# Patient Record
Sex: MALE | Race: WHITE | Employment: FULL TIME | URBAN - METROPOLITAN AREA
[De-identification: names, ages, dates, MRNs, and addresses within clinical notes are randomized per-mention and may not be internally consistent; named-entity substitution may affect disease eponyms.]

---

## 2017-01-23 RX ORDER — ZOLPIDEM TARTRATE 12.5 MG/1
12.5 TABLET, FILM COATED, EXTENDED RELEASE ORAL
Qty: 30 TAB | Refills: 5 | OUTPATIENT
Start: 2017-01-23 | End: 2017-09-01 | Stop reason: SDUPTHER

## 2017-01-23 NOTE — TELEPHONE ENCOUNTER
Rx called into pharmacy on file. Requested Prescriptions     Signed Prescriptions Disp Refills    zolpidem CR (AMBIEN CR) 12.5 mg tablet 30 Tab 5     Sig: Take 1 Tab by mouth nightly as needed for Sleep. Max Daily Amount: 12.5 mg.      Authorizing Provider: Dinorah Amato

## 2017-01-23 NOTE — TELEPHONE ENCOUNTER
Requested Prescriptions     Pending Prescriptions Disp Refills    zolpidem CR (AMBIEN CR) 12.5 mg tablet 30 Tab 5     Sig: Take 1 Tab by mouth nightly as needed for Sleep. Max Daily Amount: 12.5 mg.      Last visit 8/17/2016  Next visit 02/17/2017

## 2017-02-17 ENCOUNTER — OFFICE VISIT (OUTPATIENT)
Dept: INTERNAL MEDICINE CLINIC | Age: 57
End: 2017-02-17

## 2017-02-17 VITALS
DIASTOLIC BLOOD PRESSURE: 78 MMHG | BODY MASS INDEX: 32.53 KG/M2 | OXYGEN SATURATION: 98 % | TEMPERATURE: 97.7 F | HEART RATE: 70 BPM | WEIGHT: 219.6 LBS | RESPIRATION RATE: 20 BRPM | HEIGHT: 69 IN | SYSTOLIC BLOOD PRESSURE: 138 MMHG

## 2017-02-17 DIAGNOSIS — E78.2 MIXED HYPERLIPIDEMIA: Primary | ICD-10-CM

## 2017-02-17 DIAGNOSIS — I25.10 CORONARY ARTERY DISEASE DUE TO CALCIFIED CORONARY LESION: ICD-10-CM

## 2017-02-17 DIAGNOSIS — I25.84 CORONARY ARTERY DISEASE DUE TO CALCIFIED CORONARY LESION: ICD-10-CM

## 2017-02-17 DIAGNOSIS — Z79.899 CONTROLLED SUBSTANCE AGREEMENT SIGNED: ICD-10-CM

## 2017-02-17 DIAGNOSIS — I10 ESSENTIAL HYPERTENSION, BENIGN: ICD-10-CM

## 2017-02-17 DIAGNOSIS — R73.03 PREDIABETES: ICD-10-CM

## 2017-02-17 NOTE — MR AVS SNAPSHOT
Visit Information Date & Time Provider Department Dept. Phone Encounter #  
 2/17/2017 10:40 AM RONA Munoz [facility] [phone] [id number] Follow-up Instructions Return in about 4 months (around 6/17/2017) for re-establish care with Dr. Eda Menendez. Upcoming Health Maintenance Date Due INFLUENZA AGE 9 TO ADULT 7/1/2017* COLONOSCOPY 11/10/2019 DTaP/Tdap/Td series (2 - Td) 6/8/2025 *Topic was postponed. The date shown is not the original due date. Allergies as of 2/17/2017  Review Complete On: 2/17/2017 By: Faby Hughes LPN Severity Noted Reaction Type Reactions Bee Sting [Sting, Bee] High 01/15/2010    Anaphylaxis Amlodipine  01/31/2011    Other (comments) Increased GERD Demerol [Meperidine]  01/15/2010   Intolerance Unknown (comments) Doubles over in pain Lisinopril  01/31/2011    Other (comments) Fatigue Current Immunizations  Reviewed on 6/8/2015 Name Date Influenza Vaccine 9/1/2005 TD Vaccine 1/15/2006 Tdap 6/8/2015 Not reviewed this visit You Were Diagnosed With   
  
 Codes Comments Mixed hyperlipidemia    -  Primary ICD-10-CM: O02.2 ICD-9-CM: 272.2 Essential hypertension, benign     ICD-10-CM: I10 
ICD-9-CM: 401.1 Prediabetes     ICD-10-CM: R73.03 
ICD-9-CM: 790.29 Coronary artery disease due to calcified coronary lesion     ICD-10-CM: I25.10, I25.84 ICD-9-CM: 414.00, 414.4 BMI 32.0-32.9,adult     ICD-10-CM: G50.12 
ICD-9-CM: V85.32 Controlled substance agreement signed     ICD-10-CM: Z79.899 ICD-9-CM: V58.69 Vitals BP Pulse Temp Resp Height(growth percentile) Weight(growth percentile) 138/78 70 97.7 °F (36.5 °C) (Oral) 20 5' 9\" (1.753 m) 219 lb 9.6 oz (99.6 kg) SpO2 BMI Smoking Status 98% 32.43 kg/m2 Current Some Day Smoker Vitals History BMI and BSA Data Body Mass Index Body Surface Area 32.43 kg/m 2 2.2 m 2 Preferred Pharmacy Pharmacy Name Phone [id number] [phone] Your Updated Medication List  
  
   
This list is accurate as of: 2/17/17 11:27 AM.  Always use your most recent med list.  
  
  
  
  
 aspirin 81 mg chewable tablet Take 1 tablet by mouth daily. atorvastatin 20 mg tablet Commonly known as:  LIPITOR Take 1 Tab by mouth daily. EPINEPHrine 0.3 mg/0.3 mL injection Commonly known as:  EPIPEN  
0.3 mL by IntraMUSCular route once as needed. fexofenadine 180 mg tablet Commonly known as:  Wandalee Wolf Take 1 Tab by mouth daily. fluticasone 50 mcg/actuation nasal spray Commonly known as:  Saw Cyphers 2 Sprays by Both Nostrils route daily. hydroCHLOROthiazide 25 mg tablet Commonly known as:  HYDRODIURIL  
TAKE 1 TABLET BY MOUTH DAILY. NIACIN FLUSH FREE 400 mg niacin (500 mg) Cap Generic drug:  niacin-inositol Take 1 Cap by mouth daily. Omega-3-DHA-EPA-Fish Oil 1,000 mg (120 mg-180 mg) Cap Take 1 Tab by mouth daily. omeprazole 40 mg capsule Commonly known as:  PRILOSEC  
TAKE 1 CAPSULE BY MOUTH EVERY DAY  
  
 selenium 200 mcg Tab Take 400 mcg by mouth daily. VITAMIN B-6 100 mg tablet Generic drug:  pyridoxine (vitamin B6) Take 100 mg by mouth every other day. VITAMIN C 500 mg tablet Generic drug:  ascorbic acid (vitamin C) Take  by mouth.  
  
 zolpidem CR 12.5 mg tablet Commonly known as:  AMBIEN CR Take 1 Tab by mouth nightly as needed for Sleep. Max Daily Amount: 12.5 mg. We Performed the Following HEMOGLOBIN A1C WITH EAG [39971 CPT(R)] LIPID PANEL [16233 CPT(R)] METABOLIC PANEL, COMPREHENSIVE [64969 CPT(R)] Follow-up Instructions Return in about 4 months (around 6/17/2017) for re-establish care with Dr. Eda Menendez. Patient Instructions Dr. Ata Corbett [facility] [phone] Introducing [facility]! Duran Shelby introduces [employer] patient portal. Now you can access parts of your medical record, email your doctor's office, and request medication refills online. 1. In your internet browser, go to [email or web address] 2. Click on the First Time User? Click Here link in the Sign In box. You will see the New Member Sign Up page. 3. Enter your [employer] Access Code exactly as it appears below. You will not need to use this code after youve completed the sign-up process. If you do not sign up before the expiration date, you must request a new code. · [employer] Access Code: [email or web address] Expires: 5/18/2017 11:21 AM 
 
4. Enter the last four digits of your Social Security Number (xxxx) and Date of Birth (mm/dd/yyyy) as indicated and click Submit. You will be taken to the next sign-up page. 5. Create a [employer] ID. This will be your [employer] login ID and cannot be changed, so think of one that is secure and easy to remember. 6. Create a [employer] password. You can change your password at any time. 7. Enter your Password Reset Question and Answer. This can be used at a later time if you forget your password. 8. Enter your e-mail address. You will receive e-mail notification when new information is available in [address] 9. Click Sign Up. You can now view and download portions of your medical record. 10. Click the Download Summary menu link to download a portable copy of your medical information. If you have questions, please visit the Frequently Asked Questions section of the [employer] website. Remember, [employer] is NOT to be used for urgent needs. For medical emergencies, dial 911. Now available from your iPhone and Android! Please provide this summary of care documentation to your next provider. Your primary care clinician is listed as Elena Noe. If you have any questions after today's visit, please call [phone].

## 2017-02-17 NOTE — PROGRESS NOTES
Subjective:      Marilou Vega is a 49 y.o. male who presents today for 6 mo HTN, HLD follow up. Exercise: yoga once a week, cycling 1-2x/week, swimming 1-2x/week. Just started lifting again. S/p L rotator cuff repair 9/2016 (Dr. Funmilayo Lopez). In general, he reports following a heart healthy diet. BP at home has been running consistently around 128-132/80-84. Reports adherence with all medications. Takes Ambien sparingly. Denies HA, dizziness, chest pain, SOB, peripheral edema. Current Outpatient Prescriptions   Medication Sig Dispense Refill    zolpidem CR (AMBIEN CR) 12.5 mg tablet Take 1 Tab by mouth nightly as needed for Sleep. Max Daily Amount: 12.5 mg. 30 Tab 5    hydroCHLOROthiazide (HYDRODIURIL) 25 mg tablet TAKE 1 TABLET BY MOUTH DAILY. 30 Tab 6    omeprazole (PRILOSEC) 40 mg capsule TAKE 1 CAPSULE BY MOUTH EVERY DAY 30 Cap 3    atorvastatin (LIPITOR) 20 mg tablet Take 1 Tab by mouth daily. 30 Tab 6    fluticasone (FLONASE) 50 mcg/actuation nasal spray 2 Sprays by Both Nostrils route daily. 1 Bottle 0    fexofenadine (ALLEGRA) 180 mg tablet Take 1 Tab by mouth daily.  EPINEPHrine (EPIPEN) 0.3 mg/0.3 mL injection 0.3 mL by IntraMUSCular route once as needed. 2 Syringe 1    niacin-inositol (NIACIN FLUSH FREE) 400 mg niacin (500 mg) cap Take 1 Cap by mouth daily.  aspirin 81 mg chewable tablet Take 1 tablet by mouth daily.  Omega-3-DHA-EPA-Fish Oil 1,000 (120-180) mg cap Take 1 Tab by mouth daily.  selenium 200 mcg Tab Take 400 mcg by mouth daily.  ascorbic acid (VITAMIN C) 500 mg tablet Take  by mouth.  pyridoxine (VITAMIN B-6) 100 mg tablet Take 100 mg by mouth every other day.        Allergies   Allergen Reactions    Bee Sting [Sting, Bee] Anaphylaxis    Amlodipine Other (comments)     Increased GERD    Demerol [Meperidine] Unknown (comments)     Doubles over in pain    Lisinopril Other (comments)     Fatigue       Past Medical History   Diagnosis Date  CAD (coronary artery disease) 2016     mild; calcium scoring score=46    GERD (gastroesophageal reflux disease)     Hypertension     Impaired fasting glucose     Mixed hyperlipidemia      LDL goal <70    Prediabetes 09/2016    Rectal polyp 11/10/14    Rotator cuff tear, left      Dr. Faisal Emmanuel Sleep disturbance         Review of Systems    Pertinent items are noted in HPI. Objective:     Visit Vitals    /78    Pulse 70    Temp 97.7 °F (36.5 °C) (Oral)    Resp 20    Ht 5' 9\" (1.753 m)    Wt 219 lb 9.6 oz (99.6 kg)    SpO2 98%    BMI 32.43 kg/m2     General appearance: alert, cooperative, no distress, appears stated age, very pleasant white overweight appearing male  Neck: supple, symmetrical, trachea midline, no adenopathy and no carotid bruit  Lungs: clear to auscultation bilaterally  Heart: regular rate and rhythm, S1, S2 normal, no murmur, click, rub or gallop  Abdomen: soft, non-tender. Bowel sounds normal. No masses,  no organomegaly  Extremities: no edema    Assessment/Plan:   Mixed hyperlipidemia - continue regular exercise. Mediterranean diet. Wt reduction. Work physical form to be completed when lab results return. -     METABOLIC PANEL, COMPREHENSIVE  -     LIPID PANEL    Essential hypertension, benign - systolic BP a little elevated today but he reports normal readings at home. Continue home BP monitoring.         -     METABOLIC PANEL, COMPREHENSIVE    Prediabetes  -     METABOLIC PANEL, COMPREHENSIVE  -     HEMOGLOBIN A1C WITH EAG    Coronary artery disease due to calcified coronary lesion - very mild. Follow up with Dr. Kary De Luna. BMI 32.0-32.9,adult    Controlled substance agreement signed    Follow-up Disposition:  Return in about 4 months (around 6/17/2017) for re-establish care with Dr. Dayne Bae.    Advised him to call back or return to office if symptoms worsen/change/persist.  Discussed expected course/resolution/complications of diagnosis in detail with patient. Medication risks/benefits/costs/interactions/alternatives discussed with patient. He was given an after visit summary which includes diagnoses, current medications, & vitals. He expressed understanding with the diagnosis and plan.     Luanne Barrow PA-C

## 2017-03-07 ENCOUNTER — TELEPHONE (OUTPATIENT)
Dept: INTERNAL MEDICINE CLINIC | Age: 57
End: 2017-03-07

## 2017-03-07 LAB
ALBUMIN SERPL-MCNC: 4.4 G/DL (ref 3.5–5.5)
ALBUMIN/GLOB SERPL: 2.2 {RATIO} (ref 1.1–2.5)
ALP SERPL-CCNC: 61 IU/L (ref 39–117)
ALT SERPL-CCNC: 32 IU/L (ref 0–44)
AST SERPL-CCNC: 24 IU/L (ref 0–40)
BILIRUB SERPL-MCNC: 0.2 MG/DL (ref 0–1.2)
BUN SERPL-MCNC: 15 MG/DL (ref 6–24)
BUN/CREAT SERPL: 17 (ref 9–20)
CALCIUM SERPL-MCNC: 8.7 MG/DL (ref 8.7–10.2)
CHLORIDE SERPL-SCNC: 101 MMOL/L (ref 96–106)
CHOLEST SERPL-MCNC: 204 MG/DL (ref 100–199)
CO2 SERPL-SCNC: 25 MMOL/L (ref 18–29)
CREAT SERPL-MCNC: 0.89 MG/DL (ref 0.76–1.27)
EST. AVERAGE GLUCOSE BLD GHB EST-MCNC: 111 MG/DL
GLOBULIN SER CALC-MCNC: 2 G/DL (ref 1.5–4.5)
GLUCOSE SERPL-MCNC: 108 MG/DL (ref 65–99)
HBA1C MFR BLD: 5.5 % (ref 4.8–5.6)
HDLC SERPL-MCNC: 33 MG/DL
INTERPRETATION, 910389: NORMAL
LDLC SERPL CALC-MCNC: ABNORMAL MG/DL (ref 0–99)
PDF IMAGE, 910387: NORMAL
POTASSIUM SERPL-SCNC: 4.3 MMOL/L (ref 3.5–5.2)
PROT SERPL-MCNC: 6.4 G/DL (ref 6–8.5)
SODIUM SERPL-SCNC: 141 MMOL/L (ref 134–144)
TRIGL SERPL-MCNC: 558 MG/DL (ref 0–149)
VLDLC SERPL CALC-MCNC: ABNORMAL MG/DL (ref 5–40)

## 2017-03-07 NOTE — TELEPHONE ENCOUNTER
LMM to discuss results. Requested return call. I noted on msg that I am not in the office today. If he calls back, I will return his call tomorrow.

## 2017-03-08 RX ORDER — GLUCOSAM/CHONDRO/HERB 149/HYAL 750-100 MG
2 TABLET ORAL 2 TIMES DAILY
Qty: 1 CAP | Refills: 0 | Status: SHIPPED | OUTPATIENT
Start: 2017-03-08 | End: 2022-04-20

## 2017-03-08 RX ORDER — GLUCOSAM/CHONDRO/HERB 149/HYAL 750-100 MG
2 TABLET ORAL 2 TIMES DAILY
Qty: 1 CAP | Refills: 0 | Status: SHIPPED | OUTPATIENT
Start: 2017-03-08 | End: 2017-03-08 | Stop reason: SDUPTHER

## 2017-03-09 NOTE — TELEPHONE ENCOUNTER
Discussed lab results with pt:  -prediabetes resolved. A1C wnl.  -Trigs very high at 558. He notes having 2-3 beers the night before his labs. He drinks 2-3 beers about 2-3 days a week (on the weekends only). I confirmed that he is taking all medications as directed, including Lipitor, Niacin and fish oil. Plan: low fat diet. Continue regular exercise. Wt reduction. Continue current dose of Niacin and Lipitor. Increase fish oil to 2000 mg BID. Limit ETOH use to more than 2 drinks on the weekends. he expressed his understanding. All questions answered to his satisfaction. Discussed plan with Dr. Marcus Parson screening form completed and faxed. Copy mailed to pt. Results for orders placed or performed in visit on 01/43/01   METABOLIC PANEL, COMPREHENSIVE   Result Value Ref Range    Glucose 108 (H) 65 - 99 mg/dL    BUN 15 6 - 24 mg/dL    Creatinine 0.89 0.76 - 1.27 mg/dL    GFR est non-AA 97 >59 mL/min/1.73    GFR est  >59 mL/min/1.73    BUN/Creatinine ratio 17 9 - 20    Sodium 141 134 - 144 mmol/L    Potassium 4.3 3.5 - 5.2 mmol/L    Chloride 101 96 - 106 mmol/L    CO2 25 18 - 29 mmol/L    Calcium 8.7 8.7 - 10.2 mg/dL    Protein, total 6.4 6.0 - 8.5 g/dL    Albumin 4.4 3.5 - 5.5 g/dL    GLOBULIN, TOTAL 2.0 1.5 - 4.5 g/dL    A-G Ratio 2.2 1.1 - 2.5    Bilirubin, total 0.2 0.0 - 1.2 mg/dL    Alk.  phosphatase 61 39 - 117 IU/L    AST (SGOT) 24 0 - 40 IU/L    ALT (SGPT) 32 0 - 44 IU/L   HEMOGLOBIN A1C WITH EAG   Result Value Ref Range    Hemoglobin A1c 5.5 4.8 - 5.6 %    Estimated average glucose 111 mg/dL   LIPID PANEL   Result Value Ref Range    Cholesterol, total 204 (H) 100 - 199 mg/dL    Triglyceride 558 (HH) 0 - 149 mg/dL    HDL Cholesterol 33 (L) >39 mg/dL    VLDL, calculated Comment 5 - 40 mg/dL    LDL, calculated Comment 0 - 99 mg/dL   CVD REPORT   Result Value Ref Range    INTERPRETATION Note     PDF IMAGE Not applicable

## 2017-05-05 ENCOUNTER — OFFICE VISIT (OUTPATIENT)
Dept: INTERNAL MEDICINE CLINIC | Age: 57
End: 2017-05-05

## 2017-05-05 VITALS
OXYGEN SATURATION: 97 % | TEMPERATURE: 97.4 F | WEIGHT: 213.6 LBS | DIASTOLIC BLOOD PRESSURE: 92 MMHG | BODY MASS INDEX: 31.64 KG/M2 | SYSTOLIC BLOOD PRESSURE: 154 MMHG | HEIGHT: 69 IN | HEART RATE: 76 BPM | RESPIRATION RATE: 16 BRPM

## 2017-05-05 DIAGNOSIS — I10 ESSENTIAL HYPERTENSION, BENIGN: ICD-10-CM

## 2017-05-05 DIAGNOSIS — S39.012A LOW BACK STRAIN, INITIAL ENCOUNTER: ICD-10-CM

## 2017-05-05 DIAGNOSIS — L60.0 INGROWN TOENAIL: ICD-10-CM

## 2017-05-05 DIAGNOSIS — L03.031 PARONYCHIA OF GREAT TOE OF RIGHT FOOT: Primary | ICD-10-CM

## 2017-05-05 RX ORDER — NAPROXEN 500 MG/1
500 TABLET ORAL 2 TIMES DAILY WITH MEALS
Qty: 20 TAB | Refills: 0 | Status: SHIPPED | OUTPATIENT
Start: 2017-05-05 | End: 2017-05-15

## 2017-05-05 RX ORDER — CEPHALEXIN 250 MG/1
250 CAPSULE ORAL 4 TIMES DAILY
Qty: 20 CAP | Refills: 0 | Status: SHIPPED | OUTPATIENT
Start: 2017-05-05 | End: 2017-05-10

## 2017-05-05 NOTE — PROGRESS NOTES
Chief Complaint   Patient presents with    Ingrown Toenail     Right great toe. pt states he had an ingrown toenail back in December that he attempted to cut out. Has not seemed to become infected or show drainage but notes that it is very painful after a day of wearing shoes while working.

## 2017-05-05 NOTE — MR AVS SNAPSHOT
Visit Information Date & Time Provider Department Dept. Phone Encounter #  
 5/5/2017 11:00 AM O'Fallon Males, NP [facility] [phone] [id number] Your Appointments 6/12/2017 10:45 AM  
New Patient with Naida Alvarado MD  
[facility]) Appt Note: Np est PCP; confirmed; R/S np est pcp [address] [facility] [address] [place] [address]  
[phone]  
  
   
 [address] [place] [address] [address] Upcoming Health Maintenance Date Due INFLUENZA AGE 9 TO ADULT 8/1/2017 COLONOSCOPY 11/10/2019 DTaP/Tdap/Td series (2 - Td) 6/8/2025 Allergies as of 5/5/2017  Review Complete On: 5/5/2017 By: Denise Males, NP Severity Noted Reaction Type Reactions Bee Sting [Sting, Bee] High 01/15/2010    Anaphylaxis Amlodipine  01/31/2011    Other (comments) Increased GERD Demerol [Meperidine]  01/15/2010   Intolerance Unknown (comments) Doubles over in pain Lisinopril  01/31/2011    Other (comments) Fatigue Current Immunizations  Reviewed on 6/8/2015 Name Date Influenza Vaccine 9/1/2005 TD Vaccine 1/15/2006 Tdap 6/8/2015 Not reviewed this visit You Were Diagnosed With   
  
 Codes Comments Paronychia of great toe of right foot    -  Primary ICD-10-CM: L03.031 
ICD-9-CM: 681.11 Low back strain, initial encounter     ICD-10-CM: S39.012A ICD-9-CM: 847.2 Essential hypertension, benign     ICD-10-CM: I10 
ICD-9-CM: 401.1 Ingrown toenail     ICD-10-CM: L60.0 ICD-9-CM: 703.0 Vitals BP Pulse Temp Resp Height(growth percentile) Weight(growth percentile) (!) 154/92 76 97.4 °F (36.3 °C) (Oral) 16 5' 9\" (1.753 m) 213 lb 9.6 oz (96.9 kg) SpO2 BMI Smoking Status 97% 31.54 kg/m2 Current Some Day Smoker Vitals History BMI and BSA Data Body Mass Index Body Surface Area 31.54 kg/m 2 2.17 m 2 Preferred Pharmacy Pharmacy Name Phone [id number] [address] [address] [address] [phone] Your Updated Medication List  
  
   
This list is accurate as of: 5/5/17 11:47 AM.  Always use your most recent med list.  
  
  
  
  
 aspirin 81 mg chewable tablet Take 1 tablet by mouth daily. atorvastatin 20 mg tablet Commonly known as:  LIPITOR Take 1 Tab by mouth daily. cephALEXin 250 mg capsule Commonly known as:  [place] Spry Take 1 Cap by mouth four (4) times daily for 5 days. EPINEPHrine 0.3 mg/0.3 mL injection Commonly known as:  EPIPEN  
0.3 mL by IntraMUSCular route once as needed. fluticasone 50 mcg/actuation nasal spray Commonly known as:  Skipper Or 2 Sprays by Both Nostrils route daily. hydroCHLOROthiazide 25 mg tablet Commonly known as:  HYDRODIURIL  
TAKE 1 TABLET BY MOUTH DAILY. naproxen 500 mg tablet Commonly known as:  NAPROSYN Take 1 Tab by mouth two (2) times daily (with meals) for 10 days. NIACIN FLUSH FREE 400 mg niacin (500 mg) Cap Generic drug:  niacin-inositol Take 1 Cap by mouth daily. Omega-3-DHA-EPA-Fish Oil 1,000 mg (120 mg-180 mg) Cap Take 2 Tabs by mouth two (2) times a day. omeprazole 40 mg capsule Commonly known as:  PRILOSEC  
TAKE 1 CAPSULE BY MOUTH EVERY DAY  
  
 selenium 200 mcg Tab Take 400 mcg by mouth daily. VITAMIN B-6 100 mg tablet Generic drug:  pyridoxine (vitamin B6) Take 100 mg by mouth every other day. VITAMIN C 500 mg tablet Generic drug:  ascorbic acid (vitamin C) Take  by mouth.  
  
 zolpidem CR 12.5 mg tablet Commonly known as:  AMBIEN CR Take 1 Tab by mouth nightly as needed for Sleep. Max Daily Amount: 12.5 mg.  
  
  
  
  
Prescriptions Sent to Pharmacy Refills  
 naproxen (NAPROSYN) 500 mg tablet 0 Sig: Take 1 Tab by mouth two (2) times daily (with meals) for 10 days.   
 Class: Normal  
 Pharmacy: [facility]/pharmacy #[id number] - [address] Ph #: [phone] Route: Oral  
 cephALEXin (KEFLEX) 250 mg capsule 0 Sig: Take 1 Cap by mouth four (4) times daily for 5 days. Class: Normal  
 Pharmacy: [address]  #: [phone] Route: Oral  
  
We Performed the Following REFERRAL TO PODIATRY [REF90 Custom] Comments:  
 Please evaluate patient for recurrent ingrown toenails. Referral Information Referral ID Referred By Referred To  
  
 [id number]   
   [address] [place], [address] Visits Status Start Date End Date 1 New Request 5/5/17 5/5/18 If your referral has a status of pending review or denied, additional information will be sent to support the outcome of this decision. Patient Instructions Low Back Pain: Exercises Your Care Instructions Here are some examples of typical rehabilitation exercises for your condition. Start each exercise slowly. Ease off the exercise if you start to have pain. Your doctor or physical therapist will tell you when you can start these exercises and which ones will work best for you. How to do the exercises Press-up 1. Lie on your stomach, supporting your body with your forearms. 2. Press your elbows down into the floor to raise your upper back. As you do this, relax your stomach muscles and allow your back to arch without using your back muscles. As your press up, do not let your hips or pelvis come off the floor. 3. Hold for 15 to 30 seconds, then relax. 4. Repeat 2 to 4 times. Alternate arm and leg (bird dog) exercise Note: Do this exercise slowly. Try to keep your body straight at all times, and do not let one hip drop lower than the other. 1. Start on the floor, on your hands and knees. 2. Tighten your belly muscles. 3. Raise one leg off the floor, and hold it straight out behind you. Be careful not to let your hip drop down, because that will twist your trunk. 4. Hold for about 6 seconds, then lower your leg and switch to the other leg. 5. Repeat 8 to 12 times on each leg. 6. Over time, work up to holding for 10 to 30 seconds each time. 7. If you feel stable and secure with your leg raised, try raising the opposite arm straight out in front of you at the same time. Knee-to-chest exercise 1. Lie on your back with your knees bent and your feet flat on the floor. 2. Bring one knee to your chest, keeping the other foot flat on the floor (or keeping the other leg straight, whichever feels better on your lower back). 3. Keep your lower back pressed to the floor. Hold for at least 15 to 30 seconds. 4. Relax, and lower the knee to the starting position. 5. Repeat with the other leg. Repeat 2 to 4 times with each leg. 6. To get more stretch, put your other leg flat on the floor while pulling your knee to your chest. 
Curl-ups 1. Lie on the floor on your back with your knees bent at a 90-degree angle. Your feet should be flat on the floor, about 12 inches from your buttocks. 2. Cross your arms over your chest. If this bothers your neck, try putting your hands behind your neck (not your head), with your elbows spread apart. 3. Slowly tighten your belly muscles and raise your shoulder blades off the floor. 4. Keep your head in line with your body, and do not press your chin to your chest. 
5. Hold this position for 1 or 2 seconds, then slowly lower yourself back down to the floor. 6. Repeat 8 to 12 times. Pelvic tilt exercise 1. Lie on your back with your knees bent. 2. \"Brace\" your stomach. This means to tighten your muscles by pulling in and imagining your belly button moving toward your spine.  You should feel like your back is pressing to the floor and your hips and pelvis are rocking back. 3. Hold for about 6 seconds while you breathe smoothly. 4. Repeat 8 to 12 times. Heel dig bridging 1. Lie on your back with both knees bent and your ankles bent so that only your heels are digging into the floor. Your knees should be bent about 90 degrees. 2. Then push your heels into the floor, squeeze your buttocks, and lift your hips off the floor until your shoulders, hips, and knees are all in a straight line. 3. Hold for about 6 seconds as you continue to breathe normally, and then slowly lower your hips back down to the floor and rest for up to 10 seconds. 4. Do 8 to 12 repetitions. Hamstring stretch in doorway 1. Lie on your back in a doorway, with one leg through the open door. 2. Slide your leg up the wall to straighten your knee. You should feel a gentle stretch down the back of your leg. 3. Hold the stretch for at least 15 to 30 seconds. Do not arch your back, point your toes, or bend either knee. Keep one heel touching the floor and the other heel touching the wall. 4. Repeat with your other leg. 5. Do 2 to 4 times for each leg. Hip flexor stretch 1. Kneel on the floor with one knee bent and one leg behind you. Place your forward knee over your foot. Keep your other knee touching the floor. 2. Slowly push your hips forward until you feel a stretch in the upper thigh of your rear leg. 3. Hold the stretch for at least 15 to 30 seconds. Repeat with your other leg. 4. Do 2 to 4 times on each side. Wall sit 1. Stand with your back 10 to 12 inches away from a wall. 2. Lean into the wall until your back is flat against it. 3. Slowly slide down until your knees are slightly bent, pressing your lower back into the wall. 4. Hold for about 6 seconds, then slide back up the wall. 5. Repeat 8 to 12 times. Follow-up care is a key part of your treatment and safety.  Be sure to make and go to all appointments, and call your doctor if you are having problems. It's also a good idea to know your test results and keep a list of the medicines you take. Where can you learn more? Go to http://chalo-tahir.info/. Enter V151 in the search box to learn more about \"Low Back Pain: Exercises. \" Current as of: May 23, 2016 Content Version: 11.2 © 8168-2471 [employer]. Care instructions adapted under license by [employer] (which disclaims liability or warranty for this information). If you have questions about a medical condition or this instruction, always ask your healthcare professional. [address] any warranty or liability for your use of this information. Introducing [facility] & HEALTH SERVICES! [facility] [facility] introduces [employer] patient portal. Now you can access parts of your medical record, email your doctor's office, and request medication refills online. 1. In your internet browser, go to [email or web address] 2. Click on the First Time User? Click Here link in the Sign In box. You will see the New Member Sign Up page. 3. Enter your Car[employer] IQ Access Code exactly as it appears below. You will not need to use this code after youve completed the sign-up process. If you do not sign up before the expiration date, you must request a new code. · [employer] Access Code: [email or web address] Expires: 5/18/2017 12:21 PM 
 
4. Enter the last four digits of your Social Security Number (xxxx) and Date of Birth (mm/dd/yyyy) as indicated and click Submit. You will be taken to the next sign-up page. 5. Create a Trulioot ID. This will be your Carrier IQ login ID and cannot be changed, so think of one that is secure and easy to remember. 6. Create a Car[employer] IQ password. You can change your password at any time. 7. Enter your Password Reset Question and Answer. This can be used at a later time if you forget your password. 8. Enter your e-mail address.  You will receive e-mail notification when new information is available in Cardiosonic. 9. Click Sign Up. You can now view and download portions of your medical record. 10. Click the Download Summary menu link to download a portable copy of your medical information. If you have questions, please visit the Frequently Asked Questions section of the Cardiosonic website. Remember, Cardiosonic is NOT to be used for urgent needs. For medical emergencies, dial 911. Now available from your iPhone and Android! Please provide this summary of care documentation to your next provider. Your primary care clinician is listed as Jaleel Mcnamara. If you have any questions after today's visit, please call [phone].

## 2017-05-05 NOTE — PATIENT INSTRUCTIONS

## 2017-05-05 NOTE — PROGRESS NOTES
48 yo male with R great toe issue. He has had recurrent ingrown toenails - last tx was Dec.  It remains tender, and wearing regular shoes is painful. He has soaked in Epsom Salts and used Neosporin. About 3 weeks ago, he had recurrent sciatica. He has gone for messages. 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 He has taken some Advil here and there. He has been doing his yoga back exercises. PE: WNWD WF   T - 97.4   Repeat BP - 154/92   R Great Toe - red around entire nail; no drainage   Low Back - palpable tightness and tenderness across Lumbo-sacral area   SLR is NEG   DTRs absent in knees and ankles    Imp: Paronychia R Great Toe   Low Back Strain   HTN     Plan: Naproxen 500 mg BID for 10 days   Heat / Ice   Low Back exercises   Cephalexin for 5 days   Refer to Podiatry   He will see Dr. Srinivasa Guevara June 12 to establish for Primary Care  ________________________  Expected course of current diagnosed problem(s) as well as expected progression and possible complications, and desired follow up with provider are discussed with patient. Patient is encouraged to be back in touch with any questions or concerns. Patient expresses understanding of plan of care. Patient is given AVS which includes diagnoses, current medications, vitals.

## 2017-06-12 ENCOUNTER — OFFICE VISIT (OUTPATIENT)
Dept: INTERNAL MEDICINE CLINIC | Age: 57
End: 2017-06-12

## 2017-06-12 VITALS
OXYGEN SATURATION: 95 % | DIASTOLIC BLOOD PRESSURE: 92 MMHG | HEART RATE: 66 BPM | HEIGHT: 69 IN | BODY MASS INDEX: 31.7 KG/M2 | SYSTOLIC BLOOD PRESSURE: 142 MMHG | TEMPERATURE: 98.4 F | RESPIRATION RATE: 20 BRPM | WEIGHT: 214 LBS

## 2017-06-12 DIAGNOSIS — E78.2 MIXED HYPERLIPIDEMIA: ICD-10-CM

## 2017-06-12 DIAGNOSIS — K21.9 GASTROESOPHAGEAL REFLUX DISEASE WITHOUT ESOPHAGITIS: ICD-10-CM

## 2017-06-12 DIAGNOSIS — R73.03 PREDIABETES: ICD-10-CM

## 2017-06-12 DIAGNOSIS — I10 ESSENTIAL HYPERTENSION, BENIGN: Primary | ICD-10-CM

## 2017-06-12 RX ORDER — OMEPRAZOLE 40 MG/1
40 CAPSULE, DELAYED RELEASE ORAL
Qty: 30 CAP | Refills: 3 | Status: SHIPPED | OUTPATIENT
Start: 2017-06-12 | End: 2018-10-13 | Stop reason: SDUPTHER

## 2017-06-12 NOTE — LETTER
7/24/2017 8:21 AM 
 
Mr. Jennifer Sebastian. Narc[facility] [id number] Dear Jennifer Sebastian.: 
 
Please find your most recent results below. Resulted Orders METABOLIC PANEL, COMPREHENSIVE Result Value Ref Range Glucose 110 (H) 65 - 99 mg/dL Comment:  
   Specimen received in contact with cells. No visible hemolysis 
present. However GLUC may be decreased and K increased. Clinical 
correlation indicated. BUN 13 6 - 24 mg/dL Creatinine 0.87 0.76 - 1.27 mg/dL GFR est non-AA 98 >59 mL/min/1.73 GFR est  >59 mL/min/1.73  
 BUN/Creatinine ratio 15 9 - 20 Sodium 143 134 - 144 mmol/L Potassium 4.1 3.5 - 5.2 mmol/L Chloride 101 96 - 106 mmol/L  
 CO2 23 18 - 29 mmol/L Calcium 9.0 8.7 - 10.2 mg/dL Protein, total 6.6 6.0 - 8.5 g/dL Albumin 4.4 3.5 - 5.5 g/dL GLOBULIN, TOTAL 2.2 1.5 - 4.5 g/dL A-G Ratio 2.0 1.2 - 2.2 Bilirubin, total 0.6 0.0 - 1.2 mg/dL Alk. phosphatase 57 39 - 117 IU/L  
 AST (SGOT) 47 (H) 0 - 40 IU/L  
 ALT (SGPT) 50 (H) 0 - 44 IU/L Narrative Performed at:  [address]  [id number] : Irina Pineda MD, Phone:  [phone] HEMOGLOBIN A1C WITH EAG Result Value Ref Range Hemoglobin A1c 5.3 4.8 - 5.6 % Comment:  
            Pre-diabetes: 5.7 - 6.4 Diabetes: >6.4 Glycemic control for adults with diabetes: <7.0 Estimated average glucose 105 mg/dL Narrative Performed at:  [address]  [id number] : Irina Pineda MD, Phone:  [phone] LIPID PANEL Result Value Ref Range Cholesterol, total 146 100 - 199 mg/dL Triglyceride 206 (H) 0 - 149 mg/dL HDL Cholesterol 39 (L) >39 mg/dL VLDL, calculated 41 (H) 5 - 40 mg/dL LDL, calculated 66 0 - 99 mg/dL Narrative Performed at:  [place] [address] [address]  [id number] : Urszula Anne MD, Phone:  [phone] CVD REPORT Result Value Ref Range INTERPRETATION Note Comment:  
   Supplement report is available. Narrative Performed at:  [address] [place] [address]  [id number] : Jasiel Mustafa PhD, Phone:  [phone] RECOMMENDATIONS: 
 
Triglycerides and lipids much better but little elevated LFT probably due to niacin ans statin. will repeat Liver Function Tests  in 1month. Avoid alcohol and too much tylenol Please call me if you have any questions: [phone] Sincerely, Sonal Choe MD

## 2017-06-12 NOTE — PROGRESS NOTES
There are no preventive care reminders to display for this patient. Chief Complaint   Patient presents with    New Patient    Hypertension    Coronary Artery Disease    Cholesterol Problem       1. Have you been to the ER, urgent care clinic since your last visit? Hospitalized since your last visit? No    2. Have you seen or consulted any other health care providers outside of the [address] [place] [address] since your last visit? Include any pap smears or colon screening. No    3) Do you have an Advance Directive on file? no    4) Are you interested in receiving information on Advance Directives? NO      Patient is accompanied by self I have received verbal consent from Shu Tolentino to discuss any/all medical information while they are present in the room.

## 2017-06-12 NOTE — MR AVS SNAPSHOT
Visit Information Date & Time Provider Department Dept. Phone Encounter #  
 6/12/2017 10:45 AM Nhi Perez MD [facility] Internal Medicine [phone] [id number] Upcoming Health Maintenance Date Due INFLUENZA AGE 9 TO ADULT 8/1/2017 COLONOSCOPY 11/10/2019 DTaP/Tdap/Td series (2 - Td) 6/8/2025 Allergies as of 6/12/2017  Review Complete On: 6/12/2017 By: Nhi Perez MD  
  
 Severity Noted Reaction Type Reactions Bee Sting [Sting, Bee] High 01/15/2010    Anaphylaxis Amlodipine  01/31/2011    Other (comments) Increased GERD Demerol [Meperidine]  01/15/2010   Intolerance Unknown (comments) Doubles over in pain Lisinopril  01/31/2011    Other (comments) Fatigue Current Immunizations  Reviewed on 6/12/2017 Name Date Influenza Vaccine 9/1/2005 TD Vaccine 1/15/2006 Tdap 6/8/2015 Reviewed by Nhi Perez MD on 6/12/2017 at 11:45 AM  
You Were Diagnosed With   
  
 Codes Comments Essential hypertension, benign    -  Primary ICD-10-CM: I10 
ICD-9-CM: 401.1 Prediabetes     ICD-10-CM: R73.03 
ICD-9-CM: 790.29 Mixed hyperlipidemia     ICD-10-CM: E78.2 ICD-9-CM: 272.2 Gastroesophageal reflux disease without esophagitis     ICD-10-CM: K21.9 ICD-9-CM: 530.81 Vitals BP Pulse Temp Resp Height(growth percentile) Weight(growth percentile) (!) 142/92 66 98.4 °F (36.9 °C) (Oral) 20 5' 9\" (1.753 m) 214 lb (97.1 kg) SpO2 BMI Smoking Status 95% 31.6 kg/m2 Never Smoker Vitals History BMI and BSA Data Body Mass Index Body Surface Area  
 31.6 kg/m 2 2.17 m 2 Preferred Pharmacy Pharmacy Name Phone [id number] - [address] [phone] Your Updated Medication List  
  
   
This list is accurate as of: 6/12/17 11:46 AM.  Always use your most recent med list.  
  
  
  
  
 aspirin 81 mg chewable tablet Take 1 tablet by mouth daily. atorvastatin 20 mg tablet Commonly known as:  LIPITOR Take 1 Tab by mouth daily. EPINEPHrine 0.3 mg/0.3 mL injection Commonly known as:  EPIPEN  
0.3 mL by IntraMUSCular route once as needed. fluticasone 50 mcg/actuation nasal spray Commonly known as:  [place]ather[place] Fix 2 Sprays by Both Nostrils route daily. hydroCHLOROthiazide 25 mg tablet Commonly known as:  HYDRODIURIL  
TAKE 1 TABLET BY MOUTH DAILY. NIACIN FLUSH FREE 400 mg niacin (500 mg) Cap Generic drug:  niacin-inositol Take 1 Cap by mouth daily. Omega-3-DHA-EPA-Fish Oil 1,000 mg (120 mg-180 mg) Cap Take 2 Tabs by mouth two (2) times a day. omeprazole 40 mg capsule Commonly known as:  PRILOSEC Take 1 Cap by mouth daily as needed. selenium 200 mcg Tab Take 400 mcg by mouth daily. VITAMIN B-6 100 mg tablet Generic drug:  pyridoxine (vitamin B6) Take 100 mg by mouth every other day. VITAMIN C 500 mg tablet Generic drug:  ascorbic acid (vitamin C) Take  by mouth.  
  
 zolpidem CR 12.5 mg tablet Commonly known as:  AMBIEN CR Take 1 Tab by mouth nightly as needed for Sleep. Max Daily Amount: 12.5 mg.  
  
  
  
  
Prescriptions Sent to Pharmacy Refills  
 omeprazole (PRILOSEC) 40 mg capsule 3 Sig: Take 1 Cap by mouth daily as needed. Class: Normal  
 Pharmacy: [address] #: [phone] Route: Oral  
  
We Performed the Following HEMOGLOBIN A1C WITH EAG [65695 CPT(R)] LIPID PANEL [88016 CPT(R)] METABOLIC PANEL, COMPREHENSIVE [40944 CPT(R)] Introducing [facility] & HEALTH SERVICES! [facility] introduces [employer] patient portal. Now you can access parts of your medical record, email your doctor's office, and request medication refills online. 1. In your internet browser, go to [email or web address] 2. Click on the First Time User? Click Here link in the Sign In box. You will see the New Member Sign Up page. 3. Enter your XO1 Access Code exactly as it appears below. You will not need to use this code after youve completed the sign-up process. If you do not sign up before the expiration date, you must request a new code. · XO1 Access Code: [email or web address] Expires: 9/10/2017 11:46 AM 
 
4. Enter the last four digits of your Social Security Number (xxxx) and Date of Birth (mm/dd/yyyy) as indicated and click Submit. You will be taken to the next sign-up page. 5. Create a XO1 ID. This will be your XO1 login ID and cannot be changed, so think of one that is secure and easy to remember. 6. Create a XO1 password. You can change your password at any time. 7. Enter your Password Reset Question and Answer. This can be used at a later time if you forget your password. 8. Enter your e-mail address. You will receive e-mail notification when new information is available in [address] 9. Click Sign Up. You can now view and download portions of your medical record. 10. Click the Download Summary menu link to download a portable copy of your medical information. If you have questions, please visit the Frequently Asked Questions section of the XO1 website. Remember, XO1 is NOT to be used for urgent needs. For medical emergencies, dial 911. Now available from your iPhone and Android! Please provide this summary of care documentation to your next provider. Your primary care clinician is listed as Lester Hansen. If you have any questions after today's visit, please call [phone].

## 2017-06-12 NOTE — PROGRESS NOTES
HISTORY OF PRESENT ILLNESS  Jorge Huff is a 49 y.o. male here to establish care. he has HTN,on med. doing well. no chest pain or palpitation or SOB. His lipid was elevated only once last time. watching diet. Has prediabetes in past,A1C has been stable. Has gastritis,use PPI some times. need a refill. Health maintenance are up to date. HPI    Review of Systems   Constitutional: Negative. HENT: Negative. Eyes: Negative. Respiratory: Negative. Cardiovascular: Negative. Gastrointestinal: Negative. Genitourinary: Negative. Musculoskeletal: Negative. Skin: Negative. Neurological: Negative. Psychiatric/Behavioral: Negative. Physical Exam   Constitutional: He is oriented to person, place, and time. He appears well-developed and well-nourished. No distress. HENT:   Head: Normocephalic and atraumatic. Right Ear: External ear normal.   Left Ear: External ear normal.   Nose: Nose normal.   Mouth/Throat: Oropharynx is clear and moist. No oropharyngeal exudate. Neck: Normal range of motion. Neck supple. No JVD present. No thyromegaly present. Cardiovascular: Normal rate, regular rhythm, normal heart sounds and intact distal pulses. Pulmonary/Chest: Effort normal and breath sounds normal. No respiratory distress. He has no wheezes. Abdominal: Soft. Bowel sounds are normal. He exhibits no distension. There is no tenderness. Musculoskeletal: He exhibits no edema or tenderness. Neurological: He is alert and oriented to person, place, and time. He has normal reflexes. He displays normal reflexes. No cranial nerve deficit. He exhibits normal muscle tone. Coordination normal.   Psychiatric: He has a normal mood and affect. His behavior is normal.       KVNG and Omegaaaron Trevino was seen today for new patient, hypertension, coronary artery disease and cholesterol problem. Diagnoses and all orders for this visit:    Essential hypertension, benign    Stable. on HCTZ.  Will do,    -     METABOLIC PANEL, COMPREHENSIVE  -     LIPID PANEL    Prediabetes    Eating healthier- a Mediterranean style diet with 55% or less of calories from carbohydrates has been shown to be very helpful for people with pre-diabetes. Try to eat more vegetables, whole fruit, nuts, whole grains, yogurt and less refined grains. Eat less red meat. Chicken and fish would be good protein sources. Aim to eat less than 45 grams of carbohydrates per meal. [email or web address] has a nice review.     -     HEMOGLOBIN A1C WITH EAG    Mixed hyperlipidemia  TG was elevated,watching diet. Will do,  -     METABOLIC PANEL, COMPREHENSIVE  -     LIPID PANEL    Gastroesophageal reflux disease without esophagitis    Will refill,  -     omeprazole (PRILOSEC) 40 mg capsule; Take 1 Cap by mouth daily as needed. Follow-up Disposition: 4 months. Return if symptoms worsen or fail to improve. Advised him to call back or return to office if symptoms worsen/change/persist.   Discussed expected course/resolution/complications of diagnosis in detail with patient. Medication risks/benefits/costs/interactions/alternatives discussed with patient. He was given an after visit summary which includes diagnoses, current medications, & vitals. He expressed understanding with the diagnosis and plan.

## 2017-07-06 ENCOUNTER — TELEPHONE (OUTPATIENT)
Dept: INTERNAL MEDICINE CLINIC | Age: 57
End: 2017-07-06

## 2017-07-06 RX ORDER — ATORVASTATIN CALCIUM 20 MG/1
TABLET, FILM COATED ORAL
Qty: 30 TAB | Refills: 6 | Status: SHIPPED | OUTPATIENT
Start: 2017-07-06 | End: 2018-04-16 | Stop reason: SDUPTHER

## 2017-07-06 NOTE — TELEPHONE ENCOUNTER
Called patient left message to call the office about recent refill request and scheduling a appointment with new provider for future refills.

## 2017-07-16 LAB
ALBUMIN SERPL-MCNC: 4.4 G/DL (ref 3.5–5.5)
ALBUMIN/GLOB SERPL: 2 {RATIO} (ref 1.2–2.2)
ALP SERPL-CCNC: 57 IU/L (ref 39–117)
ALT SERPL-CCNC: 50 IU/L (ref 0–44)
AST SERPL-CCNC: 47 IU/L (ref 0–40)
BILIRUB SERPL-MCNC: 0.6 MG/DL (ref 0–1.2)
BUN SERPL-MCNC: 13 MG/DL (ref 6–24)
BUN/CREAT SERPL: 15 (ref 9–20)
CALCIUM SERPL-MCNC: 9 MG/DL (ref 8.7–10.2)
CHLORIDE SERPL-SCNC: 101 MMOL/L (ref 96–106)
CHOLEST SERPL-MCNC: 146 MG/DL (ref 100–199)
CO2 SERPL-SCNC: 23 MMOL/L (ref 18–29)
CREAT SERPL-MCNC: 0.87 MG/DL (ref 0.76–1.27)
EST. AVERAGE GLUCOSE BLD GHB EST-MCNC: 105 MG/DL
GLOBULIN SER CALC-MCNC: 2.2 G/DL (ref 1.5–4.5)
GLUCOSE SERPL-MCNC: 110 MG/DL (ref 65–99)
HBA1C MFR BLD: 5.3 % (ref 4.8–5.6)
HDLC SERPL-MCNC: 39 MG/DL
INTERPRETATION, 910389: NORMAL
LDLC SERPL CALC-MCNC: 66 MG/DL (ref 0–99)
POTASSIUM SERPL-SCNC: 4.1 MMOL/L (ref 3.5–5.2)
PROT SERPL-MCNC: 6.6 G/DL (ref 6–8.5)
SODIUM SERPL-SCNC: 143 MMOL/L (ref 134–144)
TRIGL SERPL-MCNC: 206 MG/DL (ref 0–149)
VLDLC SERPL CALC-MCNC: 41 MG/DL (ref 5–40)

## 2017-07-24 DIAGNOSIS — R79.89 ELEVATED LFTS: Primary | ICD-10-CM

## 2017-07-24 NOTE — PROGRESS NOTES
TG and lipid much better but little elevated LFT probably due to niacin ans statin. will repeat LFT in 1month.   Avoid alcohol and too much tylenol

## 2017-10-06 RX ORDER — HYDROCHLOROTHIAZIDE 25 MG/1
TABLET ORAL
Qty: 30 TAB | Refills: 6 | Status: SHIPPED | OUTPATIENT
Start: 2017-10-06 | End: 2018-07-02 | Stop reason: SDUPTHER

## 2017-10-12 ENCOUNTER — OFFICE VISIT (OUTPATIENT)
Dept: INTERNAL MEDICINE CLINIC | Age: 57
End: 2017-10-12

## 2017-10-12 VITALS
SYSTOLIC BLOOD PRESSURE: 140 MMHG | HEART RATE: 59 BPM | TEMPERATURE: 97.4 F | OXYGEN SATURATION: 99 % | BODY MASS INDEX: 32.44 KG/M2 | WEIGHT: 219 LBS | HEIGHT: 69 IN | RESPIRATION RATE: 20 BRPM | DIASTOLIC BLOOD PRESSURE: 90 MMHG

## 2017-10-12 DIAGNOSIS — Z12.5 SCREENING FOR PROSTATE CANCER: ICD-10-CM

## 2017-10-12 DIAGNOSIS — G47.00 INSOMNIA, UNSPECIFIED TYPE: ICD-10-CM

## 2017-10-12 DIAGNOSIS — R79.89 ELEVATED LFTS: ICD-10-CM

## 2017-10-12 DIAGNOSIS — R73.02 IMPAIRED GLUCOSE TOLERANCE: ICD-10-CM

## 2017-10-12 DIAGNOSIS — I10 ESSENTIAL HYPERTENSION, BENIGN: Primary | ICD-10-CM

## 2017-10-12 NOTE — PROGRESS NOTES
HISTORY OF PRESENT ILLNESS  Hai Dalal is a 56 y.o. male here to follow up. Noticed elevated LFT. he is trying to cut back on drinking beer. He has HTN,on med. doing well. no chest pain or palpitation or SOB. He has changed his work to day shift. Sleeping better now. Has prediabetes in past,A1C has been stable. Health maintenance are up to date. Hypertension      Cholesterol Problem         Review of Systems   Constitutional: Negative. HENT: Negative. Eyes: Negative. Respiratory: Negative. Cardiovascular: Negative. Gastrointestinal: Negative. Genitourinary: Negative. Musculoskeletal: Negative. Skin: Negative. Neurological: Negative. Psychiatric/Behavioral: Negative. Physical Exam   Constitutional: He appears well-developed and well-nourished. No distress. Neck: Normal range of motion. Neck supple. No JVD present. No thyromegaly present. Cardiovascular: Normal rate, regular rhythm, normal heart sounds and intact distal pulses. Pulmonary/Chest: Effort normal and breath sounds normal. No respiratory distress. He has no wheezes. Musculoskeletal: He exhibits no edema or tenderness. Psychiatric: He has a normal mood and affect. His behavior is normal.       ASSESSMENT and PLAN  Diagnoses and all orders for this visit:    1. Essential hypertension, benign    Stable. on med. Will check,  -     METABOLIC PANEL, COMPREHENSIVE    2. Elevated LFTs  Adv to avoid drinking alcohol. Will do,  -     METABOLIC PANEL, COMPREHENSIVE    3. Insomnia, unspecified type  better since he has changed shift. Use [employer] as needed. 4. Screening for prostate cancer  Will do,  -     PROSTATE SPECIFIC AG    5. Impaired glucose tolerance  A1C was Ok last time. Will check,  -     HEMOGLOBIN A1C WITH EAG        Follow-up Disposition: 4 months. Return if symptoms worsen or fail to improve.    Advised him to call back or return to office if symptoms worsen/change/persist. Discussed expected course/resolution/complications of diagnosis in detail with patient. Medication risks/benefits/costs/interactions/alternatives discussed with patient. He was given an after visit summary which includes diagnoses, current medications, & vitals. He expressed understanding with the diagnosis and plan.

## 2017-10-12 NOTE — PROGRESS NOTES
Health Maintenance Due   Topic Date Due    INFLUENZA AGE 9 TO ADULT  08/01/2017       Chief Complaint   Patient presents with    Hypertension     4 month follow up    Coronary Artery Disease    Cholesterol Problem       1. Have you been to the ER, urgent care clinic since your last visit? Hospitalized since your last visit? No    2. Have you seen or consulted any other health care providers outside of the [address] [place] [address] since your last visit? Include any pap smears or colon screening. No    3) Do you have an Advance Directive on file? no    4) Are you interested in receiving information on Advance Directives? NO      Patient is accompanied by self I have received verbal consent from Idania Contreras. to discuss any/all medical information while they are present in the room.

## 2017-10-12 NOTE — MR AVS SNAPSHOT
Visit Information Date & Time Provider Department Dept. Phone Encounter #  
 10/12/2017 10:00 AM William Medrano, [facility] [phone] [id number] Upcoming Health Maintenance Date Due COLONOSCOPY 11/10/2019 DTaP/Tdap/Td series (2 - Td) 6/8/2025 Allergies as of 10/12/2017  Review Complete On: 10/12/2017 By: William Medrano MD  
  
 Severity Noted Reaction Type Reactions Bee Sting [Sting, Bee] High 01/15/2010    Anaphylaxis Amlodipine  01/31/2011    Other (comments) Increased GERD Demerol [Meperidine]  01/15/2010   Intolerance Unknown (comments) Doubles over in pain Lisinopril  01/31/2011    Other (comments) Fatigue Current Immunizations  Reviewed on 6/12/2017 Name Date Influenza Vaccine 9/1/2005 TD Vaccine 1/15/2006 Tdap 6/8/2015 Not reviewed this visit You Were Diagnosed With   
  
 Codes Comments Essential hypertension, benign    -  Primary ICD-10-CM: I10 
ICD-9-CM: 401.1 Elevated LFTs     ICD-10-CM: R79.89 ICD-9-CM: 790.6 Insomnia, unspecified type     ICD-10-CM: G47.00 ICD-9-CM: 780.52 Screening for prostate cancer     ICD-10-CM: Z12.5 ICD-9-CM: V76.44 Impaired glucose tolerance     ICD-10-CM: R73.02 
ICD-9-CM: 790.22 Vitals BP Pulse Temp Resp Height(growth percentile) Weight(growth percentile) 140/90 (BP 1 Location: Left arm, BP Patient Position: Sitting) (!) 59 97.4 °F (36.3 °C) (Oral) 20 5' 9\" (1.753 m) 219 lb (99.3 kg) SpO2 BMI Smoking Status 99% 32.34 kg/m2 Never Smoker Vitals History BMI and BSA Data Body Mass Index Body Surface Area  
 32.34 kg/m 2 2.2 m 2 Preferred Pharmacy Pharmacy Name Phone [id number] [address] [address] [phone] Your Updated Medication List  
  
   
This list is accurate as of: 10/12/17 10:44 AM.  Always use your most recent med list.  
  
 aspirin 81 mg chewable tablet Take 1 tablet by mouth daily. atorvastatin 20 mg tablet Commonly known as:  LIPITOR  
TAKE 1 TABLET BY MOUTH DAILY. EPINEPHrine 0.3 mg/0.3 mL injection Commonly known as:  EPIPEN  
0.3 mL by IntraMUSCular route once as needed. fluticasone 50 mcg/actuation nasal spray Commonly known as:  Caffie Euler 2 Sprays by Both Nostrils route daily. hydroCHLOROthiazide 25 mg tablet Commonly known as:  HYDRODIURIL  
TAKE 1 TABLET BY MOUTH DAILY. NIACIN FLUSH FREE 400 mg niacin (500 mg) Cap Generic drug:  niacin-inositol Take 1 Cap by mouth daily. Omega-3-DHA-EPA-Fish Oil 1,000 mg (120 mg-180 mg) Cap Take 2 Tabs by mouth two (2) times a day. omeprazole 40 mg capsule Commonly known as:  PRILOSEC Take 1 Cap by mouth daily as needed. selenium 200 mcg Tab Take 400 mcg by mouth daily. VITAMIN B-6 100 mg tablet Generic drug:  pyridoxine (vitamin B6) Take 100 mg by mouth every other day. VITAMIN C 500 mg tablet Generic drug:  ascorbic acid (vitamin C) Take  by mouth.  
  
 zolpidem CR 12.5 mg tablet Commonly known as:  AMBIEN CR  
TAKE 1 TABLET BY MOUTH ONCE DAILY AT BEDTIME We Performed the Following HEMOGLOBIN A1C WITH EAG [92973 CPT(R)] METABOLIC PANEL, COMPREHENSIVE [12015 CPT(R)] PSA, DIAGNOSTIC (PROSTATE SPECIFIC AG) [id number] CPT(R)] Patient Instructions DASH Diet: Care Instructions Your Care Instructions The DASH diet is an eating plan that can help lower your blood pressure. DASH stands for Dietary Approaches to Stop Hypertension. Hypertension is high blood pressure. The DASH diet focuses on eating foods that are high in calcium, potassium, and magnesium. These nutrients can lower blood pressure. The foods that are highest in these nutrients are fruits, vegetables, low-fat dairy products, nuts, seeds, and legumes.  But taking calcium, potassium, and magnesium supplements instead of eating foods that are high in those nutrients does not have the same effect. The DASH diet also includes whole grains, fish, and poultry. The DASH diet is one of several lifestyle changes your doctor may recommend to lower your high blood pressure. Your doctor may also want you to decrease the amount of sodium in your diet. Lowering sodium while following the DASH diet can lower blood pressure even further than just the DASH diet alone. Follow-up care is a key part of your treatment and safety. Be sure to make and go to all appointments, and call your doctor if you are having problems. It's also a good idea to know your test results and keep a list of the medicines you take. How can you care for yourself at home? Following the DASH diet · Eat 4 to 5 servings of fruit each day. A serving is 1 medium-sized piece of fruit, ½ cup chopped or canned fruit, 1/4 cup dried fruit, or 4 ounces (½ cup) of fruit juice. Choose fruit more often than fruit juice. · Eat 4 to 5 servings of vegetables each day. A serving is 1 cup of lettuce or raw leafy vegetables, ½ cup of chopped or cooked vegetables, or 4 ounces (½ cup) of vegetable juice. Choose vegetables more often than vegetable juice. · Get 2 to 3 servings of low-fat and fat-free dairy each day. A serving is 8 ounces of milk, 1 cup of yogurt, or 1 ½ ounces of cheese. · Eat 6 to 8 servings of grains each day. A serving is 1 slice of bread, 1 ounce of dry cereal, or ½ cup of cooked rice, pasta, or cooked cereal. Try to choose whole-grain products as much as possible. · Limit lean meat, poultry, and fish to 2 servings each day. A serving is 3 ounces, about the size of a deck of cards. · Eat 4 to 5 servings of nuts, seeds, and legumes (cooked dried beans, lentils, and split peas) each week. A serving is 1/3 cup of nuts, 2 tablespoons of seeds, or ½ cup of cooked beans or peas. · Limit fats and oils to 2 to 3 servings each day. A serving is 1 teaspoon of vegetable oil or 2 tablespoons of salad dressing. · Limit sweets and added sugars to 5 servings or less a week. A serving is 1 tablespoon jelly or jam, ½ cup sorbet, or 1 cup of lemonade. · Eat less than 2,300 milligrams (mg) of sodium a day. If you limit your sodium to 1,500 mg a day, you can lower your blood pressure even more. Tips for success · Start small. Do not try to make dramatic changes to your diet all at once. You might feel that you are missing out on your favorite foods and then be more likely to not follow the plan. Make small changes, and stick with them. Once those changes become habit, add a few more changes. · Try some of the following: ¨ Make it a goal to eat a fruit or vegetable at every meal and at snacks. This will make it easy to get the recommended amount of fruits and vegetables each day. ¨ Try yogurt topped with fruit and nuts for a snack or healthy dessert. ¨ Add lettuce, tomato, cucumber, and onion to sandwiches. ¨ Combine a ready-made pizza crust with low-fat mozzarella cheese and lots of vegetable toppings. Try using tomatoes, squash, spinach, broccoli, carrots, cauliflower, and onions. ¨ Have a variety of cut-up vegetables with a low-fat dip as an appetizer instead of chips and dip. ¨ Sprinkle sunflower seeds or chopped almonds over salads. Or try adding chopped walnuts or almonds to cooked vegetables. ¨ Try some vegetarian meals using beans and peas. Add garbanzo or kidney beans to salads. Make burritos and tacos with mashed sandoval beans or black beans. Where can you learn more? Go to http://chalo-tahir.info/. Enter A888 in the search box to learn more about \"DASH Diet: Care Instructions. \" Current as of: April 3, 2017 Content Version: 11.3 © 8962-8332 [employer].  Care instructions adapted under license by Liam Ferrera (which disclaims liability or warranty for this information). If you have questions about a medical condition or this instruction, always ask your healthcare professional. [address] any warranty or liability for your use of this information. The problem of recurrent insomnia is discussed. Avoidance of caffeine sources is strongly encouraged. Sleep hygiene issues are reviewed. The use of sedative hypnotics for temporary relief is appropriate; we discussed the addictive nature of these drugs, and a one-time only prescription for prn use of a hypnotic is given, to use no more than 3 times per week for 2-3 weeks. Introducing [facility]! Blessing Feldman introduces [facility] patient portal. Now you can access parts of your medical record, email your doctor's office, and request medication refills online. 1. In your internet browser, go to [email or web address] 2. Click on the First Time User? Click Here link in the Sign In box. You will see the New Member Sign Up page. 3. Enter your Araca Access Code exactly as it appears below. You will not need to use this code after youve completed the sign-up process. If you do not sign up before the expiration date, you must request a new code. · [employer]aca Access Code: [email or web address] Expires: 1/10/2018 10:44 AM 
 
4. Enter the last four digits of your Social Security Number (xxxx) and Date of Birth (mm/dd/yyyy) as indicated and click Submit. You will be taken to the next sign-up page. 5. Create a Araca ID. This will be your [employer] login ID and cannot be changed, so think of one that is secure and easy to remember. 6. Create a [employer]aca password. You can change your password at any time. 7. Enter your Password Reset Question and Answer. This can be used at a later time if you forget your password. 8. Enter your e-mail address.  You will receive e-mail notification when new information is available in [email or web address]. 9. Click Sign Up. You can now view and download portions of your medical record. 10. Click the Download Summary menu link to download a portable copy of your medical information. If you have questions, please visit the Frequently Asked Questions section of the [employer] website. Remember, [employer] is NOT to be used for urgent needs. For medical emergencies, dial 911. Now available from your iPhone and Android! Please provide this summary of care documentation to your next provider. Your primary care clinician is listed as Dariel Kearns. If you have any questions after today's visit, please call [phone].

## 2017-10-12 NOTE — LETTER
10/19/2017 9:45 AM 
 
Mr. Yamini Davison. [facility] [id number] Dear Yamini Davison.: 
 
Please find your most recent results below. Resulted Orders METABOLIC PANEL, COMPREHENSIVE Result Value Ref Range Glucose 103 (H) 65 - 99 mg/dL BUN 13 6 - 24 mg/dL Creatinine 0.92 0.76 - 1.27 mg/dL GFR est non-AA 93 >59 mL/min/1.73 GFR est  >59 mL/min/1.73  
 BUN/Creatinine ratio 14 9 - 20 Sodium 143 134 - 144 mmol/L Potassium 4.3 3.5 - 5.2 mmol/L Chloride 99 96 - 106 mmol/L  
 CO2 27 18 - 29 mmol/L Calcium 9.3 8.7 - 10.2 mg/dL Protein, total 7.0 6.0 - 8.5 g/dL Albumin 4.4 3.5 - 5.5 g/dL GLOBULIN, TOTAL 2.6 1.5 - 4.5 g/dL A-G Ratio 1.7 1.2 - 2.2 Bilirubin, total 0.4 0.0 - 1.2 mg/dL Alk. phosphatase 59 39 - 117 IU/L  
 AST (SGOT) 28 0 - 40 IU/L  
 ALT (SGPT) 36 0 - 44 IU/L Narrative Performed at:  [address]  [id number] : Caroline Dacosta MD, Phone:  [phone] PSA, DIAGNOSTIC (PROSTATE SPECIFIC AG) Result Value Ref Range Prostate Specific Ag 0.9 0.0 - 4.0 ng/mL Comment:  
   Roche ECLIA methodology. According to the American Urological Association, Serum PSA should 
decrease and remain at undetectable levels after radical 
prostatectomy. The AUA defines biochemical recurrence as an initial 
PSA value 0.2 ng/mL or greater followed by a subsequent confirmatory PSA value 0.2 ng/mL or greater. Values obtained with different assay methods or kits cannot be used 
interchangeably. Results cannot be interpreted as absolute evidence 
of the presence or absence of malignant disease. Narrative Performed at:  [address]  [id number] : Caroline Dacosta MD, Phone:  [phone] HEMOGLOBIN A1C WITH EAG Result Value Ref Range Hemoglobin A1c 5.3 4.8 - 5.6 % Comment: Pre-diabetes: 5.7 - 6.4 Diabetes: >6.4 Glycemic control for adults with diabetes: <7.0 Estimated average glucose 105 mg/dL Narrative Performed at:  [address]  [id number] : Darletta Dancer MD, Phone:  [phone] RECOMMENDATIONS: 
 
Labs stable. Please call me if you have any questions: [phone] Sincerely, Richelle Lopez MD

## 2017-10-19 LAB
ALBUMIN SERPL-MCNC: 4.4 G/DL (ref 3.5–5.5)
ALBUMIN/GLOB SERPL: 1.7 {RATIO} (ref 1.2–2.2)
ALP SERPL-CCNC: 59 IU/L (ref 39–117)
ALT SERPL-CCNC: 36 IU/L (ref 0–44)
AST SERPL-CCNC: 28 IU/L (ref 0–40)
BILIRUB SERPL-MCNC: 0.4 MG/DL (ref 0–1.2)
BUN SERPL-MCNC: 13 MG/DL (ref 6–24)
BUN/CREAT SERPL: 14 (ref 9–20)
CALCIUM SERPL-MCNC: 9.3 MG/DL (ref 8.7–10.2)
CHLORIDE SERPL-SCNC: 99 MMOL/L (ref 96–106)
CO2 SERPL-SCNC: 27 MMOL/L (ref 18–29)
CREAT SERPL-MCNC: 0.92 MG/DL (ref 0.76–1.27)
EST. AVERAGE GLUCOSE BLD GHB EST-MCNC: 105 MG/DL
GLOBULIN SER CALC-MCNC: 2.6 G/DL (ref 1.5–4.5)
GLUCOSE SERPL-MCNC: 103 MG/DL (ref 65–99)
HBA1C MFR BLD: 5.3 % (ref 4.8–5.6)
POTASSIUM SERPL-SCNC: 4.3 MMOL/L (ref 3.5–5.2)
PROT SERPL-MCNC: 7 G/DL (ref 6–8.5)
PSA SERPL-MCNC: 0.9 NG/ML (ref 0–4)
SODIUM SERPL-SCNC: 143 MMOL/L (ref 134–144)

## 2017-10-22 DIAGNOSIS — R79.89 ELEVATED LFTS: ICD-10-CM

## 2017-11-09 RX ORDER — ZOLPIDEM TARTRATE 12.5 MG/1
TABLET, FILM COATED, EXTENDED RELEASE ORAL
Qty: 30 TAB | Refills: 0 | Status: SHIPPED | OUTPATIENT
Start: 2017-11-09 | End: 2017-11-11 | Stop reason: SDUPTHER

## 2017-11-13 RX ORDER — ZOLPIDEM TARTRATE 12.5 MG/1
TABLET, FILM COATED, EXTENDED RELEASE ORAL
Qty: 30 TAB | Refills: 0 | Status: SHIPPED | OUTPATIENT
Start: 2017-11-13 | End: 2018-01-16 | Stop reason: SDUPTHER

## 2018-04-06 DIAGNOSIS — F51.01 PRIMARY INSOMNIA: ICD-10-CM

## 2018-04-09 RX ORDER — ZOLPIDEM TARTRATE 12.5 MG/1
TABLET, FILM COATED, EXTENDED RELEASE ORAL
Qty: 30 TAB | Refills: 0 | OUTPATIENT
Start: 2018-04-09 | End: 2018-05-01 | Stop reason: SDUPTHER

## 2018-05-01 ENCOUNTER — OFFICE VISIT (OUTPATIENT)
Dept: INTERNAL MEDICINE CLINIC | Age: 58
End: 2018-05-01

## 2018-05-01 VITALS
HEART RATE: 71 BPM | HEIGHT: 69 IN | SYSTOLIC BLOOD PRESSURE: 136 MMHG | BODY MASS INDEX: 32.61 KG/M2 | TEMPERATURE: 96 F | WEIGHT: 220.2 LBS | OXYGEN SATURATION: 99 % | DIASTOLIC BLOOD PRESSURE: 84 MMHG | RESPIRATION RATE: 18 BRPM

## 2018-05-01 DIAGNOSIS — Z13.21 ENCOUNTER FOR VITAMIN DEFICIENCY SCREENING: ICD-10-CM

## 2018-05-01 DIAGNOSIS — E78.2 MIXED HYPERLIPIDEMIA: ICD-10-CM

## 2018-05-01 DIAGNOSIS — F51.01 PRIMARY INSOMNIA: ICD-10-CM

## 2018-05-01 DIAGNOSIS — I10 ESSENTIAL HYPERTENSION, BENIGN: Primary | ICD-10-CM

## 2018-05-01 RX ORDER — ZOLPIDEM TARTRATE 12.5 MG/1
TABLET, FILM COATED, EXTENDED RELEASE ORAL
Qty: 30 TAB | Refills: 0 | Status: SHIPPED | OUTPATIENT
Start: 2018-05-01 | End: 2018-09-21 | Stop reason: SDUPTHER

## 2018-05-01 RX ORDER — PETROLATUM,WHITE/LANOLIN
OINTMENT (GRAM) TOPICAL 3 TIMES DAILY
COMMUNITY
End: 2022-04-20

## 2018-05-01 NOTE — PROGRESS NOTES
Subjective:     Chief Complaint   Patient presents with    Hypertension    GERD    Insomnia    Cholesterol Problem       History of Present Illness    Jacy Shoemaker is a 56y.o. year old male who is a patient of Dr. Dmitry Arnold that presents today for follow-up. He reports a hx of HTN, GERD, insomnia, and CAD. He reports feeling generally well today. He states he continues to have issues with insomnia. He has been taking Ambien with relief. He states he does not take it every night. He also uses OTC medications with moderate relief. No other new complaints today. Needs paperwork completed for work physical.   No CP, SOB, GI, or  symptoms. Reviewed medications, recent lab work and imaging with patient. Pt reports compliance with medications. Current Outpatient Prescriptions on File Prior to Visit   Medication Sig Dispense Refill    atorvastatin (LIPITOR) 20 mg tablet TAKE 1 TABLET BY MOUTH DAILY. 30 Tab 6    hydroCHLOROthiazide (HYDRODIURIL) 25 mg tablet TAKE 1 TABLET BY MOUTH DAILY. 30 Tab 6    omeprazole (PRILOSEC) 40 mg capsule Take 1 Cap by mouth daily as needed. 30 Cap 3    Omega-3-DHA-EPA-Fish Oil 1,000 mg (120 mg-180 mg) cap Take 2 Tabs by mouth two (2) times a day. 1 Cap 0    EPINEPHrine (EPIPEN) 0.3 mg/0.3 mL injection 0.3 mL by IntraMUSCular route once as needed. 2 Syringe 1    niacin-inositol (NIACIN FLUSH FREE) 400 mg niacin (500 mg) cap Take 1 Cap by mouth daily.  aspirin 81 mg chewable tablet Take 1 tablet by mouth daily.  selenium 200 mcg Tab Take 400 mcg by mouth daily.  ascorbic acid (VITAMIN C) 500 mg tablet Take  by mouth.  pyridoxine (VITAMIN B-6) 100 mg tablet Take 100 mg by mouth every other day.  fluticasone (FLONASE) 50 mcg/actuation nasal spray 2 Sprays by Both Nostrils route daily. 1 Bottle 0     No current facility-administered medications on file prior to visit.         Allergies   Allergen Reactions    Bee Sting [Sting, Bee] Anaphylaxis    Amlodipine Other (comments)     Increased GERD    Demerol [Meperidine] Unknown (comments)     Doubles over in pain    Lisinopril Other (comments)     Fatigue        Past Medical History:   Diagnosis Date    CAD (coronary artery disease) 2016    mild; calcium scoring score=46    GERD (gastroesophageal reflux disease)     Hypertension     Mixed hyperlipidemia     LDL goal <70    Prediabetes 09/2016    Rectal polyp 11/10/14    Rotator cuff tear, left     Dr. Kylie Burden Sleep disturbance       Past Surgical History:   Procedure Laterality Date    HX COLONOSCOPY  11/10/14    +rectal polyp (hyperplastic), Dr. Regina Abarca HX KNEE ARTHROSCOPY Right 2006    HX MENISCUS REPAIR Right 06/21/2016    Dr. Elmer Hutchinson Right 2008    HX ROTATOR CUFF REPAIR  09/2016    Dr. Stephanie Stroud History   Substance Use Topics    Smoking status: Never Smoker    Smokeless tobacco: Never Used    Alcohol use 1.8 - 3.0 oz/week     3 - 5 Standard drinks or equivalent per week      Comment: social      Family History   Problem Relation Age of Onset    Heart Disease Father      CAD, age 72    Diabetes Father     Cancer Father      lung--smoker    Cancer Paternal Uncle      lung - smoker        Objective:     Vitals:    05/01/18 1007   BP: 136/84   Pulse: 71   Resp: 18   Temp: 96 °F (35.6 °C)   TempSrc: Oral   SpO2: 99%   Weight: 220 lb 3.2 oz (99.9 kg)   Height: 5' 9\" (1.753 m)       Review of Systems   Constitutional: Negative. HENT: Negative. Eyes: Negative. Respiratory: Negative. Cardiovascular: Negative. Gastrointestinal: Negative. Genitourinary: Negative. Musculoskeletal: Negative. Skin: Negative. Neurological: Negative. Psychiatric/Behavioral: The patient has insomnia. Physical Exam   Constitutional: He is oriented to person, place, and time. He appears well-developed and well-nourished. No distress. Well-appearing  male.   NAD   HENT: Head: Normocephalic and atraumatic. Eyes: Conjunctivae and EOM are normal. Pupils are equal, round, and reactive to light. Neck: Normal range of motion. Neck supple. Cardiovascular: Normal rate, regular rhythm and normal heart sounds. Pulmonary/Chest: Effort normal and breath sounds normal. No respiratory distress. He has no wheezes. Abdominal: He exhibits no distension. Musculoskeletal: Normal range of motion. He exhibits no edema, tenderness or deformity. Neurological: He is alert and oriented to person, place, and time. Skin: Skin is warm and dry. No rash noted. He is not diaphoretic. No erythema. No pallor. Psychiatric: He has a normal mood and affect. His behavior is normal.   Nursing note and vitals reviewed. Assessment/ Plan:   Diagnoses and all orders for this visit:    1. Essential hypertension, benign   Will order  -     CBC W/O DIFF  -     METABOLIC PANEL, COMPREHENSIVE  -     TSH 3RD GENERATION  -     HEMOGLOBIN A1C WITH EAG  -     VITAMIN D, 25 HYDROXY    2. Mixed hyperlipidemia   Will order  -     CBC W/O DIFF  -     METABOLIC PANEL, COMPREHENSIVE  -     LIPID PANEL  -     HEMOGLOBIN A1C WITH EAG  -     VITAMIN D, 25 HYDROXY    3. Primary insomnia   Will refill  -     zolpidem CR (AMBIEN CR) 12.5 mg tablet; TAKE 1 TABLET BY MOUTH AT BEDTIME    4. Encounter for vitamin deficiency screening   Will order  -     VITAMIN D, 25 HYDROXY    Patient's plan of care has been reviewed with them. Patient and/or family have verbally conveyed their understanding and agreement of the patient's signs, symptoms, diagnosis, treatment and prognosis and additionally agree to follow up as recommended or return to [facility] should their condition change prior to follow-up.   Discharge instructions have also been provided to the patient with some educational information regarding their diagnosis as well a list of reasons why they would want to return to the office prior to their follow-up appointment should their condition change.     Follow-up with Dr. Simmons in 6 months

## 2018-05-01 NOTE — PATIENT INSTRUCTIONS
DASH Diet: Care Instructions  Your Care Instructions    The DASH diet is an eating plan that can help lower your blood pressure. DASH stands for Dietary Approaches to Stop Hypertension. Hypertension is high blood pressure. The DASH diet focuses on eating foods that are high in calcium, potassium, and magnesium. These nutrients can lower blood pressure. The foods that are highest in these nutrients are fruits, vegetables, low-fat dairy products, nuts, seeds, and legumes. But taking calcium, potassium, and magnesium supplements instead of eating foods that are high in those nutrients does not have the same effect. The DASH diet also includes whole grains, fish, and poultry. The DASH diet is one of several lifestyle changes your doctor may recommend to lower your high blood pressure. Your doctor may also want you to decrease the amount of sodium in your diet. Lowering sodium while following the DASH diet can lower blood pressure even further than just the DASH diet alone. Follow-up care is a key part of your treatment and safety. Be sure to make and go to all appointments, and call your doctor if you are having problems. It's also a good idea to know your test results and keep a list of the medicines you take. How can you care for yourself at home? Following the DASH diet  · Eat 4 to 5 servings of fruit each day. A serving is 1 medium-sized piece of fruit, ½ cup chopped or canned fruit, 1/4 cup dried fruit, or 4 ounces (½ cup) of fruit juice. Choose fruit more often than fruit juice. · Eat 4 to 5 servings of vegetables each day. A serving is 1 cup of lettuce or raw leafy vegetables, ½ cup of chopped or cooked vegetables, or 4 ounces (½ cup) of vegetable juice. Choose vegetables more often than vegetable juice. · Get 2 to 3 servings of low-fat and fat-free dairy each day. A serving is 8 ounces of milk, 1 cup of yogurt, or 1 ½ ounces of cheese. · Eat 6 to 8 servings of grains each day.  A serving is 1 slice of bread, 1 ounce of dry cereal, or ½ cup of cooked rice, pasta, or cooked cereal. Try to choose whole-grain products as much as possible. · Limit lean meat, poultry, and fish to 2 servings each day. A serving is 3 ounces, about the size of a deck of cards. · Eat 4 to 5 servings of nuts, seeds, and legumes (cooked dried beans, lentils, and split peas) each week. A serving is 1/3 cup of nuts, 2 tablespoons of seeds, or ½ cup of cooked beans or peas. · Limit fats and oils to 2 to 3 servings each day. A serving is 1 teaspoon of vegetable oil or 2 tablespoons of salad dressing. · Limit sweets and added sugars to 5 servings or less a week. A serving is 1 tablespoon jelly or jam, ½ cup sorbet, or 1 cup of lemonade. · Eat less than 2,300 milligrams (mg) of sodium a day. If you limit your sodium to 1,500 mg a day, you can lower your blood pressure even more. Tips for success  · Start small. Do not try to make dramatic changes to your diet all at once. You might feel that you are missing out on your favorite foods and then be more likely to not follow the plan. Make small changes, and stick with them. Once those changes become habit, add a few more changes. · Try some of the following:  ¨ Make it a goal to eat a fruit or vegetable at every meal and at snacks. This will make it easy to get the recommended amount of fruits and vegetables each day. ¨ Try yogurt topped with fruit and nuts for a snack or healthy dessert. ¨ Add lettuce, tomato, cucumber, and onion to sandwiches. ¨ Combine a ready-made pizza crust with low-fat mozzarella cheese and lots of vegetable toppings. Try using tomatoes, squash, spinach, broccoli, carrots, cauliflower, and onions. ¨ Have a variety of cut-up vegetables with a low-fat dip as an appetizer instead of chips and dip. ¨ Sprinkle sunflower seeds or chopped almonds over salads. Or try adding chopped walnuts or almonds to cooked vegetables.   ¨ Try some vegetarian meals using beans and peas. Add garbanzo or kidney beans to salads. Make burritos and tacos with mashed sandoval beans or black beans. Where can you learn more? Go to http://chalo-tahir.info/. Enter Q879 in the search box to learn more about \"DASH Diet: Care Instructions. \"  Current as of: September 21, 2016  Content Version: 11.4  © 3394-0892 [employer]. Care instructions adapted under license by [employer] (which disclaims liability or warranty for this information). If you have questions about a medical condition or this instruction, always ask your healthcare professional. [address] any warranty or liability for your use of this information. Insomnia: Care Instructions  Your Care Instructions    Insomnia is the inability to sleep well. It is a common problem for most people at some time. Insomnia may make it hard for you to get to sleep, stay asleep, or sleep as long as you need to. This can make you tired and grouchy during the day. It can also make you forgetful, less effective at work, and unhappy. Insomnia can be caused by conditions such as depression or anxiety. Pain can also affect your ability to sleep. When these problems are solved, the insomnia usually clears up. But sometimes bad sleep habits can cause insomnia. If insomnia is affecting your work or your enjoyment of life, you can take steps to improve your sleep. Follow-up care is a key part of your treatment and safety. Be sure to make and go to all appointments, and call your doctor if you are having problems. It's also a good idea to know your test results and keep a list of the medicines you take. How can you care for yourself at home? What to avoid  · Do not have drinks with caffeine, such as coffee or black tea, for 8 hours before bed. · Do not smoke or use other types of tobacco near bedtime. Nicotine is a stimulant and can keep you awake.   · Avoid drinking alcohol late in the evening, because it can cause you to wake in the middle of the night. · Do not eat a big meal close to bedtime. If you are hungry, eat a light snack. · Do not drink a lot of water close to bedtime, because the need to urinate may wake you up during the night. · Do not read or watch TV in bed. Use the bed only for sleeping and sexual activity. What to try  · Go to bed at the same time every night, and wake up at the same time every morning. Do not take naps during the day. · Keep your bedroom quiet, dark, and cool. · Sleep on a comfortable pillow and mattress. · If watching the clock makes you anxious, turn it facing away from you so you cannot see the time. · If you worry when you lie down, start a worry book. Well before bedtime, write down your worries, and then set the book and your concerns aside. · Try meditation or other relaxation techniques before you go to bed. · If you cannot fall asleep, get up and go to another room until you feel sleepy. Do something relaxing. Repeat your bedtime routine before you go to bed again. · Make your house quiet and calm about an hour before bedtime. Turn down the lights, turn off the TV, log off the computer, and turn down the volume on music. This can help you relax after a busy day. When should you call for help? Watch closely for changes in your health, and be sure to contact your doctor if:  ? · Your efforts to improve your sleep do not work. ? · Your insomnia gets worse. ? · You have been feeling down, depressed, or hopeless or have lost interest in things that you usually enjoy. Where can you learn more? Go to http://chalo-tahir.info/. Enter P513 in the search box to learn more about \"Insomnia: Care Instructions. \"  Current as of: July 26, 2016  Content Version: 11.4  © 1047-0405 [employer]. Care instructions adapted under license by [employer]. (which disclaims liability or warranty for this information). If you have questions about a medical condition or this instruction, always ask your healthcare professional. [address] any warranty or liability for your use of this information. High Blood Pressure: Care Instructions  Your Care Instructions    If your blood pressure is usually above 140/90, you have high blood pressure, or hypertension. That means the top number is 140 or higher or the bottom number is 90 or higher, or both. Despite what a lot of people think, high blood pressure usually doesn't cause headaches or make you feel dizzy or lightheaded. It usually has no symptoms. But it does increase your risk for heart attack, stroke, and kidney or eye damage. The higher your blood pressure, the more your risk increases. Your doctor will give you a goal for your blood pressure. Your goal will be based on your health and your age. An example of a goal is to keep your blood pressure below 140/90. Lifestyle changes, such as eating healthy and being active, are always important to help lower blood pressure. You might also take medicine to reach your blood pressure goal.  Follow-up care is a key part of your treatment and safety. Be sure to make and go to all appointments, and call your doctor if you are having problems. It's also a good idea to know your test results and keep a list of the medicines you take. How can you care for yourself at home? Medical treatment  · If you stop taking your medicine, your blood pressure will go back up. You may take one or more types of medicine to lower your blood pressure. Be safe with medicines. Take your medicine exactly as prescribed. Call your doctor if you think you are having a problem with your medicine. · Talk to your doctor before you start taking aspirin every day. Aspirin can help certain people lower their risk of a heart attack or stroke. But taking aspirin isn't right for everyone, because it can cause serious bleeding.   · See your doctor regularly. You may need to see the doctor more often at first or until your blood pressure comes down. · If you are taking blood pressure medicine, talk to your doctor before you take decongestants or anti-inflammatory medicine, such as ibuprofen. Some of these medicines can raise blood pressure. · Learn how to check your blood pressure at home. Lifestyle changes  · Stay at a healthy weight. This is especially important if you put on weight around the waist. Losing even 10 pounds can help you lower your blood pressure. · If your doctor recommends it, get more exercise. Walking is a good choice. Bit by bit, increase the amount you walk every day. Try for at least 30 minutes on most days of the week. You also may want to swim, bike, or do other activities. · Avoid or limit alcohol. Talk to your doctor about whether you can drink any alcohol. · Try to limit how much sodium you eat to less than 2,300 milligrams (mg) a day. Your doctor may ask you to try to eat less than 1,500 mg a day. · Eat plenty of fruits (such as bananas and oranges), vegetables, legumes, whole grains, and low-fat dairy products. · Lower the amount of saturated fat in your diet. Saturated fat is found in animal products such as milk, cheese, and meat. Limiting these foods may help you lose weight and also lower your risk for heart disease. · Do not smoke. Smoking increases your risk for heart attack and stroke. If you need help quitting, talk to your doctor about stop-smoking programs and medicines. These can increase your chances of quitting for good. When should you call for help? Call 911 anytime you think you may need emergency care. This may mean having symptoms that suggest that your blood pressure is causing a serious heart or blood vessel problem. Your blood pressure may be over 180/110. ? For example, call 911 if:  ? · You have symptoms of a heart attack.  These may include:  ¨ Chest pain or pressure, or a strange feeling in the chest.  ¨ Sweating. ¨ Shortness of breath. ¨ Nausea or vomiting. ¨ Pain, pressure, or a strange feeling in the back, neck, jaw, or upper belly or in one or both shoulders or arms. ¨ Lightheadedness or sudden weakness. ¨ A fast or irregular heartbeat. ? · You have symptoms of a stroke. These may include:  ¨ Sudden numbness, tingling, weakness, or loss of movement in your face, arm, or leg, especially on only one side of your body. ¨ Sudden vision changes. ¨ Sudden trouble speaking. ¨ Sudden confusion or trouble understanding simple statements. ¨ Sudden problems with walking or balance. ¨ A sudden, severe headache that is different from past headaches. ? · You have severe back or belly pain. ?Do not wait until your blood pressure comes down on its own. Get help right away. ?Call your doctor now or seek immediate care if:  ? · Your blood pressure is much higher than normal (such as 180/110 or higher), but you don't have symptoms. ? · You think high blood pressure is causing symptoms, such as:  ¨ Severe headache. ¨ Blurry vision. ? Watch closely for changes in your health, and be sure to contact your doctor if:  ? · Your blood pressure measures 140/90 or higher at least 2 times. That means the top number is 140 or higher or the bottom number is 90 or higher, or both. ? · You think you may be having side effects from your blood pressure medicine. ? · Your blood pressure is usually normal, but it goes above normal at least 2 times. Where can you learn more? Go to http://chalo-tahir.info/. Enter J147 in the search box to learn more about \"High Blood Pressure: Care Instructions. \"  Current as of: September 21, 2016  Content Version: 11.4  © 2688-9918 [employer]. Care instructions adapted under license by [employer] (which disclaims liability or warranty for this information).  If you have questions about a medical condition or this instruction, always ask your healthcare professional. [place] [address] any warranty or liability for your use of this information.

## 2018-05-01 NOTE — PROGRESS NOTES
There are no preventive care reminders to display for this patient. Chief Complaint   Patient presents with    Hypertension    GERD    Insomnia    Cholesterol Problem       1. Have you been to the ER, urgent care clinic since your last visit? Hospitalized since your last visit? No    2. Have you seen or consulted any other health care providers outside of the [address] [place] [address] since your last visit? Include any pap smears or colon screening. No    3) Do you have an Advance Directive on file? no    4) Are you interested in receiving information on Advance Directives? NO      Patient is accompanied by self I have received verbal consent from Patricia Garcia. to discuss any/all medical information while they are present in the room.

## 2018-05-01 NOTE — LETTER
5/2/2018 10:37 AM 
 
Mr. Jayla Kang. [facility] [id number] Dear Jayla Kang.: 
 
Please find your most recent results below. Resulted Orders CBC W/O DIFF Result Value Ref Range WBC 5.1 3.4 - 10.8 x10E3/uL  
 RBC 4.49 4.14 - 5.80 x10E6/uL HGB 14.3 13.0 - 17.7 g/dL HCT 42.3 37.5 - 51.0 % MCV 94 79 - 97 fL  
 MCH 31.8 26.6 - 33.0 pg  
 MCHC 33.8 31.5 - 35.7 g/dL  
 RDW 14.2 12.3 - 15.4 % PLATELET 600 051 - 527 x10E3/uL Narrative Performed at:  [address]  [id number] : Maria Guadalupe Musa MD, Phone:  [phone] METABOLIC PANEL, COMPREHENSIVE Result Value Ref Range Glucose 109 (H) 65 - 99 mg/dL BUN 17 6 - 24 mg/dL Creatinine 0.75 (L) 0.76 - 1.27 mg/dL GFR est non- >59 mL/min/1.73 GFR est  >59 mL/min/1.73  
 BUN/Creatinine ratio 23 (H) 9 - 20 Sodium 139 134 - 144 mmol/L Potassium 4.3 3.5 - 5.2 mmol/L Chloride 99 96 - 106 mmol/L  
 CO2 27 18 - 29 mmol/L Calcium 9.2 8.7 - 10.2 mg/dL Protein, total 7.0 6.0 - 8.5 g/dL Albumin 4.3 3.5 - 5.5 g/dL GLOBULIN, TOTAL 2.7 1.5 - 4.5 g/dL A-G Ratio 1.6 1.2 - 2.2 Bilirubin, total 0.8 0.0 - 1.2 mg/dL Alk. phosphatase 66 39 - 117 IU/L  
 AST (SGOT) 31 0 - 40 IU/L  
 ALT (SGPT) 44 0 - 44 IU/L Narrative Performed at:  [address]  [id number] : Maria Guadalupe Musa MD, Phone:  [phone] LIPID PANEL Result Value Ref Range Cholesterol, total 144 100 - 199 mg/dL Triglyceride 173 (H) 0 - 149 mg/dL HDL Cholesterol 42 >39 mg/dL VLDL, calculated 35 5 - 40 mg/dL LDL, calculated 67 0 - 99 mg/dL Narrative Performed at:  [address]  [id number] : Maria Guadalupe Musa MD, Phone:  [phone] TSH 3RD GENERATION Result Value Ref Range TSH 1.090 0.450 - 4.500 uIU/mL Narrative Performed at:  [address]  [id number] : Dmitry Rey MD, Phone:  [phone] HEMOGLOBIN A1C WITH EAG Result Value Ref Range Hemoglobin A1c 5.3 4.8 - 5.6 % Comment:  
            Pre-diabetes: 5.7 - 6.4 Diabetes: >6.4 Glycemic control for adults with diabetes: <7.0 Estimated average glucose 105 mg/dL Narrative Performed at:  [address]  [id number] : Dmitry Rey MD, Phone:  [phone] VITAMIN D, 25 HYDROXY Result Value Ref Range VITAMIN D, 25-HYDROXY 21.9 (L) 30.0 - 100.0 ng/mL Comment:  
   Vitamin D deficiency has been defined by the [address] [place] [address] practice guideline as a 
level of serum 25-OH vitamin D less than 20 ng/mL (1,2). The Endocrine Society went on to further define vitamin D 
insufficiency as a level between 21 and 29 ng/mL (2). 1. IOM ([place] of Medicine). 2010. Dietary reference 
   intakes for calcium and D. [address] [place] [address]: The 
   Last Guide. 2. Conor MF, Kitty [place], [place] ROMERO, et al. 
   Evaluation, treatment, and prevention of vitamin D 
   deficiency: an Endocrine Society clinical practice 
   guideline. JCEM. 2011 Jul; 96(7):1911-30. Narrative Performed at:  [address]  [id number] : Dmitry Rey MD, Phone:  [phone] CVD REPORT Result Value Ref Range INTERPRETATION Note Comment:  
   Supplemental report is available. Narrative Performed at:  [address] [place] [address]  [id number] : Rabia De La Rosa MD, Phone:  [phone] RECOMMENDATIONS: 
 
Patient was called w/ the following information given by my nurse: 1.  Triglycerides slightly elevated.  Encourage patient to lose weight, losing 5-10 pounds can lower triglycerides by 20%, cut sugar, increase fiber intake, limit alcohol, exercise as tolerated, and cut back on starches. 2.  Low vit D. Adv to take OTC vit D 1000 unit po every day for 4 months. Other labs stable. Please call me if you have any questions: [phone] Sincerely, Ashish Romero NP

## 2018-05-01 NOTE — MR AVS SNAPSHOT
[address] [facility] [address] [facility] 13 
[phone] Patient: Calderon Kelley. MRN: [id number] :1960 Visit Information Date & Time Provider Department Dept. Phone Encounter #  
 2018 10:00 AM Holland Mendoza, [address] [facility] [phone] [id number] Upcoming Health Maintenance Date Due Influenza Age 5 to Adult 2018 COLONOSCOPY 11/10/2019 DTaP/Tdap/Td series (2 - Td) 2025 Allergies as of 2018  Review Complete On: 2018 By: Holland Mendoza NP Severity Noted Reaction Type Reactions Bee Sting [Sting, Bee] High 01/15/2010    Anaphylaxis Amlodipine  2011    Other (comments) Increased GERD Demerol [Meperidine]  01/15/2010   Intolerance Unknown (comments) Doubles over in pain Lisinopril  2011    Other (comments) Fatigue Current Immunizations  Reviewed on 2017 Name Date Influenza Vaccine 2005 TD Vaccine 1/15/2006 Tdap 2015 Not reviewed this visit You Were Diagnosed With   
  
 Codes Comments Insomnia, unspecified type    -  Primary ICD-10-CM: G47.00 ICD-9-CM: 780.52 Essential hypertension, benign     ICD-10-CM: I10 
ICD-9-CM: 401.1 Mixed hyperlipidemia     ICD-10-CM: E78.2 ICD-9-CM: 272.2 Primary insomnia     ICD-10-CM: F51.01 
ICD-9-CM: 307.42 Vitals BP Pulse Temp Resp Height(growth percentile) Weight(growth percentile) 136/84 (BP 1 Location: Right arm, BP Patient Position: Sitting) 71 96 °F (35.6 °C) (Oral) 18 5' 9\" (1.753 m) 220 lb 3.2 oz (99.9 kg) SpO2 BMI Smoking Status 99% 32.52 kg/m2 Never Smoker Vitals History BMI and BSA Data Body Mass Index Body Surface Area 32.52 kg/m 2 2.21 m 2 Preferred Pharmacy Pharmacy Name Phone  [id number] [address] [address], [address] [phone] Your Updated Medication List  
  
   
This list is accurate as of 5/1/18 10:26 AM.  Always use your most recent med list.  
  
  
  
  
 aspirin 81 mg chewable tablet Take 1 tablet by mouth daily. atorvastatin 20 mg tablet Commonly known as:  LIPITOR  
TAKE 1 TABLET BY MOUTH DAILY. EPINEPHrine 0.3 mg/0.3 mL injection Commonly known as:  EPIPEN  
0.3 mL by IntraMUSCular route once as needed. fluticasone 50 mcg/actuation nasal spray Commonly known as:  Fredis Castillo 2 Sprays by Both Nostrils route daily. glucosamine sulfate 500 mg capsule Take  by mouth three (3) times daily. hydroCHLOROthiazide 25 mg tablet Commonly known as:  HYDRODIURIL  
TAKE 1 TABLET BY MOUTH DAILY. NIACIN FLUSH FREE 400 mg niacin (500 mg) Cap Generic drug:  niacin-inositol Take 1 Cap by mouth daily. omega 3-DHA-EPA-fish oil 1,000 mg (120 mg-180 mg) capsule Take 2 Tabs by mouth two (2) times a day. omeprazole 40 mg capsule Commonly known as:  PRILOSEC Take 1 Cap by mouth daily as needed. selenium 200 mcg Tab Take 400 mcg by mouth daily. VITAMIN B-6 100 mg tablet Generic drug:  pyridoxine (vitamin B6) Take 100 mg by mouth every other day. VITAMIN C 500 mg tablet Generic drug:  ascorbic acid (vitamin C) Take  by mouth.  
  
 zolpidem CR 12.5 mg tablet Commonly known as:  AMBIEN CR  
TAKE 1 TABLET BY MOUTH AT BEDTIME Prescriptions Printed Refills  
 zolpidem CR (AMBIEN CR) 12.5 mg tablet 0 Sig: TAKE 1 TABLET BY MOUTH AT BEDTIME Class: Print We Performed the Following CBC W/O DIFF [42456 CPT(R)] HEMOGLOBIN A1C WITH EAG [86313 CPT(R)] LIPID PANEL [98439 CPT(R)] METABOLIC PANEL, COMPREHENSIVE [73094 CPT(R)] TSH 3RD GENERATION [06772 CPT(R)] VITAMIN D, 25 HYDROXY [id number] CPT(R)] Introducing [facility]! [employer] introduces GoSpotCheck patient portal. Now you can access parts of your medical record, email your doctor's office, and request medication refills online. 1. In your internet browser, go to [email or web address] 2. Click on the First Time User? Click Here link in the Sign In box. You will see the New Member Sign Up page. 3. Enter your GoSpotCheck Access Code exactly as it appears below. You will not need to use this code after youve completed the sign-up process. If you do not sign up before the expiration date, you must request a new code. · GoSpotCheck Access Code: [email or web address] Expires: 7/30/2018 10:26 AM 
 
4. Enter the last four digits of your Social Security Number (xxxx) and Date of Birth (mm/dd/yyyy) as indicated and click Submit. You will be taken to the next sign-up page. 5. Create a GoSpotCheck ID. This will be your GoSpotCheck login ID and cannot be changed, so think of one that is secure and easy to remember. 6. Create a GoSpotCheck password. You can change your password at any time. 7. Enter your Password Reset Question and Answer. This can be used at a later time if you forget your password. 8. Enter your e-mail address. You will receive e-mail notification when new information is available in [address] 9. Click Sign Up. You can now view and download portions of your medical record. 10. Click the Download Summary menu link to download a portable copy of your medical information. If you have questions, please visit the Frequently Asked Questions section of the GoSpotCheck website. Remember, GoSpotCheck is NOT to be used for urgent needs. For medical emergencies, dial 911. Now available from your iPhone and Android! Please provide this summary of care documentation to your next provider. Your primary care clinician is listed as Lawerance Mis. If you have any questions after today's visit, please call [phone].

## 2018-05-02 LAB
25(OH)D3+25(OH)D2 SERPL-MCNC: 21.9 NG/ML (ref 30–100)
ALBUMIN SERPL-MCNC: 4.3 G/DL (ref 3.5–5.5)
ALBUMIN/GLOB SERPL: 1.6 {RATIO} (ref 1.2–2.2)
ALP SERPL-CCNC: 66 IU/L (ref 39–117)
ALT SERPL-CCNC: 44 IU/L (ref 0–44)
AST SERPL-CCNC: 31 IU/L (ref 0–40)
BILIRUB SERPL-MCNC: 0.8 MG/DL (ref 0–1.2)
BUN SERPL-MCNC: 17 MG/DL (ref 6–24)
BUN/CREAT SERPL: 23 (ref 9–20)
CALCIUM SERPL-MCNC: 9.2 MG/DL (ref 8.7–10.2)
CHLORIDE SERPL-SCNC: 99 MMOL/L (ref 96–106)
CHOLEST SERPL-MCNC: 144 MG/DL (ref 100–199)
CO2 SERPL-SCNC: 27 MMOL/L (ref 18–29)
CREAT SERPL-MCNC: 0.75 MG/DL (ref 0.76–1.27)
ERYTHROCYTE [DISTWIDTH] IN BLOOD BY AUTOMATED COUNT: 14.2 % (ref 12.3–15.4)
EST. AVERAGE GLUCOSE BLD GHB EST-MCNC: 105 MG/DL
GFR SERPLBLD CREATININE-BSD FMLA CKD-EPI: 103 ML/MIN/1.73
GFR SERPLBLD CREATININE-BSD FMLA CKD-EPI: 119 ML/MIN/1.73
GLOBULIN SER CALC-MCNC: 2.7 G/DL (ref 1.5–4.5)
GLUCOSE SERPL-MCNC: 109 MG/DL (ref 65–99)
HBA1C MFR BLD: 5.3 % (ref 4.8–5.6)
HCT VFR BLD AUTO: 42.3 % (ref 37.5–51)
HDLC SERPL-MCNC: 42 MG/DL
HGB BLD-MCNC: 14.3 G/DL (ref 13–17.7)
INTERPRETATION, 910389: NORMAL
LDLC SERPL CALC-MCNC: 67 MG/DL (ref 0–99)
MCH RBC QN AUTO: 31.8 PG (ref 26.6–33)
MCHC RBC AUTO-ENTMCNC: 33.8 G/DL (ref 31.5–35.7)
MCV RBC AUTO: 94 FL (ref 79–97)
PLATELET # BLD AUTO: 159 X10E3/UL (ref 150–379)
POTASSIUM SERPL-SCNC: 4.3 MMOL/L (ref 3.5–5.2)
PROT SERPL-MCNC: 7 G/DL (ref 6–8.5)
RBC # BLD AUTO: 4.49 X10E6/UL (ref 4.14–5.8)
SODIUM SERPL-SCNC: 139 MMOL/L (ref 134–144)
TRIGL SERPL-MCNC: 173 MG/DL (ref 0–149)
TSH SERPL DL<=0.005 MIU/L-ACNC: 1.09 UIU/ML (ref 0.45–4.5)
VLDLC SERPL CALC-MCNC: 35 MG/DL (ref 5–40)
WBC # BLD AUTO: 5.1 X10E3/UL (ref 3.4–10.8)

## 2018-05-02 NOTE — PROGRESS NOTES
1.  Triglycerides slightly elevated. Encourage patient to lose weight, losing 5-10 pounds can lower triglycerides by 20%, cut sugar, increase fiber intake, limit alcohol, exercise as tolerated, and cut back on starches. 2.  Low vit D. Adv to take OTC vit D 1000 unit po every day for 4 months. Other labs stable.

## 2018-07-03 RX ORDER — HYDROCHLOROTHIAZIDE 25 MG/1
TABLET ORAL
Qty: 30 TAB | Refills: 6 | Status: SHIPPED | OUTPATIENT
Start: 2018-07-03 | End: 2018-12-19 | Stop reason: ALTCHOICE

## 2018-09-21 DIAGNOSIS — F51.01 PRIMARY INSOMNIA: ICD-10-CM

## 2018-09-24 RX ORDER — ZOLPIDEM TARTRATE 12.5 MG/1
TABLET, FILM COATED, EXTENDED RELEASE ORAL
Qty: 30 TAB | Refills: 0 | Status: SHIPPED | OUTPATIENT
Start: 2018-09-24 | End: 2018-12-07 | Stop reason: SDUPTHER

## 2018-10-13 DIAGNOSIS — K21.9 GASTROESOPHAGEAL REFLUX DISEASE WITHOUT ESOPHAGITIS: ICD-10-CM

## 2018-10-15 RX ORDER — OMEPRAZOLE 40 MG/1
CAPSULE, DELAYED RELEASE ORAL
Qty: 30 CAP | Refills: 2 | Status: SHIPPED | OUTPATIENT
Start: 2018-10-15 | End: 2019-08-11 | Stop reason: SDUPTHER

## 2018-10-31 ENCOUNTER — OFFICE VISIT (OUTPATIENT)
Dept: INTERNAL MEDICINE CLINIC | Age: 58
End: 2018-10-31

## 2018-10-31 VITALS
BODY MASS INDEX: 32.91 KG/M2 | HEIGHT: 69 IN | DIASTOLIC BLOOD PRESSURE: 97 MMHG | RESPIRATION RATE: 18 BRPM | TEMPERATURE: 96.9 F | OXYGEN SATURATION: 95 % | HEART RATE: 66 BPM | SYSTOLIC BLOOD PRESSURE: 157 MMHG | WEIGHT: 222.2 LBS

## 2018-10-31 DIAGNOSIS — R79.89 ELEVATED LFTS: ICD-10-CM

## 2018-10-31 DIAGNOSIS — Z12.5 SCREENING FOR PROSTATE CANCER: ICD-10-CM

## 2018-10-31 DIAGNOSIS — I10 ESSENTIAL HYPERTENSION, BENIGN: Primary | ICD-10-CM

## 2018-10-31 DIAGNOSIS — E55.9 VITAMIN D DEFICIENCY: ICD-10-CM

## 2018-10-31 DIAGNOSIS — E78.2 MIXED HYPERLIPIDEMIA: ICD-10-CM

## 2018-10-31 DIAGNOSIS — R73.03 PREDIABETES: ICD-10-CM

## 2018-10-31 NOTE — PROGRESS NOTES
HISTORY OF PRESENT ILLNESS  John Veloz is a 66 y.o. male here to follow up. Has elevated lipid, on statin. No myalgia. He has HTN,on med. doing well. no chest pain or palpitation or SOB. He works at night. Use Ambien during night shift. No need for refill. Had elevated liver function test due to increased alcohol intake in the past.  He is not drinking much. Will repeat lab work. Has prediabetes in past,A1C has been stable. Need lab work. Hypertension      Cholesterol Problem         Review of Systems   Constitutional: Negative. HENT: Negative. Eyes: Negative. Respiratory: Negative. Cardiovascular: Negative. Gastrointestinal: Negative. Genitourinary: Negative. Musculoskeletal: Negative. Skin: Negative. Neurological: Negative. Psychiatric/Behavioral: Negative. Physical Exam   Constitutional: He appears well-developed and well-nourished. No distress. Neck: Normal range of motion. Neck supple. No JVD present. No thyromegaly present. Cardiovascular: Normal rate, regular rhythm, normal heart sounds and intact distal pulses. Pulmonary/Chest: Effort normal and breath sounds normal. No respiratory distress. He has no wheezes. Musculoskeletal: He exhibits no edema or tenderness. Psychiatric: He has a normal mood and affect. His behavior is normal.       ASSESSMENT and PLAN  Diagnoses and all orders for this visit:    1. Essential hypertension, benign    Stable. On hydrochlorothiazide. Will check,  -     METABOLIC PANEL, COMPREHENSIVE    2. Mixed hyperlipidemia  On Lipitor, stable. 3. Prediabetes    Be on ADA diet. Will check,  -     HEMOGLOBIN A1C WITH EAG    4. Elevated LFTs  We will repeat,    -     METABOLIC PANEL, COMPREHENSIVE    5. Vitamin D deficiency  -     VITAMIN D, 25 HYDROXY    6. Screening for prostate cancer    Will order,  -     PSA, DIAGNOSTIC (PROSTATE SPECIFIC AG)        Follow-up Disposition: 4 months.   Return if symptoms worsen or fail to improve. Advised him to call back or return to office if symptoms worsen/change/persist.   Discussed expected course/resolution/complications of diagnosis in detail with patient. Medication risks/benefits/costs/interactions/alternatives discussed with patient. He was given an after visit summary which includes diagnoses, current medications, & vitals. He expressed understanding with the diagnosis and plan.

## 2018-10-31 NOTE — PROGRESS NOTES
Health Maintenance Due   Topic Date Due    Shingrix Vaccine Age 49> (1 of 2) 07/28/2012    Influenza Age 5 to Adult  08/01/2018       Chief Complaint   Patient presents with    Hypertension     Follow up    Sleep Apnea     R/t shift work       1. Have you been to the ER, urgent care clinic since your last visit? Hospitalized since your last visit? No    2. Have you seen or consulted any other health care providers outside of the [address] [place] [address] since your last visit? Include any pap smears or colon screening. No    3) Do you have an Advance Directive on file? no    4) Are you interested in receiving information on Advance Directives? NO      Patient is accompanied by self I have received verbal consent from Krupa Alonso. to discuss any/all medical information while they are present in the room. Flu shot refused when offered.

## 2018-11-01 LAB
25(OH)D3+25(OH)D2 SERPL-MCNC: 22.2 NG/ML (ref 30–100)
ALBUMIN SERPL-MCNC: 4.8 G/DL (ref 3.5–5.5)
ALBUMIN/GLOB SERPL: 2.2 {RATIO} (ref 1.2–2.2)
ALP SERPL-CCNC: 59 IU/L (ref 39–117)
ALT SERPL-CCNC: 43 IU/L (ref 0–44)
AST SERPL-CCNC: 35 IU/L (ref 0–40)
BILIRUB SERPL-MCNC: 0.5 MG/DL (ref 0–1.2)
BUN SERPL-MCNC: 16 MG/DL (ref 6–24)
BUN/CREAT SERPL: 17 (ref 9–20)
CALCIUM SERPL-MCNC: 9.4 MG/DL (ref 8.7–10.2)
CHLORIDE SERPL-SCNC: 102 MMOL/L (ref 96–106)
CO2 SERPL-SCNC: 25 MMOL/L (ref 20–29)
CREAT SERPL-MCNC: 0.96 MG/DL (ref 0.76–1.27)
EST. AVERAGE GLUCOSE BLD GHB EST-MCNC: 114 MG/DL
GLOBULIN SER CALC-MCNC: 2.2 G/DL (ref 1.5–4.5)
GLUCOSE SERPL-MCNC: 98 MG/DL (ref 65–99)
HBA1C MFR BLD: 5.6 % (ref 4.8–5.6)
POTASSIUM SERPL-SCNC: 4.4 MMOL/L (ref 3.5–5.2)
PROT SERPL-MCNC: 7 G/DL (ref 6–8.5)
PSA SERPL-MCNC: 1 NG/ML (ref 0–4)
SODIUM SERPL-SCNC: 144 MMOL/L (ref 134–144)

## 2018-11-16 ENCOUNTER — TELEPHONE (OUTPATIENT)
Dept: INTERNAL MEDICINE CLINIC | Age: 58
End: 2018-11-16

## 2018-11-16 NOTE — TELEPHONE ENCOUNTER
----- Message from Jack Mead sent at 11/16/2018  1:22 PM EST -----  Regarding: Dr. Rudi Christopher   Pt is requesting a letter for his employer stating the current state of his blood pressure and sleep issues. 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 Best contact number for the Pt is [phone].

## 2018-12-07 DIAGNOSIS — F51.01 PRIMARY INSOMNIA: ICD-10-CM

## 2018-12-10 ENCOUNTER — TELEPHONE (OUTPATIENT)
Dept: INTERNAL MEDICINE CLINIC | Age: 58
End: 2018-12-10

## 2018-12-10 RX ORDER — ZOLPIDEM TARTRATE 12.5 MG/1
TABLET, FILM COATED, EXTENDED RELEASE ORAL
Qty: 30 TAB | Refills: 0 | Status: SHIPPED | OUTPATIENT
Start: 2018-12-10 | End: 2019-02-21 | Stop reason: SDUPTHER

## 2018-12-10 NOTE — TELEPHONE ENCOUNTER
Contacted Pharmacy on file. Spoke with staff pharmacist on duty. Called in prescription for zolpidem CR ( Ambien CR) 12.5 mg tablet. With instructions to take 1 tablet by mouth everyday at bedtime. Disp: 30 tabs, 0 refills.     Eben Jerry LPN

## 2018-12-19 ENCOUNTER — OFFICE VISIT (OUTPATIENT)
Dept: INTERNAL MEDICINE CLINIC | Age: 58
End: 2018-12-19

## 2018-12-19 VITALS
BODY MASS INDEX: 33.09 KG/M2 | SYSTOLIC BLOOD PRESSURE: 160 MMHG | DIASTOLIC BLOOD PRESSURE: 100 MMHG | RESPIRATION RATE: 18 BRPM | TEMPERATURE: 95.8 F | WEIGHT: 223.4 LBS | HEART RATE: 71 BPM | OXYGEN SATURATION: 98 % | HEIGHT: 69 IN

## 2018-12-19 DIAGNOSIS — K21.9 GASTROESOPHAGEAL REFLUX DISEASE WITHOUT ESOPHAGITIS: ICD-10-CM

## 2018-12-19 DIAGNOSIS — I10 ESSENTIAL HYPERTENSION, BENIGN: Primary | ICD-10-CM

## 2018-12-19 DIAGNOSIS — R06.83 SNORING: ICD-10-CM

## 2018-12-19 DIAGNOSIS — R73.01 IMPAIRED FASTING GLUCOSE: ICD-10-CM

## 2018-12-19 DIAGNOSIS — E66.01 OBESITY, MORBID, BMI 40.0-49.9 (HCC): ICD-10-CM

## 2018-12-19 RX ORDER — LOSARTAN POTASSIUM AND HYDROCHLOROTHIAZIDE 12.5; 5 MG/1; MG/1
1 TABLET ORAL DAILY
Qty: 30 TAB | Refills: 3 | Status: SHIPPED | OUTPATIENT
Start: 2018-12-19 | End: 2019-02-04 | Stop reason: DRUGHIGH

## 2018-12-19 NOTE — PROGRESS NOTES
Health Maintenance Due   Topic Date Due    Shingrix Vaccine Age 49> (1 of 2) 07/28/2012    Influenza Age 5 to Adult  08/01/2018       Chief Complaint   Patient presents with    Fatigue     Hot flashes    Generalized Body Aches     Feel like going to be sick       1. Have you been to the ER, urgent care clinic since your last visit? Hospitalized since your last visit? No    2. Have you seen or consulted any other health care providers outside of the [address] [place] [address] since your last visit? Include any pap smears or colon screening. No    3) Do you have an Advance Directive on file? no    4) Are you interested in receiving information on Advance Directives? NO      Patient is accompanied by self I have received verbal consent from Lesly Patel to discuss any/all medical information while they are present in the room.

## 2018-12-19 NOTE — PROGRESS NOTES
HISTORY OF PRESENT ILLNESS  Supriya Bustamante is a 66 y.o. male here with the complaining of generalized malaise or fatigue and not feeling well for last several days. He is worried that he might be getting sick. No cough no sore throat no earache. No fever. Noticed to have some neck pain. Found to have elevated blood pressure. He is compliant with hydrochlorothiazide. He has a stressful job. no chest pain or palpitation or SOB. Reports snoring sometimes. His girlfriend in the past mentioned that he stops breathing sometimes at night. He is obese, maintaining weight but not able to lose weight. Was prediabetic in the past.  Last A1c is well. Watching carb. Refused flu shot. Hypertension    Associated symptoms include malaise/fatigue. Cholesterol Problem     Fatigue     Generalized Body Aches         Review of Systems   Constitutional: Positive for fatigue and malaise/fatigue. HENT: Negative. Eyes: Negative. Respiratory: Negative. Cardiovascular: Negative. Gastrointestinal: Negative. Genitourinary: Negative. Musculoskeletal: Negative. Skin: Negative. Neurological: Negative. Psychiatric/Behavioral: Negative. Physical Exam   Constitutional: He appears well-developed and well-nourished. No distress. HENT:   Head: Normocephalic and atraumatic. Right Ear: External ear normal.   Left Ear: External ear normal.   Nose: Nose normal.   Mouth/Throat: Oropharynx is clear and moist. No oropharyngeal exudate. Crowded airway. Neck: Normal range of motion. Neck supple. No JVD present. No thyromegaly present. Cardiovascular: Normal rate, regular rhythm, normal heart sounds and intact distal pulses. Pulmonary/Chest: Effort normal and breath sounds normal. No respiratory distress. He has no wheezes. Musculoskeletal: He exhibits no edema or tenderness. Psychiatric: He has a normal mood and affect.  His behavior is normal.   stressed       ASSESSMENT and PLAN  Diagnoses and all orders for this visit:    1. Essential hypertension, benign     My Recommendations  -Purchase a blood pressure cuff that goes around your upper arm and check blood pressure at least 3 times per week. Write down your numbers for review. Check blood pressure in the morning and evening. Rest for 5 minutes with feet elevated and arm resting on a table (at mid-chest level) when checking blood pressure  -Exercise 30-60 minutes most days of the week, preferably with a mix of cardiovascular and strength training. Exercise can improve blood pressure, even if you do not lose weight!  -Limit alcohol intake to less than 2-3 drinks daily   -Avoid tobacco products  -Avoid caffeine, energy drinks, stimulants  -DASH diet - includes fruits, vegetables, fiber, and less than 2000 mg sodium (salt) daily. Try to eat less than 3722-8974 calories daily. Limit fat intake.    -Avoid non-steroidal inflammatory medications (NSAIDS) such as ibuprofen, advil, motrin, aleve, and naproxyn as these may increase blood pressure if used long-term  -Avoid OTC decongestants such as pseudopherine, phenylephrine, Afrin  Will DC hydrochlorothiazide. Will start,  -     losartan-hydroCHLOROthiazide (HYZAAR) 50-12.5 mg per tablet; Take 1 Tab by mouth daily. D/C HCTZ  Monitor blood pressure twice a week. Follow-up in 2 months. 2. Impaired fasting glucose  A1c was normal last time. Be on low-carb diet. 3. Gastroesophageal reflux disease without esophagitis  On omeprazole as needed. 4. Snoring    Need to lose weight. Will refer,  -     SLEEP MEDICINE REFERRAL    5. Obesity, morbid, BMI 40.0-49.9 ([facility] [address]      1. Consume 3 meals per day, do not skip meals. 2. Chose low fat, portion controlled foods for snack and desserts such as low fat chocolate pudding, low fat flavored yogurt, 100 calorie snack packs, etc.   3. Increase moderate intensity exercise to 30 minutes at least 5 times per week.    4. Read nutrition facts labels to identify foods that are lean, extra lean and low fat  The patient is asked to make an attempt to improve diet and exercise patterns to aid in medical management of this problem. Follow-up Disposition: 4 months. Return if symptoms worsen or fail to improve. Advised him to call back or return to office if symptoms worsen/change/persist.   Discussed expected course/resolution/complications of diagnosis in detail with patient. Medication risks/benefits/costs/interactions/alternatives discussed with patient. He was given an after visit summary which includes diagnoses, current medications, & vitals. He expressed understanding with the diagnosis and plan.

## 2018-12-19 NOTE — PATIENT INSTRUCTIONS
DASH Diet: Care Instructions  Your Care Instructions    The DASH diet is an eating plan that can help lower your blood pressure. DASH stands for Dietary Approaches to Stop Hypertension. Hypertension is high blood pressure. The DASH diet focuses on eating foods that are high in calcium, potassium, and magnesium. These nutrients can lower blood pressure. The foods that are highest in these nutrients are fruits, vegetables, low-fat dairy products, nuts, seeds, and legumes. But taking calcium, potassium, and magnesium supplements instead of eating foods that are high in those nutrients does not have the same effect. The DASH diet also includes whole grains, fish, and poultry. The DASH diet is one of several lifestyle changes your doctor may recommend to lower your high blood pressure. Your doctor may also want you to decrease the amount of sodium in your diet. Lowering sodium while following the DASH diet can lower blood pressure even further than just the DASH diet alone. Follow-up care is a key part of your treatment and safety. Be sure to make and go to all appointments, and call your doctor if you are having problems. It's also a good idea to know your test results and keep a list of the medicines you take. How can you care for yourself at home? Following the DASH diet  · Eat 4 to 5 servings of fruit each day. A serving is 1 medium-sized piece of fruit, ½ cup chopped or canned fruit, 1/4 cup dried fruit, or 4 ounces (½ cup) of fruit juice. Choose fruit more often than fruit juice. · Eat 4 to 5 servings of vegetables each day. A serving is 1 cup of lettuce or raw leafy vegetables, ½ cup of chopped or cooked vegetables, or 4 ounces (½ cup) of vegetable juice. Choose vegetables more often than vegetable juice. · Get 2 to 3 servings of low-fat and fat-free dairy each day. A serving is 8 ounces of milk, 1 cup of yogurt, or 1 ½ ounces of cheese. · Eat 6 to 8 servings of grains each day.  A serving is 1 slice of bread, 1 ounce of dry cereal, or ½ cup of cooked rice, pasta, or cooked cereal. Try to choose whole-grain products as much as possible. · Limit lean meat, poultry, and fish to 2 servings each day. A serving is 3 ounces, about the size of a deck of cards. · Eat 4 to 5 servings of nuts, seeds, and legumes (cooked dried beans, lentils, and split peas) each week. A serving is 1/3 cup of nuts, 2 tablespoons of seeds, or ½ cup of cooked beans or peas. · Limit fats and oils to 2 to 3 servings each day. A serving is 1 teaspoon of vegetable oil or 2 tablespoons of salad dressing. · Limit sweets and added sugars to 5 servings or less a week. A serving is 1 tablespoon jelly or jam, ½ cup sorbet, or 1 cup of lemonade. · Eat less than 2,300 milligrams (mg) of sodium a day. If you limit your sodium to 1,500 mg a day, you can lower your blood pressure even more. Tips for success  · Start small. Do not try to make dramatic changes to your diet all at once. You might feel that you are missing out on your favorite foods and then be more likely to not follow the plan. Make small changes, and stick with them. Once those changes become habit, add a few more changes. · Try some of the following:  ? Make it a goal to eat a fruit or vegetable at every meal and at snacks. This will make it easy to get the recommended amount of fruits and vegetables each day. ? Try yogurt topped with fruit and nuts for a snack or healthy dessert. ? Add lettuce, tomato, cucumber, and onion to sandwiches. ? Combine a ready-made pizza crust with low-fat mozzarella cheese and lots of vegetable toppings. Try using tomatoes, squash, spinach, broccoli, carrots, cauliflower, and onions. ? Have a variety of cut-up vegetables with a low-fat dip as an appetizer instead of chips and dip. ? Sprinkle sunflower seeds or chopped almonds over salads. Or try adding chopped walnuts or almonds to cooked vegetables.   ? Try some vegetarian meals using beans and peas. Add garbanzo or kidney beans to salads. Make burritos and tacos with mashed sandoval beans or black beans. Where can you learn more? Go to http://chalo-tahir.info/. Enter B156 in the search box to learn more about \"DASH Diet: Care Instructions. \"  Current as of: December 6, 2017  Content Version: 11.8  © 1653-6603 [employer]. Care instructions adapted under license by [employer] (which disclaims liability or warranty for this information). If you have questions about a medical condition or this instruction, always ask your healthcare professional. [address] any warranty or liability for your use of this information.

## 2019-01-11 RX ORDER — ATORVASTATIN CALCIUM 20 MG/1
TABLET, FILM COATED ORAL
Qty: 30 TAB | Refills: 6 | Status: SHIPPED | OUTPATIENT
Start: 2019-01-11 | End: 2020-02-18

## 2019-02-04 ENCOUNTER — OFFICE VISIT (OUTPATIENT)
Dept: INTERNAL MEDICINE CLINIC | Age: 59
End: 2019-02-04

## 2019-02-04 VITALS
HEART RATE: 72 BPM | SYSTOLIC BLOOD PRESSURE: 155 MMHG | BODY MASS INDEX: 33.06 KG/M2 | DIASTOLIC BLOOD PRESSURE: 94 MMHG | HEIGHT: 69 IN | OXYGEN SATURATION: 97 % | TEMPERATURE: 97.2 F | RESPIRATION RATE: 14 BRPM | WEIGHT: 223.2 LBS

## 2019-02-04 DIAGNOSIS — G47.30 SLEEP APNEA, UNSPECIFIED TYPE: ICD-10-CM

## 2019-02-04 DIAGNOSIS — E55.9 VITAMIN D DEFICIENCY: ICD-10-CM

## 2019-02-04 DIAGNOSIS — R73.03 PREDIABETES: ICD-10-CM

## 2019-02-04 DIAGNOSIS — M62.838 NECK MUSCLE SPASM: ICD-10-CM

## 2019-02-04 DIAGNOSIS — I10 ESSENTIAL HYPERTENSION, BENIGN: Primary | ICD-10-CM

## 2019-02-04 DIAGNOSIS — E78.2 MIXED HYPERLIPIDEMIA: ICD-10-CM

## 2019-02-04 RX ORDER — LOSARTAN POTASSIUM AND HYDROCHLOROTHIAZIDE 12.5; 1 MG/1; MG/1
1 TABLET ORAL DAILY
Qty: 30 TAB | Refills: 3 | Status: SHIPPED | OUTPATIENT
Start: 2019-02-04 | End: 2019-02-20 | Stop reason: ALTCHOICE

## 2019-02-04 RX ORDER — BACLOFEN 10 MG/1
10 TABLET ORAL
Qty: 20 TAB | Refills: 0 | Status: SHIPPED | OUTPATIENT
Start: 2019-02-04 | End: 2019-05-07 | Stop reason: ALTCHOICE

## 2019-02-04 NOTE — PROGRESS NOTES
1. Have you been to the ER, urgent care clinic since your last visit? Hospitalized since your last visit? No 
 
2. Have you seen or consulted any other health care providers outside of the [address] [place] [address] since your last visit? Include any pap smears or colon screening. No  
 
Chief Complaint Patient presents with  Hypertension  
  follow up  Documentation  
  discuss a letter that Dr. Michael Pastor wrote to employer regarding insomnia, and htn.  Neck Pain  
  discuss neck pain Not fasting Shingrix: has not had it done. Flu vaccine: refused.

## 2019-02-04 NOTE — LETTER
NOTIFICATION RETURN TO WORK / SCHOOL 
 
2/4/2019 11:10 AM 
 
Mr. Nitesh Byers. [facility] [id number] To Whom It May Concern: 
 
Nitesh Perdomo is currently under the care of [address]. He has been suffering from uncontrolled blood pressure,sleep apnea and insomnia. He is advised not to to consecutive  night shift and also advised to do a night shift once in every 3 months if needed. If there are questions or concerns please have the patient contact our office. Sincerely, Mary Oliva MD

## 2019-02-04 NOTE — PROGRESS NOTES
HISTORY OF PRESENT ILLNESS Jaleesa Torres is a 66 y.o. male here to follow up. Report neck pain mostly on right side for last several days. No fall or injury. Neck feels stiff. He has HTN,on med. Stable blood pressure slightly elevated. Compliant with medicine. .no chest pain or palpitation or SOB. Has insomnia and sleep apnea. Using breathe right nasal strip. Helping him a lot. He did not see a sleep specialist yet. He is not able to the night shift which is causing him to have more blood pressure. Has prediabetes in past,A1C has been stable. Has elevated lipid, on statin. No myalgia. Need lab work. Hypertension Associated symptoms include neck pain. Cholesterol Problem Neck Pain Review of Systems Constitutional: Negative. HENT: Negative. Eyes: Negative. Respiratory: Negative. Cardiovascular: Negative. Gastrointestinal: Negative. Genitourinary: Negative. Musculoskeletal: Positive for neck pain. Skin: Negative. Neurological: Negative. Psychiatric/Behavioral: The patient has insomnia. Physical Exam  
Constitutional: He appears well-developed and well-nourished. No distress. Neck: Normal range of motion. No JVD present. No thyromegaly present. Cardiovascular: Normal rate, regular rhythm, normal heart sounds and intact distal pulses. Pulmonary/Chest: Effort normal and breath sounds normal. No respiratory distress. He has no wheezes. Musculoskeletal: He exhibits no edema or tenderness. Neck: Spine nontender. Paravertebral muscle spasm on right side. Range of motion mildly restricted Psychiatric: He has a normal mood and affect. His behavior is normal.  
 
 
ASSESSMENT and PLAN Diagnoses and all orders for this visit: 1. Essential hypertension, benign Blood pressure still slightly elevated. Will increase, 
-     losartan-hydroCHLOROthiazide (HYZAAR) 100-12.5 mg per tablet;  Take 1 Tab by mouth daily. D/C losartan-HCTZ 50-12.5 mg Be on DASH diet. Advised to monitor blood pressure at home. Average blood pressure should be 130/80. If abnormal, advised him to come back earlier. 
-     METABOLIC PANEL, BASIC 2. Prediabetes Eating healthier- a Mediterranean style diet with 55% or less of calories from carbohydrates has been shown to be very helpful for people with pre-diabetes. Try to eat more vegetables, whole fruit, nuts, whole grains, yogurt and less refined grains. Eat less red meat. Chicken and fish would be good protein sources. Aim to eat less than 45 grams of carbohydrates per meal. [email or web address] has a nice review. -     METABOLIC PANEL, BASIC 
-     HEMOGLOBIN A1C WITH EAG 3. Mixed hyperlipidemia 
 
 stable lipid. On statin. 4. Sleep apnea, unspecified type Using Breathe Right strips. Helping him a lot. He would like to wait and see before he sees sleep specialist. 
5. Vitamin D deficiency Will check, 
-     VITAMIN D, 25 HYDROXY 6. Neck muscle spasm Heating pad and massage. Will give, 
-     baclofen (LIORESAL) 10 mg tablet; Take 1 Tab by mouth two (2) times daily as needed. Follow-up Disposition: 4 months. Return if symptoms worsen or fail to improve. Advised him to call back or return to office if symptoms worsen/change/persist.  
Discussed expected course/resolution/complications of diagnosis in detail with patient. Medication risks/benefits/costs/interactions/alternatives discussed with patient. He was given an after visit summary which includes diagnoses, current medications, & vitals. He expressed understanding with the diagnosis and plan.

## 2019-02-04 NOTE — LETTER
NOTIFICATION RETURN TO WORK / SCHOOL 
 
2/4/2019 11:18 AM 
 
Mr. Dariel Pinto. [facility] [id number] To Whom It May Concern: 
 
Dariel Pinto. is currently under the care of [address]. He has been suffering from uncontrolled blood pressure,sleep apnea and insomnia. He is advised not to  do consecutive  night shifts and also advised to do a night shift once in every 3 months if needed. If there are questions or concerns please have the patient contact our office. Sincerely, Maryjane Stewart MD

## 2019-02-04 NOTE — PATIENT INSTRUCTIONS
DASH Diet: Care Instructions Your Care Instructions The DASH diet is an eating plan that can help lower your blood pressure. DASH stands for Dietary Approaches to Stop Hypertension. Hypertension is high blood pressure. The DASH diet focuses on eating foods that are high in calcium, potassium, and magnesium. These nutrients can lower blood pressure. The foods that are highest in these nutrients are fruits, vegetables, low-fat dairy products, nuts, seeds, and legumes. But taking calcium, potassium, and magnesium supplements instead of eating foods that are high in those nutrients does not have the same effect. The DASH diet also includes whole grains, fish, and poultry. The DASH diet is one of several lifestyle changes your doctor may recommend to lower your high blood pressure. Your doctor may also want you to decrease the amount of sodium in your diet. Lowering sodium while following the DASH diet can lower blood pressure even further than just the DASH diet alone. Follow-up care is a key part of your treatment and safety. Be sure to make and go to all appointments, and call your doctor if you are having problems. It's also a good idea to know your test results and keep a list of the medicines you take. How can you care for yourself at home? Following the DASH diet · Eat 4 to 5 servings of fruit each day. A serving is 1 medium-sized piece of fruit, ½ cup chopped or canned fruit, 1/4 cup dried fruit, or 4 ounces (½ cup) of fruit juice. Choose fruit more often than fruit juice. · Eat 4 to 5 servings of vegetables each day. A serving is 1 cup of lettuce or raw leafy vegetables, ½ cup of chopped or cooked vegetables, or 4 ounces (½ cup) of vegetable juice. Choose vegetables more often than vegetable juice. · Get 2 to 3 servings of low-fat and fat-free dairy each day. A serving is 8 ounces of milk, 1 cup of yogurt, or 1 ½ ounces of cheese. · Eat 6 to 8 servings of grains each day. A serving is 1 slice of bread, 1 ounce of dry cereal, or ½ cup of cooked rice, pasta, or cooked cereal. Try to choose whole-grain products as much as possible. · Limit lean meat, poultry, and fish to 2 servings each day. A serving is 3 ounces, about the size of a deck of cards. · Eat 4 to 5 servings of nuts, seeds, and legumes (cooked dried beans, lentils, and split peas) each week. A serving is 1/3 cup of nuts, 2 tablespoons of seeds, or ½ cup of cooked beans or peas. · Limit fats and oils to 2 to 3 servings each day. A serving is 1 teaspoon of vegetable oil or 2 tablespoons of salad dressing. · Limit sweets and added sugars to 5 servings or less a week. A serving is 1 tablespoon jelly or jam, ½ cup sorbet, or 1 cup of lemonade. · Eat less than 2,300 milligrams (mg) of sodium a day. If you limit your sodium to 1,500 mg a day, you can lower your blood pressure even more. Tips for success · Start small. Do not try to make dramatic changes to your diet all at once. You might feel that you are missing out on your favorite foods and then be more likely to not follow the plan. Make small changes, and stick with them. Once those changes become habit, add a few more changes. · Try some of the following: ? Make it a goal to eat a fruit or vegetable at every meal and at snacks. This will make it easy to get the recommended amount of fruits and vegetables each day. ? Try yogurt topped with fruit and nuts for a snack or healthy dessert. ? Add lettuce, tomato, cucumber, and onion to sandwiches. ? Combine a ready-made pizza crust with low-fat mozzarella cheese and lots of vegetable toppings. Try using tomatoes, squash, spinach, broccoli, carrots, cauliflower, and onions. ? Have a variety of cut-up vegetables with a low-fat dip as an appetizer instead of chips and dip. ? Sprinkle sunflower seeds or chopped almonds over salads.  Or try adding chopped walnuts or almonds to cooked vegetables. ? Try some vegetarian meals using beans and peas. Add garbanzo or kidney beans to salads. Make burritos and tacos with mashed sandoval beans or black beans. Where can you learn more? Go to http://chalo-tahir.info/. Enter T210 in the search box to learn more about \"DASH Diet: Care Instructions. \" Current as of: July 22, 2018 Content Version: 11.9 © 6633-5796 [employer]. Care instructions adapted under license by [employer] (which disclaims liability or warranty for this information). If you have questions about a medical condition or this instruction, always ask your healthcare professional. [address] any warranty or liability for your use of this information.

## 2019-02-19 ENCOUNTER — TELEPHONE (OUTPATIENT)
Dept: INTERNAL MEDICINE CLINIC | Age: 59
End: 2019-02-19

## 2019-02-20 NOTE — TELEPHONE ENCOUNTER
Per verbal order by Dr. Apurva Ruiz, read back for confirmation, called the pt and advised him to d/c the Hyzaar and to begin taking lisinopril/HCTZ 40mg-12.5mg once daily. Advised the pt to monitor his symptoms and his blood pressures and to call the office with any adverse reactions or blood pressures that are too high or too low. The pt voiced full understanding. The pt's medication was called into the [facility] in the pt's chart.

## 2019-02-20 NOTE — TELEPHONE ENCOUNTER
The pt returned call and stated that since starting on the new blood pressure medication Hyzaar 100-12.5mg he has noted pressure behind his eyes, dizziness, and a feeling of malaise. He has tried alternating doses with the Hyzaar 50-12.5mg as well, but still has the same symptoms. Today he did not take any medications, he feels better today. Wants to know if Dr. Jackie Wade can switch him to something else or has any other recommendations. Please advise.

## 2019-02-21 DIAGNOSIS — F51.01 PRIMARY INSOMNIA: ICD-10-CM

## 2019-02-21 NOTE — TELEPHONE ENCOUNTER
Pt called and advised that the phcy has discontinued the prescribed dosage on his losartan.  Please f/u

## 2019-02-21 NOTE — TELEPHONE ENCOUNTER
Called the pt and was advised that he was unable to  his new script because the pharmacy advised him that they do not make lisinopril-hctz 40-12.5mg combination medication, it would have to be two separate medications. Will confer with Dr. Carlos Mendoza.

## 2019-02-21 NOTE — TELEPHONE ENCOUNTER
Verbal order by Dr. Miguel Penaloza, read back for confirmation, called the [facility] and called in separate scripts for lisinopril 40mg 1 po every day # 30 with 2 refills and HCTZ 12.5mg 1 po every day #30 with 2 refills. Asked the pharmacy to call with any questions or concerns.

## 2019-02-25 RX ORDER — ZOLPIDEM TARTRATE 12.5 MG/1
TABLET, FILM COATED, EXTENDED RELEASE ORAL
Qty: 30 TAB | Refills: 0 | Status: SHIPPED | OUTPATIENT
Start: 2019-02-25 | End: 2019-05-06 | Stop reason: SDUPTHER

## 2019-03-08 DIAGNOSIS — Z79.899 ENCOUNTER FOR LONG-TERM (CURRENT) USE OF MEDICATIONS: ICD-10-CM

## 2019-03-11 RX ORDER — EPINEPHRINE 0.3 MG/.3ML
0.3 INJECTION SUBCUTANEOUS
Qty: 2 SYRINGE | Refills: 1 | Status: SHIPPED | OUTPATIENT
Start: 2019-03-11 | End: 2019-04-05 | Stop reason: SDUPTHER

## 2019-04-05 DIAGNOSIS — Z79.899 ENCOUNTER FOR LONG-TERM (CURRENT) USE OF MEDICATIONS: ICD-10-CM

## 2019-04-05 RX ORDER — EPINEPHRINE 0.3 MG/.3ML
0.3 INJECTION SUBCUTANEOUS
Qty: 2 SYRINGE | Refills: 1 | Status: SHIPPED | OUTPATIENT
Start: 2019-04-05 | End: 2019-04-05

## 2019-05-06 DIAGNOSIS — F51.01 PRIMARY INSOMNIA: ICD-10-CM

## 2019-05-07 ENCOUNTER — OFFICE VISIT (OUTPATIENT)
Dept: INTERNAL MEDICINE CLINIC | Age: 59
End: 2019-05-07

## 2019-05-07 VITALS
HEIGHT: 69 IN | DIASTOLIC BLOOD PRESSURE: 88 MMHG | WEIGHT: 221 LBS | OXYGEN SATURATION: 96 % | BODY MASS INDEX: 32.73 KG/M2 | HEART RATE: 64 BPM | RESPIRATION RATE: 18 BRPM | TEMPERATURE: 98.3 F | SYSTOLIC BLOOD PRESSURE: 142 MMHG

## 2019-05-07 DIAGNOSIS — M25.562 ACUTE PAIN OF LEFT KNEE: Primary | ICD-10-CM

## 2019-05-07 DIAGNOSIS — E78.2 MIXED HYPERLIPIDEMIA: ICD-10-CM

## 2019-05-07 DIAGNOSIS — I10 ESSENTIAL HYPERTENSION, BENIGN: ICD-10-CM

## 2019-05-07 DIAGNOSIS — E66.01 OBESITY, MORBID, BMI 40.0-49.9 (HCC): ICD-10-CM

## 2019-05-07 DIAGNOSIS — M17.0 PRIMARY OSTEOARTHRITIS OF BOTH KNEES: ICD-10-CM

## 2019-05-07 RX ORDER — ZOLPIDEM TARTRATE 12.5 MG/1
TABLET, FILM COATED, EXTENDED RELEASE ORAL
Qty: 30 TAB | Refills: 0 | Status: SHIPPED | OUTPATIENT
Start: 2019-05-07 | End: 2019-05-07 | Stop reason: SDUPTHER

## 2019-05-07 RX ORDER — NAPROXEN 500 MG/1
500 TABLET ORAL 2 TIMES DAILY WITH MEALS
Qty: 30 TAB | Refills: 0 | Status: SHIPPED | OUTPATIENT
Start: 2019-05-07 | End: 2019-06-26 | Stop reason: SDUPTHER

## 2019-05-07 NOTE — PROGRESS NOTES
[facility]  OFFICE PROCEDURE PROGRESS NOTE        Chart reviewed for the following:   Live Javier LPN, have reviewed the History, Physical and updated the Allergic reactions for [address] performed immediately prior to start of procedure:   Live Javier LPN, have performed the following reviews on Devin Plata. prior to the start of the procedure:            * Patient was identified by name and date of birth   * Agreement on procedure being performed was verified  * Risks and Benefits explained to the patient  * Procedure site verified and marked as necessary  * Patient was positioned for comfort  * Consent was signed and verified     Time: 1240      Date of procedure: 5/7/2019    Procedure performed by:  Flaco Bassett DO    Provider assisted by: Geoff Jimenez LPN    Patient assisted by: self    How tolerated by patient: tolerated the procedure well with no complications    Post Procedural Pain Scale: 6 - Hurts Even More    Comments: none

## 2019-05-07 NOTE — PATIENT INSTRUCTIONS
Joint Injections: Care Instructions Your Care Instructions Joint injections are shots into a joint, such as the knee. They may be used to put in medicines, such as pain relievers. A corticosteroid, or steroid, shot is used to reduce inflammation in tendons or joints. It is often used to treat problems such as arthritis, tendinitis, and bursitis. Steroids can be injected directly into a painful, inflamed joint. They can also help reduce inflammation of a bursa. A bursa is a sac of fluid. It cushions and lubricates areas where tendons, ligaments, skin, muscles, or bones rub against each other. A steroid shot can sometimes help with short-term pain relief when other treatments haven't worked. If steroid shots help, pain may improve for weeks or months. Follow-up care is a key part of your treatment and safety. Be sure to make and go to all appointments, and call your doctor if you are having problems. It's also a good idea to know your test results and keep a list of the medicines you take. How can you care for yourself at home? · Put ice or a cold pack on the area for 10 to 20 minutes at a time. Put a thin cloth between the ice and your skin. · Ask your doctor if you can take an over-the-counter pain medicine, such as acetaminophen (Tylenol), ibuprofen (Advil, Motrin), or naproxen (Aleve). Be safe with medicines. Read and follow all instructions on the label. · Avoid strenuous activities for several days. In particular, avoid ones that put stress on the area where you got the shot. · If you have dressings over the area, keep them clean and dry. You may remove them when your doctor tells you to. When should you call for help? Call your doctor now or seek immediate medical care if: 
  · You have signs of infection, such as: 
? Increased pain, swelling, warmth, or redness. ? Red streaks leading from the site. ? Pus draining from the site. ? A fever.  Watch closely for changes in your health, and be sure to contact your doctor if you have any problems. Where can you learn more? Go to http://chalo-tahir.info/. Enter N616 in the search box to learn more about \"Joint Injections: Care Instructions. \" Current as of: September 20, 2018 Content Version: 11.9 © 3359-8392 [employer]. Care instructions adapted under license by [employer] (which disclaims liability or warranty for this information). If you have questions about a medical condition or this instruction, always ask your healthcare professional. [address] any warranty or liability for your use of this information.

## 2019-05-07 NOTE — PROGRESS NOTES
Health Maintenance Due   Topic Date Due    Shingrix Vaccine Age 49> (1 of 2) 07/28/2012       Chief Complaint   Patient presents with    Knee Pain     left       1. Have you been to the ER, urgent care clinic since your last visit? Hospitalized since your last visit? No    2. Have you seen or consulted any other health care providers outside of the [address] [place] [address] since your last visit? Include any pap smears or colon screening. No    3) Do you have an Advance Directive on file? no    4) Are you interested in receiving information on Advance Directives? NO      Patient is accompanied by self I have received verbal consent from Yolanda Lin. to discuss any/all medical information while they are present in the room.

## 2019-05-30 NOTE — PROGRESS NOTES
HISTORY OF PRESENT ILLNESS  Renee Cline is a 66 y.o. male. Pt. comes in for f/u. Has multiple medical problems. Reports bilateral arthritic knee pain. Left side has been worse recently. No obvious trauma. Medications help some. Reports compliance with medications and diet. Med list and most recent labs/studies reviewed with pt. He is obese. Trying to be active physically to control weight. Reports no other new c/o. HPI    Review of Systems   Constitutional: Negative. HENT: Negative. Eyes: Negative. Respiratory: Negative. Cardiovascular: Negative. Gastrointestinal: Positive for heartburn. Negative for abdominal pain. Genitourinary: Negative. Musculoskeletal: Positive for joint pain. Skin: Negative. Neurological: Negative. Psychiatric/Behavioral: The patient has insomnia. Physical Exam   Constitutional: He is oriented to person, place, and time. He appears well-developed and well-nourished. No distress. Obese man   HENT:   Head: Normocephalic and atraumatic. Eyes: Conjunctivae are normal.   Neck: Normal range of motion. Neck supple. No thyromegaly present. Cardiovascular: Normal rate, regular rhythm, normal heart sounds and intact distal pulses. No murmur heard. Pulmonary/Chest: Effort normal and breath sounds normal. No respiratory distress. Abdominal: Soft. Bowel sounds are normal. He exhibits no distension. Musculoskeletal: He exhibits tenderness (Bilateral knees). He exhibits no edema. DJD   Neurological: He is alert and oriented to person, place, and time. Coordination normal.   Skin: Skin is warm and dry. No rash noted. Psychiatric: He has a normal mood and affect. His behavior is normal.   Nursing note and vitals reviewed. ASSESSMENT and PLAN  Diagnoses and all orders for this visit:    1. Acute pain of left knee  -     DRAIN/INJECT LARGE JOINT/BURSA  -     METHYLPREDNISOLONE ACETATE INJECTION 40 MG    2.  Essential hypertension, benign    3. Obesity, morbid, BMI 40.0-49.9 ([address]    4. Mixed hyperlipidemia    5. Primary osteoarthritis of both knees  -     DRAIN/INJECT LARGE JOINT/BURSA  -     METHYLPREDNISOLONE ACETATE INJECTION 40 MG    Other orders  -     naproxen (NAPROSYN) 500 mg tablet; Take 1 Tab by mouth two (2) times daily (with meals) for 15 days. Follow-up and Dispositions    · Return if symptoms worsen or fail to improve.     lab results and schedule of future lab studies reviewed with patient  reviewed diet, exercise and weight control  reviewed medications and side effects in detail  Left knee was injected in sterile fashion with Depomedrol 40 mg and Lidocaine 1% 1.5cc without any complications.   Advised patient to apply ice to the area at home

## 2019-06-05 ENCOUNTER — OFFICE VISIT (OUTPATIENT)
Dept: INTERNAL MEDICINE CLINIC | Age: 59
End: 2019-06-05

## 2019-06-05 VITALS
BODY MASS INDEX: 32.53 KG/M2 | DIASTOLIC BLOOD PRESSURE: 98 MMHG | OXYGEN SATURATION: 97 % | HEIGHT: 69 IN | HEART RATE: 76 BPM | TEMPERATURE: 98.1 F | WEIGHT: 219.6 LBS | RESPIRATION RATE: 18 BRPM | SYSTOLIC BLOOD PRESSURE: 160 MMHG

## 2019-06-05 DIAGNOSIS — E78.2 MIXED HYPERLIPIDEMIA: ICD-10-CM

## 2019-06-05 DIAGNOSIS — G47.00 INSOMNIA, UNSPECIFIED TYPE: ICD-10-CM

## 2019-06-05 DIAGNOSIS — J30.1 ALLERGIC RHINITIS DUE TO POLLEN, UNSPECIFIED SEASONALITY: ICD-10-CM

## 2019-06-05 DIAGNOSIS — I10 ESSENTIAL HYPERTENSION, BENIGN: Primary | ICD-10-CM

## 2019-06-05 DIAGNOSIS — R73.01 IMPAIRED FASTING GLUCOSE: ICD-10-CM

## 2019-06-05 DIAGNOSIS — E55.9 VITAMIN D DEFICIENCY: ICD-10-CM

## 2019-06-05 RX ORDER — PREDNISONE 5 MG/1
5 TABLET ORAL 2 TIMES DAILY
Qty: 14 TAB | Refills: 0 | Status: SHIPPED | OUTPATIENT
Start: 2019-06-05 | End: 2019-12-19 | Stop reason: ALTCHOICE

## 2019-06-05 NOTE — PROGRESS NOTES
HISTORY OF PRESENT ILLNESS  Opal Keith is a 66 y.o. male here to follow up. Noticed to have elevated blood pressure today. He had couple of alcoholic drinks last night. Compliant with blood pressure medicine. Report nasal congestion and postnasal drip. No cough or wheezing. He has HTN,on med. Stable blood pressure slightly elevated. Compliant with medicine. .no chest pain or palpitation or SOB. Has insomnia and sleep apnea. Using breathe right nasal strip. His Ambien seldom. He is not able to the night shift which is causing him to have more blood pressure. Has prediabetes in past,A1C has been stable. Has elevated lipid, on statin. No myalgia. Need lab work. Hypertension      Cholesterol Problem     GERD         Review of Systems   Constitutional: Negative. HENT: Positive for congestion. Eyes: Negative. Respiratory: Negative. Cardiovascular: Negative. Gastrointestinal: Negative. Genitourinary: Negative. Skin: Negative. Neurological: Negative. Psychiatric/Behavioral: The patient has insomnia. Physical Exam   Constitutional: He appears well-developed and well-nourished. No distress. HENT:   Head: Normocephalic and atraumatic. Right Ear: External ear normal.   Left Ear: External ear normal.   Mouth/Throat: Oropharynx is clear and moist. No oropharyngeal exudate. Nasal turbinates:red inflamed,NT    Cobble stoning present. Neck: Normal range of motion. No JVD present. No thyromegaly present. Cardiovascular: Normal rate, regular rhythm, normal heart sounds and intact distal pulses. Pulmonary/Chest: Effort normal and breath sounds normal. No respiratory distress. He has no wheezes. Musculoskeletal: He exhibits no edema or tenderness. Neck: Spine nontender. Paravertebral muscle spasm on right side. Range of motion mildly restricted   Psychiatric: He has a normal mood and affect.  His behavior is normal.       ASSESSMENT and PLAN    Diagnoses and all orders for this visit:    1. Essential hypertension, benign  Elevated blood pressure. Patient had to alcohol drink last night. Probably elevated blood pressure because of alcohol. He is compliant with the pain is diet, doing well. Advised to be on DASH diet. Will check,    -     METABOLIC PANEL, COMPREHENSIVE    2. Mixed hyperlipidemia    On Lipitor work. Will repeat,  -     LIPID PANEL  -     METABOLIC PANEL, COMPREHENSIVE  Need to fill out form. 3. Impaired fasting glucose    Be on low-carb diet. Will check,  -     HEMOGLOBIN A1C WITH EAG    4. Insomnia, unspecified type  Using Ambien seldom. 5. Vitamin D deficiency    Will check,  -     VITAMIN D, 25 HYDROXY    6. Allergic rhinitis due to pollen, unspecified seasonality      Using allergy medicine. Will add,  -     predniSONE (DELTASONE) 5 mg tablet; Take 1 Tab by mouth two (2) times a day. Follow-up Disposition: 4 months. Return if symptoms worsen or fail to improve. Advised him to call back or return to office if symptoms worsen/change/persist.   Discussed expected course/resolution/complications of diagnosis in detail with patient. Medication risks/benefits/costs/interactions/alternatives discussed with patient. He was given an after visit summary which includes diagnoses, current medications, & vitals. He expressed understanding with the diagnosis and plan.

## 2019-06-05 NOTE — PROGRESS NOTES
Renee Cline is a 66 y.o. male    Chief Complaint   Patient presents with    Hypertension    Cholesterol Problem    GERD    Knee Pain     1. Have you been to the ER, urgent care clinic since your last visit? Hospitalized since your last visit? No       2. Have you seen or consulted any other health care providers outside of the [address] [place] [address] since your last visit? Include any pap smears or colon screening.   No        Visit Vitals  BP (!) 162/102 (BP 1 Location: Right arm, BP Patient Position: Sitting)   Pulse 76   Temp 98.1 °F (36.7 °C) (Oral)   Resp 18   Ht 5' 9\" (1.753 m)   Wt 219 lb 9.6 oz (99.6 kg)   SpO2 97%   BMI 32.43 kg/m²

## 2019-06-05 NOTE — PATIENT INSTRUCTIONS
DASH Diet: Care Instructions  Your Care Instructions    The DASH diet is an eating plan that can help lower your blood pressure. DASH stands for Dietary Approaches to Stop Hypertension. Hypertension is high blood pressure. The DASH diet focuses on eating foods that are high in calcium, potassium, and magnesium. These nutrients can lower blood pressure. The foods that are highest in these nutrients are fruits, vegetables, low-fat dairy products, nuts, seeds, and legumes. But taking calcium, potassium, and magnesium supplements instead of eating foods that are high in those nutrients does not have the same effect. The DASH diet also includes whole grains, fish, and poultry. The DASH diet is one of several lifestyle changes your doctor may recommend to lower your high blood pressure. Your doctor may also want you to decrease the amount of sodium in your diet. Lowering sodium while following the DASH diet can lower blood pressure even further than just the DASH diet alone. Follow-up care is a key part of your treatment and safety. Be sure to make and go to all appointments, and call your doctor if you are having problems. It's also a good idea to know your test results and keep a list of the medicines you take. How can you care for yourself at home? Following the DASH diet  · Eat 4 to 5 servings of fruit each day. A serving is 1 medium-sized piece of fruit, ½ cup chopped or canned fruit, 1/4 cup dried fruit, or 4 ounces (½ cup) of fruit juice. Choose fruit more often than fruit juice. · Eat 4 to 5 servings of vegetables each day. A serving is 1 cup of lettuce or raw leafy vegetables, ½ cup of chopped or cooked vegetables, or 4 ounces (½ cup) of vegetable juice. Choose vegetables more often than vegetable juice. · Get 2 to 3 servings of low-fat and fat-free dairy each day. A serving is 8 ounces of milk, 1 cup of yogurt, or 1 ½ ounces of cheese. · Eat 6 to 8 servings of grains each day.  A serving is 1 slice of bread, 1 ounce of dry cereal, or ½ cup of cooked rice, pasta, or cooked cereal. Try to choose whole-grain products as much as possible. · Limit lean meat, poultry, and fish to 2 servings each day. A serving is 3 ounces, about the size of a deck of cards. · Eat 4 to 5 servings of nuts, seeds, and legumes (cooked dried beans, lentils, and split peas) each week. A serving is 1/3 cup of nuts, 2 tablespoons of seeds, or ½ cup of cooked beans or peas. · Limit fats and oils to 2 to 3 servings each day. A serving is 1 teaspoon of vegetable oil or 2 tablespoons of salad dressing. · Limit sweets and added sugars to 5 servings or less a week. A serving is 1 tablespoon jelly or jam, ½ cup sorbet, or 1 cup of lemonade. · Eat less than 2,300 milligrams (mg) of sodium a day. If you limit your sodium to 1,500 mg a day, you can lower your blood pressure even more. Tips for success  · Start small. Do not try to make dramatic changes to your diet all at once. You might feel that you are missing out on your favorite foods and then be more likely to not follow the plan. Make small changes, and stick with them. Once those changes become habit, add a few more changes. · Try some of the following:  ? Make it a goal to eat a fruit or vegetable at every meal and at snacks. This will make it easy to get the recommended amount of fruits and vegetables each day. ? Try yogurt topped with fruit and nuts for a snack or healthy dessert. ? Add lettuce, tomato, cucumber, and onion to sandwiches. ? Combine a ready-made pizza crust with low-fat mozzarella cheese and lots of vegetable toppings. Try using tomatoes, squash, spinach, broccoli, carrots, cauliflower, and onions. ? Have a variety of cut-up vegetables with a low-fat dip as an appetizer instead of chips and dip. ? Sprinkle sunflower seeds or chopped almonds over salads. Or try adding chopped walnuts or almonds to cooked vegetables.   ? Try some vegetarian meals using beans and peas. Add garbanzo or kidney beans to salads. Make burritos and tacos with mashed sandoval beans or black beans. Where can you learn more? Go to http://chalo-tahir.info/. Enter I991 in the search box to learn more about \"DASH Diet: Care Instructions. \"  Current as of: July 22, 2018  Content Version: 11.9  © 0304-7577 [employer]. Care instructions adapted under license by [employer] (which disclaims liability or warranty for this information). If you have questions about a medical condition or this instruction, always ask your healthcare professional. [address] any warranty or liability for your use of this information.

## 2019-06-18 ENCOUNTER — TELEPHONE (OUTPATIENT)
Dept: INTERNAL MEDICINE CLINIC | Age: 59
End: 2019-06-18

## 2019-06-18 LAB
25(OH)D3+25(OH)D2 SERPL-MCNC: 23.8 NG/ML (ref 30–100)
ALBUMIN SERPL-MCNC: 4.5 G/DL (ref 3.5–5.5)
ALBUMIN/GLOB SERPL: 2 {RATIO} (ref 1.2–2.2)
ALP SERPL-CCNC: 52 IU/L (ref 39–117)
ALT SERPL-CCNC: 27 IU/L (ref 0–44)
AST SERPL-CCNC: 21 IU/L (ref 0–40)
BILIRUB SERPL-MCNC: 0.4 MG/DL (ref 0–1.2)
BUN SERPL-MCNC: 13 MG/DL (ref 6–24)
BUN/CREAT SERPL: 16 (ref 9–20)
CALCIUM SERPL-MCNC: 9.1 MG/DL (ref 8.7–10.2)
CHLORIDE SERPL-SCNC: 102 MMOL/L (ref 96–106)
CHOLEST SERPL-MCNC: 164 MG/DL (ref 100–199)
CO2 SERPL-SCNC: 26 MMOL/L (ref 20–29)
CREAT SERPL-MCNC: 0.83 MG/DL (ref 0.76–1.27)
EST. AVERAGE GLUCOSE BLD GHB EST-MCNC: 108 MG/DL
GLOBULIN SER CALC-MCNC: 2.3 G/DL (ref 1.5–4.5)
GLUCOSE SERPL-MCNC: 83 MG/DL (ref 65–99)
HBA1C MFR BLD: 5.4 % (ref 4.8–5.6)
HDLC SERPL-MCNC: 45 MG/DL
INTERPRETATION, 910389: NORMAL
LDLC SERPL CALC-MCNC: 81 MG/DL (ref 0–99)
POTASSIUM SERPL-SCNC: 4.6 MMOL/L (ref 3.5–5.2)
PROT SERPL-MCNC: 6.8 G/DL (ref 6–8.5)
SODIUM SERPL-SCNC: 140 MMOL/L (ref 134–144)
TRIGL SERPL-MCNC: 188 MG/DL (ref 0–149)
VLDLC SERPL CALC-MCNC: 38 MG/DL (ref 5–40)

## 2019-06-18 NOTE — PROGRESS NOTES
Low vitamin D, advised to take vitamin D3 2000 units once a day for 4 months. Slightly elevated triglyceride, need to watch carbohydrate. All other labs are stable.

## 2019-06-18 NOTE — TELEPHONE ENCOUNTER
Pt called and asked that when the  Is done filling out paperwork that he dropped off that it be mailed to him.

## 2019-06-27 RX ORDER — NAPROXEN 500 MG/1
TABLET ORAL
Qty: 30 TAB | Refills: 0 | Status: SHIPPED | OUTPATIENT
Start: 2019-06-27 | End: 2019-10-03 | Stop reason: SDUPTHER

## 2019-07-11 ENCOUNTER — HOSPITAL ENCOUNTER (OUTPATIENT)
Dept: GENERAL RADIOLOGY | Age: 59
Discharge: HOME | End: 2019-07-11
Payer: COMMERCIAL

## 2019-07-11 ENCOUNTER — OFFICE VISIT (OUTPATIENT)
Dept: FAMILY MEDICINE CLINIC | Age: 59
End: 2019-07-11

## 2019-07-11 ENCOUNTER — TELEPHONE (OUTPATIENT)
Dept: FAMILY MEDICINE CLINIC | Age: 59
End: 2019-07-11

## 2019-07-11 VITALS
OXYGEN SATURATION: 97 % | HEART RATE: 65 BPM | BODY MASS INDEX: 31.84 KG/M2 | HEIGHT: 69 IN | RESPIRATION RATE: 17 BRPM | WEIGHT: 215 LBS | DIASTOLIC BLOOD PRESSURE: 87 MMHG | TEMPERATURE: 97.9 F | SYSTOLIC BLOOD PRESSURE: 134 MMHG

## 2019-07-11 DIAGNOSIS — R05.9 COUGH: Primary | ICD-10-CM

## 2019-07-11 DIAGNOSIS — R05.9 COUGH: ICD-10-CM

## 2019-07-11 DIAGNOSIS — H61.21 IMPACTED CERUMEN OF RIGHT EAR: ICD-10-CM

## 2019-07-11 PROCEDURE — 71046 X-RAY EXAM CHEST 2 VIEWS: CPT

## 2019-07-11 RX ORDER — LISINOPRIL 40 MG/1
TABLET ORAL
Refills: 2 | COMMUNITY
Start: 2019-07-07 | End: 2019-08-03 | Stop reason: SDUPTHER

## 2019-07-11 RX ORDER — HYDROCHLOROTHIAZIDE 12.5 MG/1
TABLET ORAL
Refills: 2 | COMMUNITY
Start: 2019-07-07 | End: 2019-08-03 | Stop reason: SDUPTHER

## 2019-07-11 NOTE — PROGRESS NOTES
HISTORY OF PRESENT ILLNESS  Pasha Renteria is a 66 y.o. male. Patient reports dry cough x 4 months with post-nasal drip. He has been taking robitussin and sudafed intermittently with no relief. Minimal sinus congestion reported. No fever. He took a 7 day course of prednisone, which helped, but symptoms returned. No shortness of breath or wheezing. Patient has history of allergies, but is not currently taking anything for them. Patient has history of cerumen impaction and has been using peroxide with no relief on right ear fullness. Patient did smoke cigarettes in his teen years and still sometimes smokes cigars. Visit Vitals  /87   Pulse 65   Temp 97.9 °F (36.6 °C) (Oral)   Resp 17   Ht 5' 9\" (1.753 m)   Wt 215 lb (97.5 kg)   SpO2 97%   BMI 31.75 kg/m²       HPI    Review of Systems   Respiratory: Positive for cough. Physical Exam   Constitutional: He is oriented to person, place, and time. He appears well-developed and well-nourished. HENT:   Head: Normocephalic. Nose: Mucosal edema and rhinorrhea present. Mouth/Throat: Uvula is midline, oropharynx is clear and moist and mucous membranes are normal.   Right ear occluded by cerumen; moderate cerumen to left ear but TM visualized and normal   Neck: Normal range of motion. Neck supple. Cardiovascular: Normal rate, regular rhythm and normal heart sounds. Pulmonary/Chest: Effort normal and breath sounds normal.   Lymphadenopathy:     He has no cervical adenopathy. Neurological: He is alert and oriented to person, place, and time. Skin: Skin is warm and dry. Psychiatric: He has a normal mood and affect. ASSESSMENT and PLAN    ICD-10-CM ICD-9-CM    1. Cough R05 786.2 XR CHEST PA LAT   2.  Impacted cerumen of right ear H61.21 380.4      Orders Placed This Encounter    XR CHEST PA LAT    hydroCHLOROthiazide (HYDRODIURIL) 12.5 mg tablet    lisinopril (PRINIVIL, ZESTRIL) 40 mg tablet   stop sudafed and robitussin  Start zyrtec and flonase  CXR ordered due to length of symptoms  Start debrox for cerumen impaction; follow-up in 2-3 weeks if no improvement to have them flushed

## 2019-07-11 NOTE — PROGRESS NOTES
[facility]. is a 66 y.o. male    Room 2    Chief Complaint   Patient presents with    Cough     \"scratchy throat\" dry cough, not productive     1. Have you been to the ER, urgent care clinic since your last visit? Hospitalized since your last visit? no    2. Have you seen or consulted any other health care providers outside of the [address] [place] [address] since your last visit? Include any pap smears or colon screening.  No    Visit Vitals  /87   Pulse 65   Temp 97.9 °F (36.6 °C) (Oral)   Resp 17   Ht 5' 9\" (1.753 m)   Wt 215 lb (97.5 kg)   SpO2 97%   BMI 31.75 kg/m²

## 2019-07-12 ENCOUNTER — TELEPHONE (OUTPATIENT)
Dept: FAMILY MEDICINE CLINIC | Age: 59
End: 2019-07-12

## 2019-07-12 NOTE — TELEPHONE ENCOUNTER
Pt returned call and expressed that he understood his message that CXR is normal and wanted NP Thrift to know that he is feeling better.

## 2019-07-22 ENCOUNTER — TELEPHONE (OUTPATIENT)
Dept: INTERNAL MEDICINE CLINIC | Age: 59
End: 2019-07-22

## 2019-07-22 DIAGNOSIS — F51.01 PRIMARY INSOMNIA: ICD-10-CM

## 2019-07-22 RX ORDER — ZOLPIDEM TARTRATE 12.5 MG/1
12.5 TABLET, FILM COATED, EXTENDED RELEASE ORAL
Qty: 30 TAB | Refills: 0 | Status: SHIPPED | OUTPATIENT
Start: 2019-07-22 | End: 2019-09-15 | Stop reason: SDUPTHER

## 2019-07-22 NOTE — TELEPHONE ENCOUNTER
----- Message from Seda Wiley sent at 7/19/2019  5:41 PM EDT -----  Regarding: Dr. Mikie Baumgarten (if not patient):  pt    Relationship of caller (if not patient):  pt    Best contact number(s):  [phone]    Name of medication and dosage if known:  \"Ambien\"    Is patient out of this medication (yes/no):  yes    Pharmacy name:  [address] [place] [address] listed in chart? (yes/no):  yes  Pharmacy phone number:  [phone]    Details to clarify the request:  Pt requesting the refill through pharmacy. Pharmacy failed to notify pt to contact office until when he return from out of town.      Seda Wiley

## 2019-07-22 NOTE — TELEPHONE ENCOUNTER
Reached out to pt, to schedule appt. As requested. Per pt he does not understand why, when he was here in June? Would like to speak with Dr. Kathleen Sam or nurse to clarify why he needs to be seen?

## 2019-07-23 ENCOUNTER — TELEPHONE (OUTPATIENT)
Dept: FAMILY MEDICINE CLINIC | Age: 59
End: 2019-07-23

## 2019-07-23 NOTE — TELEPHONE ENCOUNTER
Patient is having trouble getting through to his PCP office regarding a prescription his PCP wrote. Is wondering if this office can reach out to his PCP since he has recently been seen here.

## 2019-07-25 ENCOUNTER — OFFICE VISIT (OUTPATIENT)
Dept: FAMILY MEDICINE CLINIC | Age: 59
End: 2019-07-25

## 2019-07-25 VITALS
WEIGHT: 214 LBS | BODY MASS INDEX: 31.7 KG/M2 | RESPIRATION RATE: 18 BRPM | TEMPERATURE: 99.5 F | OXYGEN SATURATION: 99 % | SYSTOLIC BLOOD PRESSURE: 134 MMHG | HEIGHT: 69 IN | DIASTOLIC BLOOD PRESSURE: 78 MMHG | HEART RATE: 74 BPM

## 2019-07-25 DIAGNOSIS — J01.90 ACUTE NON-RECURRENT SINUSITIS, UNSPECIFIED LOCATION: Primary | ICD-10-CM

## 2019-07-25 DIAGNOSIS — H61.23 BILATERAL IMPACTED CERUMEN: ICD-10-CM

## 2019-07-25 DIAGNOSIS — R05.9 COUGH: ICD-10-CM

## 2019-07-25 RX ORDER — AZITHROMYCIN 250 MG/1
250 TABLET, FILM COATED ORAL SEE ADMIN INSTRUCTIONS
Qty: 6 TAB | Refills: 0 | Status: SHIPPED | OUTPATIENT
Start: 2019-07-25 | End: 2019-07-30

## 2019-07-25 NOTE — PROGRESS NOTES
HISTORY OF PRESENT ILLNESS  Ildefonso Raygoza is a 66 y.o. male. Patient reports congestion and dry cough for over 2 weeks. Body aches, fatigue, and fever developed in the past 2 days. He has taken Motrin today with little relief. He has been taking zyrtec and flonase, which helped initially, but symptoms have gotten worse again. Nose feels congested, but not runny. Visit Vitals  /78 (BP 1 Location: Left arm, BP Patient Position: Sitting)   Pulse 74   Temp 99.5 °F (37.5 °C) (Oral)   Resp 18   Ht 5' 9\" (1.753 m)   Wt 214 lb (97.1 kg)   SpO2 99%   BMI 31.60 kg/m²       HPI    Review of Systems   Constitutional: Positive for fever and malaise/fatigue. HENT: Positive for congestion. Respiratory: Positive for cough. Musculoskeletal: Positive for myalgias. Physical Exam   Constitutional: He is oriented to person, place, and time. He appears well-developed. HENT:   Head: Normocephalic. Right Ear: Hearing, external ear and ear canal normal.   Left Ear: Hearing, external ear and ear canal normal.   Nose: Mucosal edema and rhinorrhea (thick yellow  mucus posterior nasal passage) present. Right sinus exhibits no maxillary sinus tenderness and no frontal sinus tenderness. Left sinus exhibits no maxillary sinus tenderness and no frontal sinus tenderness. Mouth/Throat: Uvula is midline, oropharynx is clear and moist and mucous membranes are normal.   Both TM occluded by cerumen; removed easily with lighted curette; tolerated well; TMs noted to have clear to cloudy fluid behind both TMs post-procedure, no erythema or bulging   Neck: Normal range of motion. Neck supple. Cardiovascular: Normal rate, regular rhythm and normal heart sounds. Pulmonary/Chest: Effort normal and breath sounds normal.   Lymphadenopathy:     He has cervical adenopathy (left). Neurological: He is alert and oriented to person, place, and time. Skin: Skin is warm and dry. Psychiatric: He has a normal mood and affect. ASSESSMENT and PLAN    ICD-10-CM ICD-9-CM    1. Acute non-recurrent sinusitis, unspecified location J01.90 461.9    2. Cough R05 786.2    3.  Bilateral impacted cerumen H61.23 380.4      Orders Placed This Encounter    azithromycin (ZITHROMAX) 250 mg tablet   continue zyrtec and flonase  Follow-up if no improvement in the next week

## 2019-08-05 RX ORDER — LISINOPRIL 40 MG/1
TABLET ORAL
Qty: 30 TAB | Refills: 2 | Status: SHIPPED | OUTPATIENT
Start: 2019-08-05 | End: 2019-08-12 | Stop reason: SINTOL

## 2019-08-05 RX ORDER — HYDROCHLOROTHIAZIDE 12.5 MG/1
TABLET ORAL
Qty: 30 TAB | Refills: 2 | Status: SHIPPED | OUTPATIENT
Start: 2019-08-05 | End: 2019-12-08 | Stop reason: SDUPTHER

## 2019-08-11 DIAGNOSIS — K21.9 GASTROESOPHAGEAL REFLUX DISEASE WITHOUT ESOPHAGITIS: ICD-10-CM

## 2019-08-12 ENCOUNTER — TELEPHONE (OUTPATIENT)
Dept: INTERNAL MEDICINE CLINIC | Age: 59
End: 2019-08-12

## 2019-08-12 RX ORDER — LOSARTAN POTASSIUM 50 MG/1
50 TABLET ORAL DAILY
Qty: 30 TAB | Refills: 5 | Status: SHIPPED | OUTPATIENT
Start: 2019-08-12 | End: 2019-10-07 | Stop reason: ALTCHOICE

## 2019-08-12 RX ORDER — OMEPRAZOLE 40 MG/1
CAPSULE, DELAYED RELEASE ORAL
Qty: 30 CAP | Refills: 2 | Status: SHIPPED | OUTPATIENT
Start: 2019-08-12 | End: 2020-11-23 | Stop reason: SDUPTHER

## 2019-08-12 NOTE — TELEPHONE ENCOUNTER
Pt coughing real bad since he switched to lisinopril- he stopped taking this on 8/9/19  States he has gotten a little better since he stopped  He would like to be switched to a new medication

## 2019-08-12 NOTE — TELEPHONE ENCOUNTER
Per verbal order by Dr. Dina Mon, read back for confirmation, called the pt and advised him to d/c his lisinopril and that Dr. Dina Mon is prescribing losartan 50 mg once daily. Advised him to call with any questions, concerns, or side effects. The pt voiced thanks and understanding.

## 2019-08-12 NOTE — TELEPHONE ENCOUNTER
Two pt identifiers confirmed. Pt was concerned about lisinopril in regards to coughing symptom. Informed pt that coughing is a normal sign/symptom with this medication. Pt stated completely stopped taking lisinopril due to unable to manage the coughing. Also completely stopped HCTZ as well. Informed pt to get in contact with PCP to discuss an alternative hypertensive medication for them. Pt verbalized understanding of information discussed w/ no further questions at this time.

## 2019-09-15 DIAGNOSIS — F51.01 PRIMARY INSOMNIA: ICD-10-CM

## 2019-09-16 RX ORDER — ZOLPIDEM TARTRATE 12.5 MG/1
TABLET, FILM COATED, EXTENDED RELEASE ORAL
Qty: 15 TAB | Refills: 1 | Status: SHIPPED | OUTPATIENT
Start: 2019-09-16 | End: 2019-11-18 | Stop reason: SDUPTHER

## 2019-10-04 RX ORDER — NAPROXEN 500 MG/1
TABLET ORAL
Qty: 30 TAB | Refills: 0 | Status: SHIPPED | OUTPATIENT
Start: 2019-10-04 | End: 2019-12-24

## 2019-10-07 DIAGNOSIS — I10 ESSENTIAL HYPERTENSION, BENIGN: Primary | ICD-10-CM

## 2019-10-07 RX ORDER — VALSARTAN 160 MG/1
160 TABLET ORAL DAILY
Qty: 30 TAB | Refills: 5 | Status: SHIPPED | OUTPATIENT
Start: 2019-10-07 | End: 2020-05-04 | Stop reason: SDUPTHER

## 2019-11-18 DIAGNOSIS — F51.01 PRIMARY INSOMNIA: ICD-10-CM

## 2019-11-19 RX ORDER — ZOLPIDEM TARTRATE 12.5 MG/1
TABLET, FILM COATED, EXTENDED RELEASE ORAL
Qty: 15 TAB | Refills: 1 | Status: SHIPPED | OUTPATIENT
Start: 2019-11-19 | End: 2019-12-19 | Stop reason: SDUPTHER

## 2019-12-09 RX ORDER — HYDROCHLOROTHIAZIDE 12.5 MG/1
TABLET ORAL
Qty: 30 TAB | Refills: 2 | Status: SHIPPED | OUTPATIENT
Start: 2019-12-09 | End: 2020-04-23

## 2019-12-19 ENCOUNTER — OFFICE VISIT (OUTPATIENT)
Dept: INTERNAL MEDICINE CLINIC | Age: 59
End: 2019-12-19

## 2019-12-19 VITALS
OXYGEN SATURATION: 98 % | BODY MASS INDEX: 32.14 KG/M2 | WEIGHT: 217 LBS | HEART RATE: 73 BPM | SYSTOLIC BLOOD PRESSURE: 132 MMHG | HEIGHT: 69 IN | DIASTOLIC BLOOD PRESSURE: 78 MMHG | RESPIRATION RATE: 15 BRPM | TEMPERATURE: 98.3 F

## 2019-12-19 DIAGNOSIS — E78.2 MIXED HYPERLIPIDEMIA: ICD-10-CM

## 2019-12-19 DIAGNOSIS — E55.9 VITAMIN D DEFICIENCY: ICD-10-CM

## 2019-12-19 DIAGNOSIS — I10 ESSENTIAL HYPERTENSION, BENIGN: ICD-10-CM

## 2019-12-19 DIAGNOSIS — F51.01 PRIMARY INSOMNIA: ICD-10-CM

## 2019-12-19 DIAGNOSIS — R73.01 IMPAIRED FASTING GLUCOSE: ICD-10-CM

## 2019-12-19 DIAGNOSIS — Z00.00 ROUTINE GENERAL MEDICAL EXAMINATION AT A HEALTH CARE FACILITY: Primary | ICD-10-CM

## 2019-12-19 DIAGNOSIS — Z12.5 SCREENING FOR PROSTATE CANCER: ICD-10-CM

## 2019-12-19 RX ORDER — ZOLPIDEM TARTRATE 12.5 MG/1
TABLET, FILM COATED, EXTENDED RELEASE ORAL
Qty: 15 TAB | Refills: 1 | Status: SHIPPED | OUTPATIENT
Start: 2019-12-19 | End: 2020-03-23

## 2019-12-19 RX ORDER — CHOLECALCIFEROL (VITAMIN D3) 125 MCG
CAPSULE ORAL
COMMUNITY
End: 2022-10-21

## 2019-12-19 NOTE — PROGRESS NOTES
Health Maintenance Due   Topic Date Due    Shingrix Vaccine Age 49> (1 of 2) 07/28/2012    Influenza Age 5 to Adult  08/01/2019    COLONOSCOPY  11/10/2019       Chief Complaint   Patient presents with    Hypertension    Cholesterol Problem    Blood sugar problem     Prediabetes       1. Have you been to the ER, urgent care clinic since your last visit? Hospitalized since your last visit? Yes When: 7/25/19 [facility] urgent clinic for sinus issues    2. Have you seen or consulted any other health care providers outside of the [address] [place] [address] since your last visit? Include any pap smears or colon screening. No    3) Do you have an Advance Directive on file? no    4) Are you interested in receiving information on Advance Directives? NO      Patient is accompanied by self I have received verbal consent from Betty Davila. to discuss any/all medical information while they are present in the room.

## 2019-12-19 NOTE — PROGRESS NOTES
HISTORY OF PRESENT ILLNESS  Jing Vargas is a 64 y.o. male here to follow up. He has HTN,on med. Stable . He legs also doing better than other medicine since it is not causing him cough. Refills is given. Has insomnia and sleep apnea. Sleeping is better since he is not working at night she is much. And needs some refill of Ambien. Has prediabetes in past,A1C has been stable. Has elevated lipid, on statin. No myalgia. Need lab work. Refused flu shot. Colonoscopy is up-to-date. Hypertension      Cholesterol Problem     GERD         Review of Systems   Constitutional: Negative. HENT: Negative. Eyes: Negative. Respiratory: Negative. Cardiovascular: Negative. Gastrointestinal: Negative. Genitourinary: Negative. Musculoskeletal: Negative. Skin: Negative. Neurological: Negative. Endo/Heme/Allergies: Negative. Psychiatric/Behavioral: Negative. Physical Exam  Constitutional:       General: He is not in acute distress. Appearance: Normal appearance. He is well-developed. He is obese. HENT:      Mouth/Throat:      Pharynx: No oropharyngeal exudate. Neck:      Musculoskeletal: Normal range of motion. Thyroid: No thyromegaly. Vascular: No JVD. Cardiovascular:      Rate and Rhythm: Normal rate and regular rhythm. Pulses: Normal pulses. Heart sounds: Normal heart sounds. Pulmonary:      Effort: Pulmonary effort is normal. No respiratory distress. Breath sounds: Normal breath sounds. No wheezing. Musculoskeletal: Normal range of motion. Neurological:      Mental Status: He is alert. Psychiatric:         Mood and Affect: Mood normal.         Behavior: Behavior normal.         ASSESSMENT and PLAN  Diagnoses and all orders for this visit:    1. Routine general medical examination at a health care facility    Seems healthy. Advised to eat healthy exercise and try to lose weight. Will do CMP vitamin D and PSA for now.   Lipid panel is stable. 2. Essential hypertension, benign    Stable blood pressure. No more cough. Taking valsartan and hydrochlorothiazide. Will continue same.  -     METABOLIC PANEL, COMPREHENSIVE    3. Mixed hyperlipidemia  On Lipitor, lipid is stable. 4. Impaired fasting glucose  Last sugar was normal.  5. Vitamin D deficiency  -     VITAMIN D, 25 HYDROXY    6. Screening for prostate cancer  -     PSA W/ REFLX FREE PSA    7. Primary insomnia    Sleep cycle is better since he is only working 4 nights. Will give,  -     zolpidem CR (AMBIEN CR) 12.5 mg tablet; TAKE 1 TABLET BY MOUTH EVERYDAY AT BEDTIME          Follow-up Disposition: 4 months. Return if symptoms worsen or fail to improve. Advised him to call back or return to office if symptoms worsen/change/persist.   Discussed expected course/resolution/complications of diagnosis in detail with patient. Medication risks/benefits/costs/interactions/alternatives discussed with patient. He was given an after visit summary which includes diagnoses, current medications, & vitals. He expressed understanding with the diagnosis and plan.

## 2019-12-20 LAB
25(OH)D3+25(OH)D2 SERPL-MCNC: 36.7 NG/ML (ref 30–100)
ALBUMIN SERPL-MCNC: 4.6 G/DL (ref 3.5–5.5)
ALBUMIN/GLOB SERPL: 2.2 {RATIO} (ref 1.2–2.2)
ALP SERPL-CCNC: 59 IU/L (ref 39–117)
ALT SERPL-CCNC: 34 IU/L (ref 0–44)
AST SERPL-CCNC: 26 IU/L (ref 0–40)
BILIRUB SERPL-MCNC: 0.4 MG/DL (ref 0–1.2)
BUN SERPL-MCNC: 15 MG/DL (ref 6–24)
BUN/CREAT SERPL: 17 (ref 9–20)
CALCIUM SERPL-MCNC: 9.6 MG/DL (ref 8.7–10.2)
CHLORIDE SERPL-SCNC: 102 MMOL/L (ref 96–106)
CO2 SERPL-SCNC: 23 MMOL/L (ref 20–29)
CREAT SERPL-MCNC: 0.89 MG/DL (ref 0.76–1.27)
GLOBULIN SER CALC-MCNC: 2.1 G/DL (ref 1.5–4.5)
GLUCOSE SERPL-MCNC: 114 MG/DL (ref 65–99)
POTASSIUM SERPL-SCNC: 4.3 MMOL/L (ref 3.5–5.2)
PROT SERPL-MCNC: 6.7 G/DL (ref 6–8.5)
PSA SERPL-MCNC: 1 NG/ML (ref 0–4)
REFLEX CRITERIA: NORMAL
SODIUM SERPL-SCNC: 141 MMOL/L (ref 134–144)

## 2020-02-11 ENCOUNTER — OFFICE VISIT (OUTPATIENT)
Dept: URGENT CARE | Age: 60
End: 2020-02-11

## 2020-02-11 VITALS
DIASTOLIC BLOOD PRESSURE: 82 MMHG | RESPIRATION RATE: 16 BRPM | HEIGHT: 69 IN | TEMPERATURE: 98.9 F | SYSTOLIC BLOOD PRESSURE: 159 MMHG | OXYGEN SATURATION: 99 % | BODY MASS INDEX: 32.14 KG/M2 | WEIGHT: 217 LBS | HEART RATE: 70 BPM

## 2020-02-11 DIAGNOSIS — R09.81 CONGESTION OF NASAL SINUS: ICD-10-CM

## 2020-02-11 DIAGNOSIS — Z20.828 EXPOSURE TO THE FLU: Primary | ICD-10-CM

## 2020-02-11 DIAGNOSIS — H61.23 BILATERAL IMPACTED CERUMEN: ICD-10-CM

## 2020-02-11 LAB
FLUAV+FLUBV AG NOSE QL IA.RAPID: NEGATIVE POS/NEG
FLUAV+FLUBV AG NOSE QL IA.RAPID: NEGATIVE POS/NEG
VALID INTERNAL CONTROL?: YES

## 2020-02-11 RX ORDER — OSELTAMIVIR PHOSPHATE 75 MG/1
75 CAPSULE ORAL 2 TIMES DAILY
Qty: 10 CAP | Refills: 0 | Status: SHIPPED | OUTPATIENT
Start: 2020-02-11 | End: 2020-02-16

## 2020-02-11 NOTE — PATIENT INSTRUCTIONS
Earwax Blockage: Care Instructions  Your Care Instructions    Earwax is a natural substance that protects the ear canal. Normally, earwax drains from the ears and does not cause problems. Sometimes earwax builds up and hardens. Earwax blockage (also called cerumen impaction) can cause some loss of hearing and pain. When wax is tightly packed, you will need to have your doctor remove it. Follow-up care is a key part of your treatment and safety. Be sure to make and go to all appointments, and call your doctor if you are having problems. It's also a good idea to know your test results and keep a list of the medicines you take. How can you care for yourself at home? · Do not try to remove earwax with cotton swabs, fingers, or other objects. This can make the blockage worse and damage the eardrum. · If your doctor recommends that you try to remove earwax at home:  ? Soften and loosen the earwax with warm mineral oil. You also can try hydrogen peroxide mixed with an equal amount of room temperature water. Place 2 drops of the fluid, warmed to body temperature, in the ear two times a day for up to 5 days. ? Once the wax is loose and soft, all that is usually needed to remove it from the ear canal is a gentle, warm shower. Direct the water into the ear, then tip your head to let the earwax drain out. Dry your ear thoroughly with a hair dryer set on low. Hold the dryer several inches from your ear. ? If the warm mineral oil and shower do not work, use an over-the-counter wax softener. Read and follow all instructions on the label. After using the wax softener, use an ear syringe to gently flush the ear. Make sure the flushing solution is body temperature. Cool or hot fluids in the ear can cause dizziness. When should you call for help? Call your doctor now or seek immediate medical care if:    · Pus or blood drains from your ear.     · Your ears are ringing or feel full.     · You have a loss of hearing.  Watch closely for changes in your health, and be sure to contact your doctor if:    · You have pain or reduced hearing after 1 week of home treatment.     · You have any new symptoms, such as nausea or balance problems. Where can you learn more? Go to http://chalo-tahir.info/. Enter E404 in the search box to learn more about \"Earwax Blockage: Care Instructions. \"  Current as of: June 26, 2019  Content Version: 12.2  © 0230-1759 [employer]. Care instructions adapted under license by [employer] (which disclaims liability or warranty for this information). If you have questions about a medical condition or this instruction, always ask your healthcare professional. [address] any warranty or liability for your use of this information.

## 2020-02-11 NOTE — PROGRESS NOTES
Cold Symptoms   The history is provided by the patient. This is a new problem. The current episode started yesterday (travelling to [facility] tomorrow). The problem occurs constantly. The problem has not changed since onset. There has been no fever. Associated symptoms include headaches and rhinorrhea (and sinus congestion). Pertinent negatives include no chest pain, no chills, no sweats, no ear congestion, no ear pain, no sore throat, no myalgias, no shortness of breath and no wheezing. Treatments tried: advil. The treatment provided mild relief.         Past Medical History:   Diagnosis Date    CAD (coronary artery disease) 2016    mild; calcium scoring score=46    GERD (gastroesophageal reflux disease)     Hypertension     Mixed hyperlipidemia     LDL goal <70    Prediabetes 09/2016    Rectal polyp 11/10/14    Rotator cuff tear, left     Dr. John Phoenix Sleep disturbance         Past Surgical History:   Procedure Laterality Date    HX COLONOSCOPY  11/10/14    +rectal polyp (hyperplastic), Dr. Arvin Liz HX KNEE ARTHROSCOPY Right 2006    HX MENISCUS REPAIR Right 06/21/2016    Dr. John Phoenix HX Avonne Dus Right 2008    HX Dorla Zenaida  09/2016    Dr. Salinas Persons         Family History   Problem Relation Age of Onset    Heart Disease Father         CAD, age 72    Diabetes Father     Cancer Father         lung--smoker    Cancer Paternal Uncle         lung - smoker        Social History     Socioeconomic History    Marital status: SINGLE     Spouse name: single    Number of children: 2    Years of education: Not on file    Highest education level: Not on file   Occupational History    Occupation: [employer]--hourly    Social Needs    Financial resource strain: Not on file    Food insecurity:     Worry: Not on file     Inability: Not on file   C[employer] needs:     Medical: Not on file     Non-medical: Not on file   Tobacco Use    Smoking status: Never Smoker    Smokeless tobacco: Never Used   Substance and Sexual Activity    Alcohol use: Yes     Alcohol/week: 3.0 - 5.0 standard drinks     Types: 3 - 5 Standard drinks or equivalent per week     Comment: social    Drug use: No    Sexual activity: Yes     Partners: Female     Comment: single,2 daughters,working in [address] [place] [address],[address] Physical activity:     Days per week: Not on file     Minutes per session: Not on file    Stress: Not on file   Relationships    Social connections:     Talks on phone: Not on file     Gets together: Not on file     Attends Rastafari service: Not on file     Active member of club or organization: Not on file     Attends meetings of clubs or organizations: Not on file     Relationship status: Not on file    Intimate partner violence:     Fear of current or ex partner: Not on file     Emotionally abused: Not on file     Physically abused: Not on file     Forced sexual activity: Not on file   Other Topics Concern    Not on file   Social History Narrative    Not on file                ALLERGIES: Bee sting [sting, bee]; Amlodipine; Demerol [meperidine]; and Lisinopril    Review of Systems   Constitutional: Negative for activity change, appetite change, chills and fever. HENT: Positive for congestion and rhinorrhea (and sinus congestion). Negative for ear pain and sore throat. Respiratory: Negative for cough, shortness of breath and wheezing. Cardiovascular: Negative for chest pain and palpitations. Musculoskeletal: Negative for myalgias. Skin: Negative for rash. Neurological: Positive for headaches. Hematological: Negative for adenopathy. Vitals:    02/11/20 1407   BP: 159/82   Pulse: 70   Resp: 16   Temp: 98.9 °F (37.2 °C)   SpO2: 99%   Weight: 217 lb (98.4 kg)   Height: 5' 9\" (1.753 m)       Physical Exam  Vitals signs and nursing note reviewed. Constitutional:       General: He is not in acute distress. Appearance: He is well-developed.  He is not diaphoretic. HENT:      Right Ear: Tympanic membrane, ear canal and external ear normal. There is impacted cerumen (s/p irrigation). Left Ear: Tympanic membrane, ear canal and external ear normal. There is impacted cerumen (s/p irrigation). Nose: Congestion and rhinorrhea present. Right Sinus: No maxillary sinus tenderness or frontal sinus tenderness. Left Sinus: No maxillary sinus tenderness or frontal sinus tenderness. Mouth/Throat:      Pharynx: No oropharyngeal exudate or posterior oropharyngeal erythema. Tonsils: No tonsillar abscesses. Cardiovascular:      Rate and Rhythm: Normal rate and regular rhythm. Heart sounds: Normal heart sounds. Pulmonary:      Effort: Pulmonary effort is normal. No respiratory distress. Breath sounds: Normal breath sounds. No wheezing or rales. Lymphadenopathy:      Cervical: No cervical adenopathy. Neurological:      Mental Status: He is alert. Psychiatric:         Behavior: Behavior normal.         Thought Content: Thought content normal.         Judgment: Judgment normal.         MDM    ICD-10-CM ICD-9-CM   1. Exposure to the flu Z20.828 V01.79   2. Congestion of nasal sinus R09.81 478.19   3. Bilateral impacted cerumen H61.23 380.4       Orders Placed This Encounter    REMOVE IMPACTED EAR WAX    AMB POC HOLDEN INFLUENZA A/B TEST    oseltamivir (TAMIFLU) 75 mg capsule     Sig: Take 1 Cap by mouth two (2) times a day for 5 days. Dispense:  10 Cap     Refill:  0      Fluids  The patient is to follow up with PCP [facility]     If signs and symptoms become worse the pt is to go to the ER.      Results for orders placed or performed in visit on 02/11/20   AMB POC HOLDEN INFLUENZA A/B TEST   Result Value Ref Range    VALID INTERNAL CONTROL POC Yes     Influenza A Ag POC Negative Negative Pos/Neg    Influenza B Ag POC Negative Negative Pos/Neg       Procedures

## 2020-02-18 RX ORDER — ATORVASTATIN CALCIUM 20 MG/1
TABLET, FILM COATED ORAL
Qty: 30 TAB | Refills: 6 | Status: SHIPPED | OUTPATIENT
Start: 2020-02-18 | End: 2020-05-20 | Stop reason: SDUPTHER

## 2020-03-17 DIAGNOSIS — F51.01 PRIMARY INSOMNIA: ICD-10-CM

## 2020-03-17 RX ORDER — ZOLPIDEM TARTRATE 12.5 MG/1
TABLET, FILM COATED, EXTENDED RELEASE ORAL
Qty: 15 TAB | Refills: 1 | OUTPATIENT
Start: 2020-03-17

## 2020-03-17 NOTE — TELEPHONE ENCOUNTER
Requested Prescriptions     Pending Prescriptions Disp Refills    zolpidem CR (AMBIEN CR) 12.5 mg tablet 15 Tab 1     Sig: TAKE 1 TABLET BY MOUTH EVERYDAY AT BEDTIME   12/19/2019  No upcoming appointments  [facility] on file

## 2020-04-06 DIAGNOSIS — F51.01 PRIMARY INSOMNIA: ICD-10-CM

## 2020-04-06 RX ORDER — ZOLPIDEM TARTRATE 12.5 MG/1
12.5 TABLET, FILM COATED, EXTENDED RELEASE ORAL
Qty: 30 TAB | Refills: 1 | OUTPATIENT
Start: 2020-04-06

## 2020-04-23 RX ORDER — HYDROCHLOROTHIAZIDE 12.5 MG/1
TABLET ORAL
Qty: 30 TAB | Refills: 0 | Status: SHIPPED | OUTPATIENT
Start: 2020-04-23 | End: 2020-05-20 | Stop reason: SDUPTHER

## 2020-05-04 ENCOUNTER — TELEPHONE (OUTPATIENT)
Dept: INTERNAL MEDICINE CLINIC | Age: 60
End: 2020-05-04

## 2020-05-04 ENCOUNTER — VIRTUAL VISIT (OUTPATIENT)
Dept: INTERNAL MEDICINE CLINIC | Age: 60
End: 2020-05-04

## 2020-05-04 VITALS — BODY MASS INDEX: 32.05 KG/M2 | HEIGHT: 69 IN

## 2020-05-04 DIAGNOSIS — M62.830 BACK SPASM: Primary | ICD-10-CM

## 2020-05-04 DIAGNOSIS — R73.03 PREDIABETES: ICD-10-CM

## 2020-05-04 DIAGNOSIS — G47.00 INSOMNIA, UNSPECIFIED TYPE: ICD-10-CM

## 2020-05-04 DIAGNOSIS — I10 ESSENTIAL HYPERTENSION, BENIGN: ICD-10-CM

## 2020-05-04 RX ORDER — VALSARTAN 160 MG/1
160 TABLET ORAL DAILY
Qty: 30 TAB | Refills: 5 | Status: SHIPPED | OUTPATIENT
Start: 2020-05-04 | End: 2020-05-26

## 2020-05-04 RX ORDER — BACLOFEN 10 MG/1
10 TABLET ORAL
Qty: 30 TAB | Refills: 0 | Status: SHIPPED | OUTPATIENT
Start: 2020-05-04 | End: 2022-04-20

## 2020-05-04 NOTE — PROGRESS NOTES
Shannon Hooks is a 64 y.o. male who was seen by synchronous (real-time) audio-video technology on 5/4/2020. Consent: Shannon Hooks, who was seen by synchronous (real-time) audio-video technology, and/or his healthcare decision maker, is aware that this patient-initiated, Telehealth encounter on 5/4/2020 is a billable service, with coverage as determined by his insurance carrier. He is aware that he may receive a bill and has provided verbal consent to proceed: Yes. Assessment & Plan:   Diagnoses and all orders for this visit:    1. Back spasm    Advised to use heating pad and massage. Will call in,  -     baclofen (LIORESAL) 10 mg tablet; Take 1 Tab by mouth two (2) times daily as needed for Muscle Spasm(s). #30, no refill. 2. Essential hypertension, benign    Stable blood pressure. On hydrochlorothiazide and valsartan. Will call in,  -     valsartan (DIOVAN) 160 mg tablet; Take 1 Tab by mouth daily. DC losartan      3. Prediabetes  Advised to watch carbohydrate. Last A1c was normal.  4. Insomnia, unspecified type  Taking Ambien CR as needed. Refill already given. I spent at least 40 minutes on this visit with this established patient. ([id number])    Subjective:   Shannon Hooks is a 64 y.o. male who was seen for Medication Refill (Baclofen); Hypertension; Cholesterol Problem; and Blood sugar problem (Prediabetes)    Mr. Migdalia Flynn is doing well. He is working from home. Report occasional lower back pain and stiffness. He is not able to sleep well for back pain. Would like to get a refill on baclofen that he received in the past.  Has hypertension, compliant with medications. Denies chest pain palpitation or shortness of breath. Needs refill on medicine. Is prediabetic, watching carbohydrate. Labs reviewed with him. All are stable few months back. Has chronic insomnia. Using Ambien CR sometimes. Refill already given.   Colonoscopy done already returned colonoscopy is 2024.  Prior to Admission medications    Medication Sig Start Date End Date Taking? Authorizing Provider   valsartan (DIOVAN) 160 mg tablet Take 1 Tab by mouth daily. DC losartan 5/4/20  Yes Dewayne Avina MD   baclofen (LIORESAL) 10 mg tablet Take 1 Tab by mouth two (2) times daily as needed for Muscle Spasm(s). 5/4/20  Yes Dewayne Avina MD   hydroCHLOROthiazide (HYDRODIURIL) 12.5 mg tablet Take 1 tablet by mouth once daily 4/23/20  Yes Dewayne Avina MD   zolpidem CR (AMBIEN CR) 12.5 mg tablet TAKE 1 TABLET BY MOUTH EVERYDAY AT BEDTIME  Patient taking differently: TAKE 1 TABLET BY MOUTH EVERYDAY AS NEEDED AT BEDTIME 4/23/20  Yes Dewayne Avina MD   atorvastatin (LIPITOR) 20 mg tablet TAKE 1 TABLET BY MOUTH DAILY. 2/18/20  Yes Dewayne Avina MD   naproxen (NAPROSYN) 500 mg tablet Take 1 Tab by mouth daily as needed for Pain. 12/24/19  Yes Dewayne Avina MD   cholecalciferol, vitamin D3, (VITAMIN D3) 2,000 unit tab Take  by mouth. Yes Provider, Historical   omeprazole (PRILOSEC) 40 mg capsule TAKE 1 CAP BY MOUTH DAILY AS NEEDED. 8/12/19  Yes Dewayne Avina MD   glucosamine sulfate 500 mg capsule Take  by mouth three (3) times daily. Yes Provider, Historical   Omega-3-DHA-EPA-Fish Oil 1,000 mg (120 mg-180 mg) cap Take 2 Tabs by mouth two (2) times a day. 3/8/17  Yes Matilda Kruse PA   niacin-inositol (NIACIN FLUSH FREE) 400 mg niacin (500 mg) cap Take 1 Cap by mouth daily. 3/14/16  Yes Matilda Kruse PA   aspirin 81 mg chewable tablet Take 1 tablet by mouth daily. Yes Matilda Kruse PA   selenium 200 mcg Tab Take 400 mcg by mouth daily. Yes Provider, Historical   ascorbic acid (VITAMIN C) 500 mg tablet Take  by mouth. Yes Provider, Historical   pyridoxine (VITAMIN B-6) 100 mg tablet Take 100 mg by mouth every other day. Yes Provider, Historical   valsartan (DIOVAN) 160 mg tablet Take 1 Tab by mouth daily.  DC losartan 10/7/19 5/4/20  Dewayne Avina MD     Allergies   Allergen Reactions    Bee Sting [Sting, Bee] Anaphylaxis    Amlodipine Other (comments)     Increased GERD    Demerol [Meperidine] Unknown (comments)     Doubles over in pain    Lisinopril Cough and Other (comments)     Fatigue         Past Medical History:   Diagnosis Date    CAD (coronary artery disease) 2016    mild; calcium scoring score=46    GERD (gastroesophageal reflux disease)     Hypertension     Mixed hyperlipidemia     LDL goal <70    Prediabetes 09/2016    Rectal polyp 11/10/14    Rotator cuff tear, left     Dr. Pattie Pack Sleep disturbance        ROS    Objective:   Vital Signs: (As obtained by patient/caregiver at home)  Visit Vitals  Ht 5' 9\" (1.753 m)   BMI 32.05 kg/m²            Constitutional: [x] Appears well-developed and well-nourished [x] No apparent distress      [] Abnormal -     Mental status: [x] Alert and awake  [x] Oriented to person/place/time [x] Able to follow commands    [] Abnormal -       HENT: [x] Normocephalic, atraumatic  [] Abnormal -   [x] Mouth/Throat: Mucous membranes are moist    External Ears [x] Normal  [] Abnormal -    Neck: [x] No visualized mass [] Abnormal -     Pulmonary/Chest: [x] Respiratory effort normal   [x] No visualized signs of difficulty breathing or respiratory distress        [] Abnormal -      Musculoskeletal:   [x] Normal gait with no signs of ataxia         [x] Normal range of motion of neck        [] Abnormal -   Lower back: Paravertebral muscle spasm and stiffness present. Range of motion mildly restricted.   Neurological:        [x] No Facial Asymmetry (Cranial nerve 7 motor function) (limited exam due to video visit)          [x] No gaze palsy        [] Abnormal -          Skin:        [x] No significant exanthematous lesions or discoloration noted on facial skin         [] Abnormal -            Psychiatric:       [x] Normal Affect [] Abnormal -        [x] No Hallucinations    Other pertinent observable physical exam findings:-        We discussed the expected course, resolution and complications of the diagnosis(es) in detail. Medication risks, benefits, costs, interactions, and alternatives were discussed as indicated. I advised him to contact the office if his condition worsens, changes or fails to improve as anticipated. He expressed understanding with the diagnosis(es) and plan. Patrick Ko is a 64 y.o. male who was evaluated by a video visit encounter for concerns as above. Patient identification was verified prior to start of the visit. A caregiver was present when appropriate. Due to this being a TeleHealth encounter (During [facility] public health emergency), evaluation of the following organ systems was limited: Vitals/Constitutional/EENT/Resp/CV/GI//MS/Neuro/Skin/Heme-Lymph-Imm. Pursuant to the emergency declaration under the [facility], [facility]5 waiver authority and the [employer] General Act, this Virtual  Visit was conducted, with patient's (and/or legal guardian's) consent, to reduce the patient's risk of exposure to COVID-19 and provide necessary medical care. Services were provided through a video synchronous discussion virtually to substitute for in-person clinic visit. Patient and provider were located at their individual homes.       Mirna Shankar MD

## 2020-05-04 NOTE — PROGRESS NOTES
Health Maintenance Due   Topic Date Due    Shingrix Vaccine Age 49> (1 of 2) 07/28/2012    Colonoscopy  11/10/2019       No chief complaint on file. 1. Have you been to the ER, urgent care clinic since your last visit? Hospitalized since your last visit? Yes, urgent care in February for flu s/sx    2. Have you seen or consulted any other health care providers outside of the [address] [place] [address] since your last visit? Include any pap smears or colon screening. No    3) Do you have an Advance Directive on file? no    4) Are you interested in receiving information on Advance Directives? NO      Patient is accompanied by self I have received verbal consent from Pankaj Benitez. to discuss any/all medical information while they are present in the room.

## 2020-05-04 NOTE — TELEPHONE ENCOUNTER
Pt states he may be due for labs. If so please contact him to let him know.  He scheduled his next ov for 7/22/20

## 2020-05-04 NOTE — TELEPHONE ENCOUNTER
Per verbal order by Dr. Kenji Rahman, read back for confirmation, called the pt and left a VM advising that no labs need to be done at present.

## 2020-05-18 DIAGNOSIS — F51.01 PRIMARY INSOMNIA: ICD-10-CM

## 2020-05-18 RX ORDER — ZOLPIDEM TARTRATE 12.5 MG/1
TABLET, FILM COATED, EXTENDED RELEASE ORAL
Qty: 15 TAB | Refills: 0 | OUTPATIENT
Start: 2020-05-18

## 2020-05-20 NOTE — TELEPHONE ENCOUNTER
Requested Prescriptions     Pending Prescriptions Disp Refills    zolpidem CR (AMBIEN CR) 12.5 mg tablet 15 Tab 0    hydroCHLOROthiazide (HYDRODIURIL) 12.5 mg tablet 30 Tab 0    atorvastatin (LIPITOR) 20 mg tablet 30 Tab 6     Refused Prescriptions Disp Refills    zolpidem CR (AMBIEN CR) 12.5 mg tablet [Pharmacy Med Name: ZOLPIDEM TART ER 12.5 MG TAB] 15 Tab 0     Sig: TAKE 1 TABLET BY MOUTH EVERYDAY AT BEDTIME     Refused By: [employer] [place] Tisha     Reason for Refusal: Patient has requested refill too soon   05/04/2020 07/22/2020  [facility]  ----- Message from Lynn Bernstein sent at 5/20/2020  2:29 PM EDT -----  Regarding: Renetta Degroot MD  Medication Refill    Caller (if not patient):      Relationship of caller (if not patient):      Best contact number(s): [phone]      Name of medication and dosage if known: zolpidem CR (AMBIEN CR) 12.5 mg tablet, hydroCHLOROthiazide (HYDRODIURIL) 12.5 mg tablet, atorvastatin (LIPITOR) 20 mg tablet      Is patient out of this medication (yes/no): Yes      Pharmacy name: [id number] [facility] [address] AT [address] [facility] listed in chart? (yes/no):  Pharmacy phone number:      Details to clarify the request:      Lynn Bernstein

## 2020-05-21 RX ORDER — ZOLPIDEM TARTRATE 12.5 MG/1
12.5 TABLET, FILM COATED, EXTENDED RELEASE ORAL
Qty: 15 TAB | Refills: 2 | Status: SHIPPED | OUTPATIENT
Start: 2020-05-21 | End: 2020-08-21

## 2020-05-21 RX ORDER — ATORVASTATIN CALCIUM 20 MG/1
20 TABLET, FILM COATED ORAL DAILY
Qty: 30 TAB | Refills: 5 | Status: SHIPPED | OUTPATIENT
Start: 2020-05-21 | End: 2020-12-24

## 2020-05-21 RX ORDER — HYDROCHLOROTHIAZIDE 12.5 MG/1
12.5 TABLET ORAL DAILY
Qty: 30 TAB | Refills: 5 | Status: SHIPPED | OUTPATIENT
Start: 2020-05-21 | End: 2020-07-08

## 2020-05-23 DIAGNOSIS — I10 ESSENTIAL HYPERTENSION, BENIGN: ICD-10-CM

## 2020-05-26 RX ORDER — VALSARTAN 160 MG/1
TABLET ORAL
Qty: 30 TAB | Refills: 5 | Status: SHIPPED | OUTPATIENT
Start: 2020-05-26 | End: 2021-02-08

## 2020-06-24 NOTE — TELEPHONE ENCOUNTER
Pt needs refill on his epi pen. Generic is ok.  Pt needs this by Saturday 6/27 because he is going out of town

## 2020-06-25 DIAGNOSIS — Z79.899 ENCOUNTER FOR LONG-TERM (CURRENT) USE OF MEDICATIONS: ICD-10-CM

## 2020-06-25 RX ORDER — EPINEPHRINE 0.3 MG/.3ML
0.3 INJECTION SUBCUTANEOUS
Qty: 2 SYRINGE | Refills: 1 | Status: CANCELLED | OUTPATIENT
Start: 2020-06-25 | End: 2020-06-25

## 2020-06-25 RX ORDER — EPINEPHRINE 0.3 MG/.3ML
0.3 INJECTION SUBCUTANEOUS
Qty: 1 SYRINGE | Refills: 1 | Status: SHIPPED | OUTPATIENT
Start: 2020-06-25 | End: 2020-06-25

## 2020-07-06 ENCOUNTER — TELEPHONE (OUTPATIENT)
Dept: INTERNAL MEDICINE CLINIC | Age: 60
End: 2020-07-06

## 2020-07-06 NOTE — TELEPHONE ENCOUNTER
Returned the pt's call and after verifying HIPAA discussed his s/sx. Per the pt he had this issue when he was younger and was treated with Nizoral. Appt made for Monday July 13, 2020 @ 8315. The pt voiced thanks and understanding.

## 2020-07-06 NOTE — TELEPHONE ENCOUNTER
----- Message from Jillian Arias sent at 7/3/2020  9:02 AM EDT -----  Regarding: Dr. Colleen Colindres Message/Vendor Calls    Caller's first and last name:Eddie Harrison      Reason for call:pigmentation on skin on back      Callback required yes/no and why:yes      Best contact number(s):[phone]      Details to clarify the request:  Please call patient back    Jillian Arias

## 2020-07-13 ENCOUNTER — OFFICE VISIT (OUTPATIENT)
Dept: INTERNAL MEDICINE CLINIC | Age: 60
End: 2020-07-13

## 2020-07-13 VITALS
RESPIRATION RATE: 15 BRPM | TEMPERATURE: 97.8 F | SYSTOLIC BLOOD PRESSURE: 126 MMHG | HEART RATE: 65 BPM | HEIGHT: 69 IN | DIASTOLIC BLOOD PRESSURE: 80 MMHG | OXYGEN SATURATION: 97 % | BODY MASS INDEX: 31.4 KG/M2 | WEIGHT: 212 LBS

## 2020-07-13 DIAGNOSIS — B36.0 MALASSEZIA FURFUR CAUSING TINEA: ICD-10-CM

## 2020-07-13 DIAGNOSIS — Z00.00 ROUTINE GENERAL MEDICAL EXAMINATION AT A HEALTH CARE FACILITY: Primary | ICD-10-CM

## 2020-07-13 DIAGNOSIS — E55.9 VITAMIN D DEFICIENCY: ICD-10-CM

## 2020-07-13 DIAGNOSIS — R73.01 ELEVATED FASTING BLOOD SUGAR: ICD-10-CM

## 2020-07-13 DIAGNOSIS — E78.2 MIXED HYPERLIPIDEMIA: ICD-10-CM

## 2020-07-13 DIAGNOSIS — I10 ESSENTIAL HYPERTENSION, BENIGN: ICD-10-CM

## 2020-07-13 RX ORDER — NYSTATIN 100000 U/G
CREAM TOPICAL 2 TIMES DAILY
Qty: 15 G | Refills: 1 | Status: SHIPPED | OUTPATIENT
Start: 2020-07-13 | End: 2021-06-14

## 2020-07-13 RX ORDER — FLUCONAZOLE 150 MG/1
150 TABLET ORAL DAILY
Qty: 3 TAB | Refills: 0 | Status: SHIPPED | OUTPATIENT
Start: 2020-07-13 | End: 2020-07-14

## 2020-07-13 NOTE — PROGRESS NOTES
Health Maintenance Due   Topic Date Due    Shingrix Vaccine Age 49> (1 of 2) 07/28/2012    Colonoscopy  11/10/2019    A1C test (Diabetic or Prediabetic)  06/17/2020    Lipid Screen  06/17/2020       No chief complaint on file. 1. Have you been to the ER, urgent care clinic since your last visit? Hospitalized since your last visit? No    2. Have you seen or consulted any other health care providers outside of the [address] [place] [address] since your last visit? Include any pap smears or colon screening. No    3) Do you have an Advance Directive on file? no    4) Are you interested in receiving information on Advance Directives? NO      Patient is accompanied by self I have received verbal consent from Franci Bai. to discuss any/all medical information while they are present in the room.

## 2020-07-13 NOTE — PROGRESS NOTES
HISTORY OF PRESENT ILLNESS  Ciaran Garrett is a 64 y.o. male here to follow up. Here for complete physical.  He went to [facility] in [place], [address] [place] [address] for a week. Noticed to have rash and itching and upper trunk. He has HTN,on med. Stable . He legs also doing better than other medicine since it is not causing him cough. Refills is given. Has insomnia and sleep apnea. Using Ambien as needed. Has prediabetes in past,A1C has been stable. Has elevated lipid, on statin. No myalgia. Need lab work. Hypertension      Cholesterol Problem     GERD     Skin Problem         Review of Systems   Constitutional: Negative. HENT: Negative. Eyes: Negative. Respiratory: Negative. Cardiovascular: Negative. Gastrointestinal: Negative. Genitourinary: Negative. Musculoskeletal: Negative. Skin: Positive for itching and rash. Neurological: Negative. Endo/Heme/Allergies: Negative. Psychiatric/Behavioral: Negative. Physical Exam  Constitutional:       General: He is not in acute distress. Appearance: Normal appearance. He is well-developed. He is obese. HENT:      Mouth/Throat:      Pharynx: No oropharyngeal exudate. Neck:      Musculoskeletal: Normal range of motion and neck supple. Thyroid: No thyromegaly. Vascular: No JVD. Cardiovascular:      Rate and Rhythm: Normal rate and regular rhythm. Pulses: Normal pulses. Heart sounds: Normal heart sounds. Pulmonary:      Effort: Pulmonary effort is normal. No respiratory distress. Breath sounds: Normal breath sounds. No wheezing. Abdominal:      General: Abdomen is flat. Palpations: Abdomen is soft. Musculoskeletal: Normal range of motion. Skin:     General: Skin is warm. Comments: Has whitish patch semicircular area in size all over his upper back and the front of the trunk. Slightly itchy. Neurological:      General: No focal deficit present.       Mental Status: He is alert and oriented to person, place, and time. Psychiatric:         Mood and Affect: Mood normal.         Behavior: Behavior normal.         ASSESSMENT and PLAN  Diagnoses and all orders for this visit:    1. Routine general medical examination at a health care facility    Seems healthy. adv to eat healthy and exercise. Will order,  -     CBC WITH AUTOMATED DIFF  -     LIPID PANEL  -     METABOLIC PANEL, COMPREHENSIVE  -     URINALYSIS W/ RFLX MICROSCOPIC  -     TSH 3RD GENERATION    2. Malassezia furfur causing tinea    Will order,  -     fluconazole (DIFLUCAN) 150 mg tablet; Take 1 Tab by mouth daily for 1 day. FDA advises cautious prescribing of oral fluconazole in pregnancy. -     nystatin (MYCOSTATIN) topical cream; Apply  to affected area two (2) times a day. Apply top BID    3. Essential hypertension, benign    Stable. on diovan. 4. Mixed hyperlipidemia  On statin. 5. Elevated fasting blood sugar    Will order,  -     HEMOGLOBIN A1C WITH EAG    6. Vitamin D deficiency  -     VITAMIN D, 25 HYDROXY              Follow-up Disposition: 4 months. Return if symptoms worsen or fail to improve. Advised him to call back or return to office if symptoms worsen/change/persist.   Discussed expected course/resolution/complications of diagnosis in detail with patient. Medication risks/benefits/costs/interactions/alternatives discussed with patient. He was given an after visit summary which includes diagnoses, current medications, & vitals. He expressed understanding with the diagnosis and plan.

## 2020-07-14 LAB
25(OH)D3+25(OH)D2 SERPL-MCNC: 30.6 NG/ML (ref 30–100)
ALBUMIN SERPL-MCNC: 4.8 G/DL (ref 3.8–4.9)
ALBUMIN/GLOB SERPL: 2 {RATIO} (ref 1.2–2.2)
ALP SERPL-CCNC: 60 IU/L (ref 39–117)
ALT SERPL-CCNC: 38 IU/L (ref 0–44)
APPEARANCE UR: CLEAR
AST SERPL-CCNC: 27 IU/L (ref 0–40)
BASOPHILS # BLD AUTO: 0 X10E3/UL (ref 0–0.2)
BASOPHILS NFR BLD AUTO: 1 %
BILIRUB SERPL-MCNC: 0.6 MG/DL (ref 0–1.2)
BILIRUB UR QL STRIP: NEGATIVE
BUN SERPL-MCNC: 16 MG/DL (ref 6–24)
BUN/CREAT SERPL: 18 (ref 9–20)
CALCIUM SERPL-MCNC: 9.1 MG/DL (ref 8.7–10.2)
CHLORIDE SERPL-SCNC: 102 MMOL/L (ref 96–106)
CHOLEST SERPL-MCNC: 148 MG/DL (ref 100–199)
CO2 SERPL-SCNC: 23 MMOL/L (ref 20–29)
COLOR UR: YELLOW
CREAT SERPL-MCNC: 0.88 MG/DL (ref 0.76–1.27)
EOSINOPHIL # BLD AUTO: 0.1 X10E3/UL (ref 0–0.4)
EOSINOPHIL NFR BLD AUTO: 3 %
ERYTHROCYTE [DISTWIDTH] IN BLOOD BY AUTOMATED COUNT: 13.3 % (ref 11.6–15.4)
EST. AVERAGE GLUCOSE BLD GHB EST-MCNC: 111 MG/DL
GLOBULIN SER CALC-MCNC: 2.4 G/DL (ref 1.5–4.5)
GLUCOSE SERPL-MCNC: 105 MG/DL (ref 65–99)
GLUCOSE UR QL: NEGATIVE
HBA1C MFR BLD: 5.5 % (ref 4.8–5.6)
HCT VFR BLD AUTO: 41.7 % (ref 37.5–51)
HDLC SERPL-MCNC: 51 MG/DL
HGB BLD-MCNC: 14.4 G/DL (ref 13–17.7)
HGB UR QL STRIP: NEGATIVE
IMM GRANULOCYTES # BLD AUTO: 0 X10E3/UL (ref 0–0.1)
IMM GRANULOCYTES NFR BLD AUTO: 0 %
INTERPRETATION, 910389: NORMAL
KETONES UR QL STRIP: NEGATIVE
LDLC SERPL CALC-MCNC: 61 MG/DL (ref 0–99)
LEUKOCYTE ESTERASE UR QL STRIP: NEGATIVE
LYMPHOCYTES # BLD AUTO: 1.4 X10E3/UL (ref 0.7–3.1)
LYMPHOCYTES NFR BLD AUTO: 26 %
MCH RBC QN AUTO: 31.4 PG (ref 26.6–33)
MCHC RBC AUTO-ENTMCNC: 34.5 G/DL (ref 31.5–35.7)
MCV RBC AUTO: 91 FL (ref 79–97)
MICRO URNS: NORMAL
MONOCYTES # BLD AUTO: 0.5 X10E3/UL (ref 0.1–0.9)
MONOCYTES NFR BLD AUTO: 10 %
NEUTROPHILS # BLD AUTO: 3.4 X10E3/UL (ref 1.4–7)
NEUTROPHILS NFR BLD AUTO: 60 %
NITRITE UR QL STRIP: NEGATIVE
PH UR STRIP: 5.5 [PH] (ref 5–7.5)
PLATELET # BLD AUTO: 178 X10E3/UL (ref 150–450)
POTASSIUM SERPL-SCNC: 4.3 MMOL/L (ref 3.5–5.2)
PROT SERPL-MCNC: 7.2 G/DL (ref 6–8.5)
PROT UR QL STRIP: NEGATIVE
RBC # BLD AUTO: 4.59 X10E6/UL (ref 4.14–5.8)
SODIUM SERPL-SCNC: 139 MMOL/L (ref 134–144)
SP GR UR: 1.02 (ref 1–1.03)
TRIGL SERPL-MCNC: 182 MG/DL (ref 0–149)
TSH SERPL DL<=0.005 MIU/L-ACNC: 1.8 UIU/ML (ref 0.45–4.5)
UROBILINOGEN UR STRIP-MCNC: 0.2 MG/DL (ref 0.2–1)
VLDLC SERPL CALC-MCNC: 36 MG/DL (ref 5–40)
WBC # BLD AUTO: 5.5 X10E3/UL (ref 3.4–10.8)

## 2020-07-17 NOTE — PROGRESS NOTES
Triglycerides elevated, stable from previous. Recommend limiting foods high in sugar, including carbohydrates, and exercise as tolerated. Otherwise, stable labs.

## 2020-08-21 DIAGNOSIS — F51.01 PRIMARY INSOMNIA: ICD-10-CM

## 2020-08-21 RX ORDER — ZOLPIDEM TARTRATE 12.5 MG/1
TABLET, FILM COATED, EXTENDED RELEASE ORAL
Qty: 15 TAB | Refills: 0 | Status: SHIPPED | OUTPATIENT
Start: 2020-08-21 | End: 2020-10-05 | Stop reason: SDUPTHER

## 2020-10-05 DIAGNOSIS — F51.01 PRIMARY INSOMNIA: ICD-10-CM

## 2020-10-06 RX ORDER — ZOLPIDEM TARTRATE 12.5 MG/1
TABLET, FILM COATED, EXTENDED RELEASE ORAL
Qty: 15 TAB | Refills: 0 | Status: SHIPPED | OUTPATIENT
Start: 2020-10-06 | End: 2020-11-08 | Stop reason: SDUPTHER

## 2020-10-16 ENCOUNTER — TELEPHONE (OUTPATIENT)
Dept: INTERNAL MEDICINE CLINIC | Age: 60
End: 2020-10-16

## 2020-10-16 RX ORDER — NAPROXEN 500 MG/1
500 TABLET ORAL
Qty: 30 TAB | Refills: 0 | Status: SHIPPED | OUTPATIENT
Start: 2020-10-16 | End: 2022-07-01

## 2020-10-16 NOTE — TELEPHONE ENCOUNTER
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  Please let him know you did this 2 years ago

## 2020-10-19 NOTE — TELEPHONE ENCOUNTER
Per provider letter comprised, call placed to patient and made aware, patient requested letter be sent in mail to home, letter placed in mail

## 2020-11-08 DIAGNOSIS — F51.01 PRIMARY INSOMNIA: ICD-10-CM

## 2020-11-09 ENCOUNTER — TELEPHONE (OUTPATIENT)
Dept: INTERNAL MEDICINE CLINIC | Age: 60
End: 2020-11-09

## 2020-11-09 NOTE — TELEPHONE ENCOUNTER
----- Message from Tara Palacios sent at 11/9/2020  3:33 PM EST -----  Regarding: Dr. Iggy Duff first and last name and relationship (if not the patient): n/a  Best contact number(s): [phone]  Whose call is being returned: Richelle Marie? ?  Details to clarify the request: Mr. Lulu Lambert is returning call to Richelle Marie to advise his Rx is still not ready for pic up.

## 2020-11-10 RX ORDER — ZOLPIDEM TARTRATE 12.5 MG/1
TABLET, FILM COATED, EXTENDED RELEASE ORAL
Qty: 15 TAB | Refills: 0 | Status: SHIPPED | OUTPATIENT
Start: 2020-11-10 | End: 2021-01-11 | Stop reason: SDUPTHER

## 2020-11-11 ENCOUNTER — VIRTUAL VISIT (OUTPATIENT)
Dept: INTERNAL MEDICINE CLINIC | Age: 60
End: 2020-11-11
Payer: COMMERCIAL

## 2020-11-11 DIAGNOSIS — E66.9 OBESITY (BMI 30-39.9): ICD-10-CM

## 2020-11-11 DIAGNOSIS — G47.00 INSOMNIA, UNSPECIFIED TYPE: Primary | ICD-10-CM

## 2020-11-11 DIAGNOSIS — I10 ESSENTIAL HYPERTENSION, BENIGN: ICD-10-CM

## 2020-11-11 DIAGNOSIS — F43.9 STRESS: ICD-10-CM

## 2020-11-11 PROCEDURE — 99214 OFFICE O/P EST MOD 30 MIN: CPT | Performed by: INTERNAL MEDICINE

## 2020-11-11 RX ORDER — HYDROXYZINE HYDROCHLORIDE 10 MG/1
10 TABLET, FILM COATED ORAL
Qty: 30 TAB | Refills: 2 | Status: SHIPPED | OUTPATIENT
Start: 2020-11-11 | End: 2020-12-11

## 2020-11-11 NOTE — PATIENT INSTRUCTIONS
Office Policies Phone calls/patient messages: Please allow up to 24 hours for someone in the office to contact you about your call or message. Be mindful your provider may be out of the office or your message may require further review. We encourage you to use [facility] for your messages as this is a faster, more efficient way to communicate with our office Medication Refills: 
         
Prescription medications require 48-72 business hours to process. We encourage you to use [facility] for your refills. For controlled medications: Please allow 72 business hours to process. Certain medications may require you to  a written prescription at our office. NO narcotic/controlled medications will be prescribed after 4pm Monday through Friday or on weekends Form/Paperwork Completion: 
         
Please note a $25 fee may incur for all paperwork for completed by our providers. We ask that you allow 7-10 business days. Pre-payment is due prior to picking up/faxing the completed form. You may also download your forms to [facility] to have your doctor print off.

## 2020-11-11 NOTE — PROGRESS NOTES
Amado Feldman. is a 64 y.o. male who was seen by synchronous (real-time) audio-video technology on 11/11/2020 for Follow-up and Insomnia (stress)        Assessment & Plan:   Diagnoses and all orders for this visit:    1. Insomnia, unspecified type    He thinks the days he is working, he gets so stressed out and walked out that he is not able to fall asleep. He is using Ambien those days. Believe he is depressed or anxious. I have advised him only and use the Ambien when he is too bad. Otherwise he should use hydroxyzine. We will give a prescription.  -     hydrOXYzine HCL (ATARAX) 10 mg tablet; Take 1 Tab by mouth nightly as needed for Sleep for up to 30 days. #30 with 1 refill. Follow-up in 3 months. 2. Stress    Lot of stress from work. We will try,  -     hydrOXYzine HCL (ATARAX) 10 mg tablet; Take 1 Tab by mouth nightly as needed for Sleep for up to 30 days. 3. Essential hypertension, benign  Stable blood pressure. On hydrochlorothiazide. CMP was stable. Follow-up in 3 months. 4. Obesity (BMI 30-39. 9)    On 1800-calorie diet. Advised him to be on keto diet and exercise. I spent at least 25 minutes on this visit with this established patient. Subjective:     Mr. César Florez is here for follow-up. He reports insomnia. He has been asking Ambien more often. He mentioned that the days he is working, he is so stressed out that he cannot fall asleep, he is using Ambien those days. He does not believe he is anxious or depressed. He is very positive person. Snoring has minimized since he has lost some weight. He did not see a sleep specialist.  Has hypertension, compliant with medicine. Denies chest pain palpitation or shortness of breath. Labs reviewed, but stable. Colonoscopy done, was normal.  Need flu shot. Prior to Admission medications    Medication Sig Start Date End Date Taking?  Authorizing Provider   hydrOXYzine HCL (ATARAX) 10 mg tablet Take 1 Tab by mouth nightly as needed for Sleep for up to 30 days. 11/11/20 12/11/20 Yes Jennie Pratt MD   zolpidem CR (AMBIEN CR) 12.5 mg tablet TAKE 1 TABLET BY MOUTH ONCE DAILY AT BEDTIME AS NEEDED FOR SLEEP 11/10/20  Yes Jennie Pratt MD   naproxen (NAPROSYN) 500 mg tablet Take 1 Tab by mouth daily as needed for Pain. 10/16/20  Yes Jennie Pratt MD   nystatin (MYCOSTATIN) topical cream Apply  to affected area two (2) times a day. Apply top BID 7/13/20  Yes Jennie Pratt MD   hydroCHLOROthiazide (HYDRODIURIL) 12.5 mg tablet Take 1 tablet by mouth once daily 7/8/20  Yes Jennie Pratt MD   valsartan (DIOVAN) 160 mg tablet TAKE 1 TABLET BY MOUTH EVERY DAY 5/26/20  Yes Jennie Pratt MD   atorvastatin (LIPITOR) 20 mg tablet Take 1 Tab by mouth daily. 5/21/20  Yes Jennie Pratt MD   baclofen (LIORESAL) 10 mg tablet Take 1 Tab by mouth two (2) times daily as needed for Muscle Spasm(s). 5/4/20  Yes Jennie Pratt MD   cholecalciferol, vitamin D3, (VITAMIN D3) 2,000 unit tab Take  by mouth. Yes Provider, Historical   omeprazole (PRILOSEC) 40 mg capsule TAKE 1 CAP BY MOUTH DAILY AS NEEDED. 8/12/19  Yes Jennie Pratt MD   glucosamine sulfate 500 mg capsule Take  by mouth three (3) times daily. Yes Provider, Historical   Omega-3-DHA-EPA-Fish Oil 1,000 mg (120 mg-180 mg) cap Take 2 Tabs by mouth two (2) times a day. 3/8/17  Yes Newton Kruse PA   niacin-inositol (NIACIN FLUSH FREE) 400 mg niacin (500 mg) cap Take 1 Cap by mouth daily. 3/14/16  Yes Newton Kruse PA   selenium 200 mcg Tab Take 400 mcg by mouth daily. Yes Provider, Historical   ascorbic acid (VITAMIN C) 500 mg tablet Take  by mouth. Yes Provider, Historical   pyridoxine (VITAMIN B-6) 100 mg tablet Take 100 mg by mouth every other day. Yes Provider, Historical   aspirin 81 mg chewable tablet Take 1 tablet by mouth daily.     RONA Napoles     Past Medical History:   Diagnosis Date    CAD (coronary artery disease) 2016    mild; calcium scoring score=46    GERD (gastroesophageal reflux disease)     Hypertension     Mixed hyperlipidemia     LDL goal <70    Prediabetes 09/2016    Rectal polyp 11/10/14    Rotator cuff tear, left     Dr. Kathy Samuels Sleep disturbance        ROS insomnia and stress. Objective:     Patient-Reported Vitals 11/11/2020   Patient-Reported Weight 212 lbs   Patient-Reported Systolic  745   Patient-Reported Diastolic 74            Constitutional: [x] Appears well-developed and well-nourished [x] No apparent distress      [] Abnormal -     Mental status: [x] Alert and awake  [x] Oriented to person/place/time [x] Able to follow commands    [] Abnormal -         HENT: [x] Normocephalic, atraumatic  [] Abnormal -   [x] Mouth/Throat: Mucous membranes are moist    External Ears [x] Normal  [] Abnormal -    Neck: [x] No visualized mass [] Abnormal -     Pulmonary/Chest: [x] Respiratory effort normal   [x] No visualized signs of difficulty breathing or respiratory distress        [] Abnormal -      Musculoskeletal:   [x] Normal gait with no signs of ataxia         [x] Normal range of motion of neck        [] Abnormal -     Neurological:        [x] No Facial Asymmetry (Cranial nerve 7 motor function) (limited exam due to video visit)          [x] No gaze palsy        [] Abnormal -                   Psychiatric:       [x] Normal Affect [] Abnormal -        [x] No Hallucinations  Stressed,not depressed. Other pertinent observable physical exam findings:-        We discussed the expected course, resolution and complications of the diagnosis(es) in detail. Medication risks, benefits, costs, interactions, and alternatives were discussed as indicated. I advised him to contact the office if his condition worsens, changes or fails to improve as anticipated. He expressed understanding with the diagnosis(es) and plan.        Sangeeta Ireland., who was evaluated through a patient-initiated, synchronous (real-time) audio-video encounter, and/or his healthcare decision maker, is aware that it is a billable service, with coverage as determined by his insurance carrier. He provided verbal consent to proceed: Yes, and patient identification was verified. It was conducted pursuant to the emergency declaration under the [address] [place] [address] and the [employer] and [employer] General Act. A caregiver was present when appropriate. Ability to conduct physical exam was limited. I was in the office. The patient was at home.       Elsy Vu MD

## 2020-11-23 DIAGNOSIS — K21.9 GASTROESOPHAGEAL REFLUX DISEASE WITHOUT ESOPHAGITIS: ICD-10-CM

## 2020-11-23 RX ORDER — OMEPRAZOLE 40 MG/1
CAPSULE, DELAYED RELEASE ORAL
Qty: 30 CAP | Refills: 2 | Status: SHIPPED | OUTPATIENT
Start: 2020-11-23 | End: 2022-03-28

## 2020-11-23 NOTE — TELEPHONE ENCOUNTER
Requested Prescriptions     Pending Prescriptions Disp Refills    omeprazole (PRILOSEC) 40 mg capsule 30 Cap 2     11/11/2020 02/11/2021  [facility] on file

## 2020-12-14 ENCOUNTER — OFFICE VISIT (OUTPATIENT)
Dept: URGENT CARE | Age: 60
End: 2020-12-14
Payer: COMMERCIAL

## 2020-12-14 VITALS — HEART RATE: 64 BPM | TEMPERATURE: 98.5 F | RESPIRATION RATE: 15 BRPM | OXYGEN SATURATION: 95 %

## 2020-12-14 DIAGNOSIS — Z20.822 ENCOUNTER FOR LABORATORY TESTING FOR COVID-19 VIRUS: Primary | ICD-10-CM

## 2020-12-14 PROCEDURE — 99212 OFFICE O/P EST SF 10 MIN: CPT | Performed by: NURSE PRACTITIONER

## 2020-12-14 NOTE — PROGRESS NOTES
Subjective: (As above and below)       This patient was seen in [facility] at [address] [place] [address] [facility]gent [facility] while in their vehicle due to COVID-19 pandemic with PPE and focused examination in order to decrease community viral transmission. The patient/guardian gave verbal consent to treat. Chief Complaint   Patient presents with    Covid Testing     Pt needs COVID 19 test for travel. Pt denies all symptoms and exposure to [place]. is a 64 y.o. male who presents for COVID-19 testing  Required for: travel  Feels well otherwise. Review of Systems - negative except as listed above    Reviewed PmHx, RxHx, FmHx, SocHx, AllgHx and updated in chart. Family History   Problem Relation Age of Onset    Heart Disease Father         CAD, age 72    Diabetes Father     Cancer Father         lung--smoker    Cancer Paternal Uncle         lung - smoker       Past Medical History:   Diagnosis Date    CAD (coronary artery disease) 2016    mild; calcium scoring score=46    GERD (gastroesophageal reflux disease)     Hypertension     Mixed hyperlipidemia     LDL goal <70    Prediabetes 09/2016    Rectal polyp 11/10/14    Rotator cuff tear, left     Dr. Salvador Phillip Sleep disturbance       Social History     Socioeconomic History    Marital status: SINGLE     Spouse name: single    Number of children: 2    Years of education: Not on file    Highest education level: Not on file   Occupational History    Occupation: [employer]--hourly    Tobacco Use    Smoking status: Never Smoker    Smokeless tobacco: Never Used   Substance and Sexual Activity    Alcohol use:  Yes     Alcohol/week: 3.0 - 5.0 standard drinks     Types: 3 - 5 Standard drinks or equivalent per week     Comment: social    Drug use: No    Sexual activity: Yes     Partners: Female     Comment: single,2 daughters,working in [employer]          Current Outpatient Medications   Medication Sig    omeprazole (PRILOSEC) 40 mg capsule TAKE 1 CAP BY MOUTH DAILY AS NEEDED.  zolpidem CR (AMBIEN CR) 12.5 mg tablet TAKE 1 TABLET BY MOUTH ONCE DAILY AT BEDTIME AS NEEDED FOR SLEEP    naproxen (NAPROSYN) 500 mg tablet Take 1 Tab by mouth daily as needed for Pain.  nystatin (MYCOSTATIN) topical cream Apply  to affected area two (2) times a day. Apply top BID    hydroCHLOROthiazide (HYDRODIURIL) 12.5 mg tablet Take 1 tablet by mouth once daily    valsartan (DIOVAN) 160 mg tablet TAKE 1 TABLET BY MOUTH EVERY DAY    atorvastatin (LIPITOR) 20 mg tablet Take 1 Tab by mouth daily.  baclofen (LIORESAL) 10 mg tablet Take 1 Tab by mouth two (2) times daily as needed for Muscle Spasm(s).  cholecalciferol, vitamin D3, (VITAMIN D3) 2,000 unit tab Take  by mouth.  glucosamine sulfate 500 mg capsule Take  by mouth three (3) times daily.  Omega-3-DHA-EPA-Fish Oil 1,000 mg (120 mg-180 mg) cap Take 2 Tabs by mouth two (2) times a day.  niacin-inositol (NIACIN FLUSH FREE) 400 mg niacin (500 mg) cap Take 1 Cap by mouth daily.  selenium 200 mcg Tab Take 400 mcg by mouth daily.  ascorbic acid (VITAMIN C) 500 mg tablet Take  by mouth.  pyridoxine (VITAMIN B-6) 100 mg tablet Take 100 mg by mouth every other day. No current facility-administered medications for this visit. Objective:     Vitals:    12/14/20 1528   Pulse: 64   Resp: 15   Temp: 98.5 °F (36.9 °C)   SpO2: 95%       Physical Exam  General appearance  appears well hydrated and does not appear toxic, no acute distress  Eyes - EOMs intact. Non injected. No scleral icterus   Ears - no external swelling  Nose - No purulent drainage  Chest - normal breathing effort. No active cough heard. No audible wheezing. Heart - HR-see vitals  Skin - no observable rashes or pallor  Neurologic- alert and oriented x 3  Psychiatric- normal mood, behavior and though content. Assessment/ Plan:     1.  Encounter for laboratory testing for COVID-19 virus    - NOVEL CORONAVIRUS (COVID-19)      Choco Shields, NP

## 2020-12-16 LAB — SARS-COV-2, NAA: NOT DETECTED

## 2020-12-24 ENCOUNTER — TELEPHONE (OUTPATIENT)
Dept: INTERNAL MEDICINE CLINIC | Age: 60
End: 2020-12-24

## 2020-12-24 NOTE — TELEPHONE ENCOUNTER
Patient called stating that he is having some issues taking the hydroxyzine. He would like a call back to discuss. Please call him back at [phone]

## 2020-12-28 NOTE — TELEPHONE ENCOUNTER
Call placed to patient and he states tried x 2 will continued feeling of lethargy into the day, so he just wanted to let Dr Raquel Deluca know he is not going to take anymore

## 2021-01-11 DIAGNOSIS — F51.01 PRIMARY INSOMNIA: ICD-10-CM

## 2021-01-11 NOTE — TELEPHONE ENCOUNTER
Requested Prescriptions     Pending Prescriptions Disp Refills    zolpidem CR (AMBIEN CR) 12.5 mg tablet 15 Tab 0     Sig: TAKE 1 TABLET BY MOUTH ONCE DAILY AT BEDTIME AS NEEDED FOR SLEEP     Patient says that he has adverse affects from the other sleep medication he was prescribed. He wants to know if the zolpidem can be refilled. She says that he only gets 15 tabs and has been able to make that last two months.   11/11/2020 02/11/2021  [facility] on file

## 2021-01-12 RX ORDER — ZOLPIDEM TARTRATE 12.5 MG/1
TABLET, FILM COATED, EXTENDED RELEASE ORAL
Qty: 15 TAB | Refills: 0 | Status: SHIPPED | OUTPATIENT
Start: 2021-01-12 | End: 2021-02-11 | Stop reason: SDUPTHER

## 2021-02-11 ENCOUNTER — VIRTUAL VISIT (OUTPATIENT)
Dept: INTERNAL MEDICINE CLINIC | Age: 61
End: 2021-02-11
Payer: COMMERCIAL

## 2021-02-11 DIAGNOSIS — R73.01 IMPAIRED FASTING GLUCOSE: ICD-10-CM

## 2021-02-11 DIAGNOSIS — T63.444D BEE STING, UNDETERMINED INTENT, SUBSEQUENT ENCOUNTER: ICD-10-CM

## 2021-02-11 DIAGNOSIS — I10 ESSENTIAL HYPERTENSION, BENIGN: ICD-10-CM

## 2021-02-11 DIAGNOSIS — E66.9 OBESITY (BMI 30-39.9): ICD-10-CM

## 2021-02-11 DIAGNOSIS — I10 ESSENTIAL HYPERTENSION, BENIGN: Primary | ICD-10-CM

## 2021-02-11 DIAGNOSIS — Z12.5 SCREENING FOR MALIGNANT NEOPLASM OF PROSTATE: ICD-10-CM

## 2021-02-11 DIAGNOSIS — R06.83 SNORING: ICD-10-CM

## 2021-02-11 DIAGNOSIS — F51.01 PRIMARY INSOMNIA: ICD-10-CM

## 2021-02-11 DIAGNOSIS — G47.00 INSOMNIA, UNSPECIFIED TYPE: ICD-10-CM

## 2021-02-11 PROCEDURE — 99214 OFFICE O/P EST MOD 30 MIN: CPT | Performed by: INTERNAL MEDICINE

## 2021-02-11 RX ORDER — EPINEPHRINE 0.3 MG/.3ML
0.3 INJECTION SUBCUTANEOUS
Qty: 1 SYRINGE | Refills: 1 | Status: SHIPPED | OUTPATIENT
Start: 2021-02-11 | End: 2021-02-11

## 2021-02-11 RX ORDER — ZOLPIDEM TARTRATE 12.5 MG/1
TABLET, FILM COATED, EXTENDED RELEASE ORAL
Qty: 20 TAB | Refills: 0 | Status: SHIPPED | OUTPATIENT
Start: 2021-02-11 | End: 2021-05-27 | Stop reason: SDUPTHER

## 2021-02-11 RX ORDER — TRAZODONE HYDROCHLORIDE 50 MG/1
50 TABLET ORAL
Qty: 30 TAB | Refills: 3 | Status: SHIPPED | OUTPATIENT
Start: 2021-02-11 | End: 2021-07-26 | Stop reason: SDUPTHER

## 2021-02-11 NOTE — PROGRESS NOTES
Eugenia Whitaker. is a 64 y.o. male who was seen by synchronous (real-time) audio-video technology on 2/11/2021 for Cholesterol Problem (3 month follow up), Hypertension, and Insomnia        Assessment & Plan:   Diagnoses and all orders for this visit:    1. Essential hypertension, benign    Stable blood pressure. On hydrochlorothiazide and valsartan. Will check,  -     CBC WITH AUTOMATED DIFF; Future  -     METABOLIC PANEL, COMPREHENSIVE; Future    2. Insomnia, unspecified type    Insomnia is a big problem. Hydroxyzine was making him too groggy. He was not able to tolerate it. I will discontinue hydroxyzine. Will try,  -     traZODone (DESYREL) 50 mg tablet; Take 1 Tab by mouth nightly. We will give Ambien #20 tablets. Follow-up in 3 months. 3. Impaired fasting glucose  Be on low-carb diet and exercise. 4. Snoring    We will refer,  -     SLEEP MEDICINE REFERRAL    5. Primary insomnia  -     zolpidem CR (AMBIEN CR) 12.5 mg tablet; TAKE 1 TABLET BY MOUTH ONCE DAILY AT BEDTIME AS NEEDED FOR SLEEP #20, no refill. 6. Bee sting, undetermined intent, subsequent encounter    We will refill,  -     EPINEPHrine (EPIPEN) 0.3 mg/0.3 mL injection; 0.3 mL by IntraMUSCular route once as needed for Allergic Response for up to 1 dose. 7. Obesity (BMI 30-39.9)  -     HEMOGLOBIN A1C WITH EAG; Future    8. Screening for malignant neoplasm of prostate  -     PSA, DIAGNOSTIC (PROSTATE SPECIFIC AG); Future        I spent at least 30 minutes on this visit with this established patient. Subjective:     Mr. Deloris Hennessy is here for follow-up. Has hypertension, compliant with medicine. Denies chest pain palpitation shortness of breath. He has is in insomnia. Not able to maintain sleep. Tried hydroxyzine, made him very groggy. He is not able to function next day. Stopped taking it. He takes Ambien some days, helps him a lot. Would like to get a refill. Has prediabetes, watching carbohydrate.   Has gained few pound weight. Working from home, not able to exercise much due to pandemic. Has hyperlipidemia, on statin. Denies  Myalgia. He is snoring a lot, never had a sleep study done. Labs done recently, all discussed with him. Prior to Admission medications    Medication Sig Start Date End Date Taking? Authorizing Provider   traZODone (DESYREL) 50 mg tablet Take 1 Tab by mouth nightly. 2/11/21  Yes Brad Merida MD   zolpidem CR (AMBIEN CR) 12.5 mg tablet TAKE 1 TABLET BY MOUTH ONCE DAILY AT BEDTIME AS NEEDED FOR SLEEP 2/11/21  Yes Brad Merida MD   EPINEPHrine (EPIPEN) 0.3 mg/0.3 mL injection 0.3 mL by IntraMUSCular route once as needed for Allergic Response for up to 1 dose. 2/11/21 2/11/21 Yes Brad Merida MD   valsartan (DIOVAN) 160 mg tablet TAKE 1 TABLET BY MOUTH ONCE DAILY (DISCONTINUE FOR LOSARTAN) 2/8/21  Yes Brad Merida MD   hydroCHLOROthiazide (HYDRODIURIL) 12.5 mg tablet Take 1 tablet by mouth once daily 12/24/20  Yes Brad Merida MD   atorvastatin (LIPITOR) 20 mg tablet Take 1 tablet by mouth once daily 12/24/20  Yes Brad Merida MD   omeprazole (PRILOSEC) 40 mg capsule TAKE 1 CAP BY MOUTH DAILY AS NEEDED. 11/23/20  Yes Brad Merida MD   naproxen (NAPROSYN) 500 mg tablet Take 1 Tab by mouth daily as needed for Pain. 10/16/20  Yes Brad Merida MD   nystatin (MYCOSTATIN) topical cream Apply  to affected area two (2) times a day. Apply top BID 7/13/20  Yes Brad Merida MD   baclofen (LIORESAL) 10 mg tablet Take 1 Tab by mouth two (2) times daily as needed for Muscle Spasm(s). 5/4/20  Yes Brad Merida MD   cholecalciferol, vitamin D3, (VITAMIN D3) 2,000 unit tab Take  by mouth. Yes Provider, Historical   glucosamine sulfate 500 mg capsule Take  by mouth three (3) times daily. Yes Provider, Historical   Omega-3-DHA-EPA-Fish Oil 1,000 mg (120 mg-180 mg) cap Take 2 Tabs by mouth two (2) times a day.  3/8/17  Yes Kerry Kruse PA   niacin-inositol (NIACIN FLUSH FREE) 400 mg niacin (500 mg) cap Take 1 Cap by mouth daily. 3/14/16  Yes Kris Kruse PA   selenium 200 mcg Tab Take 400 mcg by mouth daily. Yes Provider, Historical   ascorbic acid (VITAMIN C) 500 mg tablet Take  by mouth. Yes Provider, Historical   pyridoxine (VITAMIN B-6) 100 mg tablet Take 100 mg by mouth every other day.    Yes Provider, Historical   zolpidem CR (AMBIEN CR) 12.5 mg tablet TAKE 1 TABLET BY MOUTH ONCE DAILY AT BEDTIME AS NEEDED FOR SLEEP 1/12/21 2/11/21  Pool Murphy MD     Past Medical History:   Diagnosis Date    CAD (coronary artery disease) 2016    mild; calcium scoring score=46    GERD (gastroesophageal reflux disease)     Hypertension     Mixed hyperlipidemia     LDL goal <70    Prediabetes 09/2016    Rectal polyp 11/10/14    Rotator cuff tear, left     Dr. Yosef Whitten Sleep disturbance        ROS    Objective:     Patient-Reported Vitals 2/11/2021   Patient-Reported Weight 214lb   Patient-Reported Systolic  780   Patient-Reported Diastolic 83            Constitutional: [x] Appears well-developed and well-nourished [x] No apparent distress      [] Abnormal -     Mental status: [x] Alert and awake  [x] Oriented to person/place/time [x] Able to follow commands    [] Abnormal -       HENT: [x] Normocephalic, atraumatic  [] Abnormal -   [x] Mouth/Throat: Mucous membranes are moist    External Ears [x] Normal  [] Abnormal -    Neck: [x] No visualized mass [] Abnormal -     Pulmonary/Chest: [x] Respiratory effort normal   [x] No visualized signs of difficulty breathing or respiratory distress        [] Abnormal -      Musculoskeletal:   [x] Normal gait with no signs of ataxia         [x] Normal range of motion of neck        [] Abnormal -     Neurological:        [x] No Facial Asymmetry (Cranial nerve 7 motor function) (limited exam due to video visit)          [x] No gaze palsy        [] Abnormal -          Skin:        [x] No significant exanthematous lesions or discoloration noted on facial skin         [] Abnormal - Psychiatric:       [x] Normal Affect [] Abnormal -        [x] No Hallucinations    Other pertinent observable physical exam findings:-        We discussed the expected course, resolution and complications of the diagnosis(es) in detail. Medication risks, benefits, costs, interactions, and alternatives were discussed as indicated. I advised him to contact the office if his condition worsens, changes or fails to improve as anticipated. He expressed understanding with the diagnosis(es) and plan. Davis Primo, who was evaluated through a patient-initiated, synchronous (real-time) audio-video encounter, and/or his healthcare decision maker, is aware that it is a billable service, with coverage as determined by his insurance carrier. He provided verbal consent to proceed: Yes, and patient identification was verified. It was conducted pursuant to the emergency declaration under the [address] [place] [address], [address] [place] [address] authority and the [employer] and [employer] Act. A caregiver was present when appropriate. Ability to conduct physical exam was limited. I was in the office. The patient was at home.       Miguel A Burnette MD

## 2021-02-11 NOTE — PROGRESS NOTES
Health Maintenance Due   Topic Date Due    COVID-19 Vaccine (1 of 2) 07[birth year]28/1978    Shingrix Vaccine Age 50> (1 of 2) 07/28/2012    Colorectal Cancer Screening Combo  11/10/2019    Flu Vaccine (1) 09/01/2020       Chief Complaint   Patient presents with    Cholesterol Problem     3 month follow up    Hypertension    Insomnia       1. Have you been to the ER, urgent care clinic since your last visit? Hospitalized since your last visit? No    2. Have you seen or consulted any other health care providers outside of the [address] [place] [address] since your last visit? Include any pap smears or colon screening. No    3) Do you have an Advance Directive on file? no    4) Are you interested in receiving information on Advance Directives? NO      Patient is accompanied by self I have received verbal consent from Manolo Barrera. to discuss any/all medical information while they are present in the room.

## 2021-03-10 LAB
ALBUMIN SERPL-MCNC: 4.4 G/DL (ref 3.8–4.9)
ALBUMIN/GLOB SERPL: 1.9 {RATIO} (ref 1.2–2.2)
ALP SERPL-CCNC: 61 IU/L (ref 39–117)
ALT SERPL-CCNC: 42 IU/L (ref 0–44)
AST SERPL-CCNC: 27 IU/L (ref 0–40)
BASOPHILS # BLD AUTO: 0 X10E3/UL (ref 0–0.2)
BASOPHILS NFR BLD AUTO: 1 %
BILIRUB SERPL-MCNC: 0.6 MG/DL (ref 0–1.2)
BUN SERPL-MCNC: 13 MG/DL (ref 6–24)
BUN/CREAT SERPL: 15 (ref 9–20)
CALCIUM SERPL-MCNC: 9.2 MG/DL (ref 8.7–10.2)
CHLORIDE SERPL-SCNC: 103 MMOL/L (ref 96–106)
CO2 SERPL-SCNC: 25 MMOL/L (ref 20–29)
CREAT SERPL-MCNC: 0.85 MG/DL (ref 0.76–1.27)
EOSINOPHIL # BLD AUTO: 0.2 X10E3/UL (ref 0–0.4)
EOSINOPHIL NFR BLD AUTO: 4 %
ERYTHROCYTE [DISTWIDTH] IN BLOOD BY AUTOMATED COUNT: 13 % (ref 11.6–15.4)
EST. AVERAGE GLUCOSE BLD GHB EST-MCNC: 111 MG/DL
GLOBULIN SER CALC-MCNC: 2.3 G/DL (ref 1.5–4.5)
GLUCOSE SERPL-MCNC: 121 MG/DL (ref 65–99)
HBA1C MFR BLD: 5.5 % (ref 4.8–5.6)
HCT VFR BLD AUTO: 42 % (ref 37.5–51)
HGB BLD-MCNC: 14.5 G/DL (ref 13–17.7)
IMM GRANULOCYTES # BLD AUTO: 0 X10E3/UL (ref 0–0.1)
IMM GRANULOCYTES NFR BLD AUTO: 0 %
LYMPHOCYTES # BLD AUTO: 1.3 X10E3/UL (ref 0.7–3.1)
LYMPHOCYTES NFR BLD AUTO: 24 %
MCH RBC QN AUTO: 33.1 PG (ref 26.6–33)
MCHC RBC AUTO-ENTMCNC: 34.5 G/DL (ref 31.5–35.7)
MCV RBC AUTO: 96 FL (ref 79–97)
MONOCYTES # BLD AUTO: 0.5 X10E3/UL (ref 0.1–0.9)
MONOCYTES NFR BLD AUTO: 8 %
NEUTROPHILS # BLD AUTO: 3.5 X10E3/UL (ref 1.4–7)
NEUTROPHILS NFR BLD AUTO: 63 %
PLATELET # BLD AUTO: 158 X10E3/UL (ref 150–450)
POTASSIUM SERPL-SCNC: 4.1 MMOL/L (ref 3.5–5.2)
PROT SERPL-MCNC: 6.7 G/DL (ref 6–8.5)
PSA SERPL-MCNC: 1.3 NG/ML (ref 0–4)
RBC # BLD AUTO: 4.38 X10E6/UL (ref 4.14–5.8)
SODIUM SERPL-SCNC: 141 MMOL/L (ref 134–144)
WBC # BLD AUTO: 5.5 X10E3/UL (ref 3.4–10.8)

## 2021-05-27 DIAGNOSIS — F51.01 PRIMARY INSOMNIA: ICD-10-CM

## 2021-05-27 RX ORDER — ZOLPIDEM TARTRATE 12.5 MG/1
TABLET, FILM COATED, EXTENDED RELEASE ORAL
Qty: 20 TABLET | Refills: 0 | Status: SHIPPED | OUTPATIENT
Start: 2021-05-27 | End: 2021-07-26 | Stop reason: SDUPTHER

## 2021-06-14 DIAGNOSIS — B36.0 MALASSEZIA FURFUR CAUSING TINEA: ICD-10-CM

## 2021-06-14 RX ORDER — NYSTATIN 100000 U/G
CREAM TOPICAL
Qty: 15 G | Refills: 0 | Status: SHIPPED | OUTPATIENT
Start: 2021-06-14 | End: 2021-06-18

## 2021-06-15 ENCOUNTER — OFFICE VISIT (OUTPATIENT)
Dept: INTERNAL MEDICINE CLINIC | Age: 61
End: 2021-06-15
Payer: COMMERCIAL

## 2021-06-15 VITALS
SYSTOLIC BLOOD PRESSURE: 140 MMHG | WEIGHT: 213 LBS | TEMPERATURE: 98.5 F | HEIGHT: 69 IN | HEART RATE: 79 BPM | BODY MASS INDEX: 31.55 KG/M2 | DIASTOLIC BLOOD PRESSURE: 74 MMHG | RESPIRATION RATE: 16 BRPM | OXYGEN SATURATION: 96 %

## 2021-06-15 DIAGNOSIS — L85.3 DRY SKIN DERMATITIS: Primary | ICD-10-CM

## 2021-06-15 PROCEDURE — 99213 OFFICE O/P EST LOW 20 MIN: CPT | Performed by: INTERNAL MEDICINE

## 2021-06-15 RX ORDER — CRISABOROLE 20 MG/G
60 OINTMENT TOPICAL 2 TIMES DAILY
Qty: 1 TUBE | Refills: 0 | Status: SHIPPED | OUTPATIENT
Start: 2021-06-15 | End: 2021-09-22

## 2021-06-15 NOTE — PROGRESS NOTES
Health Maintenance Due   Topic Date Due    Shingrix Vaccine Age 49> (1 of 2) Never done    Colorectal Cancer Screening Combo  11/10/2019       Chief Complaint   Patient presents with    Skin Problem    Dry Skin    Other     Cracked skin around fingers       1. Have you been to the ER, urgent care clinic since your last visit? Hospitalized since your last visit? No    2. Have you seen or consulted any other health care providers outside of the [address] [place] [address] since your last visit? Include any pap smears or colon screening. No    3) Do you have an Advance Directive on file? no    4) Are you interested in receiving information on Advance Directives? NO      Patient is accompanied by self I have received verbal consent from Homero Corral. to discuss any/all medical information while they are present in the room.

## 2021-06-15 NOTE — PROGRESS NOTES
HISTORY OF PRESENT ILLNESS  Nilda Johnson is a 64 y.o. male. HPI Patient reports having dry cracked skin that got painful, mostly located on his finger tips. He tried multiple creams in the past without results. He also reports itchy skin on his back, which upon exam appeared dry. Lately he used his friends cream Eucrisa that had good result. He is here to request for a prescription for the same. Reports no fever, chills or changes in products. Does see dermatology regularly. Family History   Problem Relation Age of Onset    Heart Disease Father         CAD, age 72    Diabetes Father     Cancer Father         lung--smoker    Cancer Paternal Uncle         lung - smoker       Outpatient Encounter Medications as of 6/15/2021   Medication Sig Dispense Refill    crisaborole (Eucrisa) 2 % oint 60 g by Apply Externally route two (2) times a day. 1 Tube 0    nystatin (MYCOSTATIN) topical cream APPLY  CREAM TOPICALLY TWICE DAILY TO THE AFFECTED AREA 15 g 0    zolpidem CR (AMBIEN CR) 12.5 mg tablet TAKE 1 TABLET BY MOUTH ONCE DAILY AT BEDTIME AS NEEDED FOR SLEEP 20 Tablet 0    valsartan (DIOVAN) 160 mg tablet TAKE 1 TABLET BY MOUTH ONCE DAILY (DISCONTINUE LOSARTAN) 30 Tablet 2    traZODone (DESYREL) 50 mg tablet Take 1 Tab by mouth nightly. 30 Tab 3    hydroCHLOROthiazide (HYDRODIURIL) 12.5 mg tablet Take 1 tablet by mouth once daily 90 Tab 1    atorvastatin (LIPITOR) 20 mg tablet Take 1 tablet by mouth once daily 90 Tab 1    omeprazole (PRILOSEC) 40 mg capsule TAKE 1 CAP BY MOUTH DAILY AS NEEDED. 30 Cap 2    naproxen (NAPROSYN) 500 mg tablet Take 1 Tab by mouth daily as needed for Pain. 30 Tab 0    baclofen (LIORESAL) 10 mg tablet Take 1 Tab by mouth two (2) times daily as needed for Muscle Spasm(s). 30 Tab 0    cholecalciferol, vitamin D3, (VITAMIN D3) 2,000 unit tab Take  by mouth.  glucosamine sulfate 500 mg capsule Take  by mouth three (3) times daily.       Omega-3-DHA-EPA-Fish Oil 1,000 mg (120 mg-180 mg) cap Take 2 Tabs by mouth two (2) times a day. 1 Cap 0    niacin-inositol (NIACIN FLUSH FREE) 400 mg niacin (500 mg) cap Take 1 Cap by mouth daily.  selenium 200 mcg Tab Take 400 mcg by mouth daily.  ascorbic acid (VITAMIN C) 500 mg tablet Take  by mouth.  pyridoxine (VITAMIN B-6) 100 mg tablet Take 100 mg by mouth every other day. No facility-administered encounter medications on file as of 6/15/2021. Past Surgical History:   Procedure Laterality Date    HX COLONOSCOPY  11/10/14    +rectal polyp (hyperplastic), Dr. Washburn Floor HX KNEE ARTHROSCOPY Right 2006    HX MENISCUS REPAIR Right 06/21/2016    Dr. Elo Stroud Right 2008    HX ROTATOR CUFF REPAIR  09/2016    Dr. Thaddeus Guerrero       Past Medical History:   Diagnosis Date    CAD (coronary artery disease) 2016    mild; calcium scoring score=46    GERD (gastroesophageal reflux disease)     Hypertension     Mixed hyperlipidemia     LDL goal <70    Prediabetes 09/2016    Rectal polyp 11/10/14    Rotator cuff tear, left     Dr. Elaina Quintana Sleep disturbance        Visit Vitals  BP (!) 140/74 (BP 1 Location: Left upper arm, BP Patient Position: Sitting, BP Cuff Size: Adult)   Pulse 79   Temp 98.5 °F (36.9 °C) (Oral)   Resp 16   Ht 5' 9\" (1.753 m)   Wt 213 lb (96.6 kg)   SpO2 96%   BMI 31.45 kg/m²     Review of Systems   Reason unable to perform ROS: Patient has overall dry skin, he sees a dermatologist yearly. Respiratory: Negative. Cardiovascular: Negative. Skin: Negative. Physical Exam  Cardiovascular:      Rate and Rhythm: Normal rate and regular rhythm. Pulmonary:      Effort: Pulmonary effort is normal.      Breath sounds: Normal breath sounds. Skin:     General: Skin is warm and dry. ASSESSMENT and PLAN  Diagnoses and all orders for this visit:    1.  Dry skin dermatitis  -     crisaborole (Eucrisa) 2 % oint; 60 g by Apply Externally route two (2) times a day.      Follow-up and Dispositions    · Return if symptoms worsen or fail to improve, for Follow up. reviewed medications and side effects in detail. Educated on keeping the cracked site clean and moisturized. Use moisturizer for dry skin consistently.

## 2021-06-18 ENCOUNTER — TELEPHONE (OUTPATIENT)
Dept: INTERNAL MEDICINE CLINIC | Age: 61
End: 2021-06-18

## 2021-06-21 NOTE — TELEPHONE ENCOUNTER
Call placed to patient and confirmed insurance for [employer], will call insurance as EPA can not find patient

## 2021-07-26 ENCOUNTER — OFFICE VISIT (OUTPATIENT)
Dept: INTERNAL MEDICINE CLINIC | Age: 61
End: 2021-07-26
Payer: COMMERCIAL

## 2021-07-26 VITALS
SYSTOLIC BLOOD PRESSURE: 160 MMHG | OXYGEN SATURATION: 95 % | RESPIRATION RATE: 16 BRPM | WEIGHT: 218.6 LBS | HEIGHT: 69 IN | BODY MASS INDEX: 32.38 KG/M2 | HEART RATE: 66 BPM | TEMPERATURE: 98.6 F | DIASTOLIC BLOOD PRESSURE: 90 MMHG

## 2021-07-26 DIAGNOSIS — Z12.11 SCREENING FOR MALIGNANT NEOPLASM OF COLON: ICD-10-CM

## 2021-07-26 DIAGNOSIS — E78.2 MIXED HYPERLIPIDEMIA: ICD-10-CM

## 2021-07-26 DIAGNOSIS — I10 ESSENTIAL HYPERTENSION, BENIGN: ICD-10-CM

## 2021-07-26 DIAGNOSIS — F51.01 PRIMARY INSOMNIA: ICD-10-CM

## 2021-07-26 DIAGNOSIS — E78.00 PURE HYPERCHOLESTEROLEMIA: ICD-10-CM

## 2021-07-26 DIAGNOSIS — E66.9 OBESITY (BMI 30-39.9): ICD-10-CM

## 2021-07-26 DIAGNOSIS — G47.00 INSOMNIA, UNSPECIFIED TYPE: Primary | ICD-10-CM

## 2021-07-26 PROCEDURE — 99214 OFFICE O/P EST MOD 30 MIN: CPT | Performed by: INTERNAL MEDICINE

## 2021-07-26 RX ORDER — TRAZODONE HYDROCHLORIDE 50 MG/1
50 TABLET ORAL
Qty: 90 TABLET | Refills: 1 | Status: SHIPPED | OUTPATIENT
Start: 2021-07-26 | End: 2022-08-02 | Stop reason: ALTCHOICE

## 2021-07-26 RX ORDER — HYDROCHLOROTHIAZIDE 12.5 MG/1
TABLET ORAL
Qty: 90 TABLET | Refills: 1 | Status: SHIPPED | OUTPATIENT
Start: 2021-07-26 | End: 2021-08-13

## 2021-07-26 RX ORDER — ZOLPIDEM TARTRATE 12.5 MG/1
TABLET, FILM COATED, EXTENDED RELEASE ORAL
Qty: 30 TABLET | Refills: 0 | Status: SHIPPED | OUTPATIENT
Start: 2021-07-26 | End: 2021-09-28

## 2021-07-26 RX ORDER — ATORVASTATIN CALCIUM 20 MG/1
TABLET, FILM COATED ORAL
Qty: 90 TABLET | Refills: 1 | Status: SHIPPED | OUTPATIENT
Start: 2021-07-26 | End: 2021-08-13

## 2021-07-26 NOTE — PROGRESS NOTES
Health Maintenance Due   Topic Date Due    Shingrix Vaccine Age 49> (1 of 2) Never done    Colorectal Cancer Screening Combo  11/10/2019    Lipid Screen  07/13/2021       Chief Complaint   Patient presents with    Medication Refill    Finger Swelling     Cracking up     Other     f/u       1. Have you been to the ER, urgent care clinic since your last visit? Hospitalized since your last visit? No    2. Have you seen or consulted any other health care providers outside of the [address] [place] [address] since your last visit? Include any pap smears or colon screening. No    3) Do you have an Advance Directive on file? no    4) Are you interested in receiving information on Advance Directives? NO      Patient is accompanied by self I have received verbal consent from Dora Pena. to discuss any/all medical information while they are present in the room.

## 2021-07-26 NOTE — PROGRESS NOTES
HISTORY OF PRESENT ILLNESS  Nikki Ortiz. is a 64 y.o. male here for follow-up. He has been taking trazodone but taking half tablet. Is working but not taking enough. He still takes Ambien some days, he is breaking half of Ambien. 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 Did not mention anything about Ambien. Has hypertension, compliant with medication. Denies chest pain palpitation or shortness of breath. Blood pressure slightly better. He did take the medication today. She is trying to watch salt. He is obese, trying to lose weight. Has elevated lipids, on statin. No myalgia. Noticed to have fungus on that and nail also have some bacterial infection on not improve the fingers, taking Naprosyn and Eucrisa. Seeing dermatologist.  Need colonoscopy. HPI    Review of Systems   Constitutional: Negative. HENT: Negative. Eyes: Negative. Respiratory: Negative. Cardiovascular: Negative. Gastrointestinal: Negative. Genitourinary: Negative. Musculoskeletal: Negative. Skin: Negative. Neurological: Negative. Endo/Heme/Allergies: Negative. Psychiatric/Behavioral: The patient has insomnia. Physical Exam  Constitutional:       Appearance: Normal appearance. He is obese. Cardiovascular:      Rate and Rhythm: Normal rate and regular rhythm. Pulses: Normal pulses. Heart sounds: Normal heart sounds. Pulmonary:      Effort: Pulmonary effort is normal.      Breath sounds: Normal breath sounds. Abdominal:      General: Abdomen is flat. Bowel sounds are normal.      Palpations: Abdomen is soft. Musculoskeletal:      Cervical back: Normal range of motion and neck supple. Skin:     General: Skin is warm. Comments: All fingers: Dry skin noticed. Some break of skin. Little finger has onychomycosis.    Neurological:      Mental Status: He is alert. Psychiatric:         Mood and Affect: Mood normal.         Behavior: Behavior normal.         Thought Content: Thought content normal.      Comments: Not depressed. Has insomnia. ASSESSMENT and PLAN  Diagnoses and all orders for this visit:    1. Insomnia, unspecified type    Advised him to take trazodone 1 tablet every night. He is only using it for sleep, no depression or anxiety involved. -     traZODone (DESYREL) 50 mg tablet; Take 1 Tablet by mouth nightly. 2. Essential hypertension, benign    Elevated blood pressure. On Diovan and hydrochlorothiazide. He did not take his medicine this morning. Advised to be on DASH diet. Will refill,  -     hydroCHLOROthiazide (HYDRODIURIL) 12.5 mg tablet; Take 1 tablet by mouth once daily    3. Obesity (BMI 30-39. 9)  Addressed weight, diet and exercise with patient. Decrease carbohydrates (white foods, sweet foods, sweet drinks and alcohol), increase green leafy vegetables and protein (lean meats and beans) with each meal. Avoid fried foods. Eat 3-5 small meals daily. Do not skip meals. Increase water intake. Increase physical activity to 30 minutes daily for health benefit or 60 minutes daily to prevent weight regain, as tolerated. Get 7-8 hours uninterrupted sleep nightly. 4. Mixed hyperlipidemia  Be on low-cholesterol diet. Will check,    -     METABOLIC PANEL, COMPREHENSIVE  -     LIPID PANEL    5. Primary insomnia    He is taking trazodone 50 mg 1 tablet a day but he mentioned that he is only taking half tablet at night. He is planning to be a  for private plane, for his physical as a , he mentioned that he was told not to take trazodone which he cannot take when he flies. He mentioned that urine tell him to stop taking Ambien which is surprising. I have advised him to talk to the authority to find out what medication he is allowed to take. Advised him to take trazodone 50 mg 1 tablet every day.   Will give Ambien CR #30 tablet for 3 months.  -     zolpidem CR (AMBIEN CR) 12.5 mg tablet; TAKE 1 TABLET BY MOUTH ONCE DAILY AT BEDTIME AS NEEDED FOR SLEEP #30, no refill  Follow-up in 3 months. We will not give any prescription until we see him next time. 6. Pure hypercholesterolemia    We will refill,  -     atorvastatin (LIPITOR) 20 mg tablet; Take 1 tablet by mouth once daily    7. Screening for malignant neoplasm of colon    We will refer,  -     REFERRAL TO GASTROENTEROLOGY    Discussed expected course/resolution/complications of diagnosis in detail with patient. Medication risks/benefits/costs/interactions/alternatives discussed with patient. Discussed COVID-19 infection precaution with patient. Pt was given an after visit summary which includes diagnoses, current medications & vitals. Pt expressed understanding with the diagnosis and plan.

## 2021-08-13 DIAGNOSIS — I10 ESSENTIAL HYPERTENSION, BENIGN: ICD-10-CM

## 2021-08-13 DIAGNOSIS — E78.00 PURE HYPERCHOLESTEROLEMIA: ICD-10-CM

## 2021-08-13 RX ORDER — HYDROCHLOROTHIAZIDE 12.5 MG/1
TABLET ORAL
Qty: 90 TABLET | Refills: 0 | Status: SHIPPED | OUTPATIENT
Start: 2021-08-13 | End: 2021-12-22

## 2021-08-13 RX ORDER — ATORVASTATIN CALCIUM 20 MG/1
TABLET, FILM COATED ORAL
Qty: 90 TABLET | Refills: 0 | Status: SHIPPED | OUTPATIENT
Start: 2021-08-13 | End: 2021-12-22

## 2021-08-26 LAB
ALBUMIN SERPL-MCNC: 4.7 G/DL (ref 3.8–4.9)
ALBUMIN/GLOB SERPL: 2 {RATIO} (ref 1.2–2.2)
ALP SERPL-CCNC: 60 IU/L (ref 48–121)
ALT SERPL-CCNC: 42 IU/L (ref 0–44)
AST SERPL-CCNC: 34 IU/L (ref 0–40)
BILIRUB SERPL-MCNC: 0.6 MG/DL (ref 0–1.2)
BUN SERPL-MCNC: 14 MG/DL (ref 6–24)
BUN/CREAT SERPL: 15 (ref 9–20)
CALCIUM SERPL-MCNC: 9.3 MG/DL (ref 8.7–10.2)
CHLORIDE SERPL-SCNC: 101 MMOL/L (ref 96–106)
CHOLEST SERPL-MCNC: 138 MG/DL (ref 100–199)
CO2 SERPL-SCNC: 26 MMOL/L (ref 20–29)
CREAT SERPL-MCNC: 0.96 MG/DL (ref 0.76–1.27)
GLOBULIN SER CALC-MCNC: 2.4 G/DL (ref 1.5–4.5)
GLUCOSE SERPL-MCNC: 109 MG/DL (ref 65–99)
HDLC SERPL-MCNC: 47 MG/DL
IMP & REVIEW OF LAB RESULTS: NORMAL
LDLC SERPL CALC-MCNC: 60 MG/DL (ref 0–99)
POTASSIUM SERPL-SCNC: 4.4 MMOL/L (ref 3.5–5.2)
PROT SERPL-MCNC: 7.1 G/DL (ref 6–8.5)
SODIUM SERPL-SCNC: 139 MMOL/L (ref 134–144)
TRIGL SERPL-MCNC: 190 MG/DL (ref 0–149)
VLDLC SERPL CALC-MCNC: 31 MG/DL (ref 5–40)

## 2021-08-27 NOTE — PROGRESS NOTES
Glucose mildly elevated. Please add HgbA1c if able. Triglycerides elevated. Recommend that patient watch diet for foods high in sugar/ carbohydrates and exercise as tolerated.

## 2021-09-07 DIAGNOSIS — R73.09 ELEVATED GLUCOSE: Primary | ICD-10-CM

## 2021-09-22 DIAGNOSIS — I10 ESSENTIAL HYPERTENSION, BENIGN: ICD-10-CM

## 2021-09-22 DIAGNOSIS — L85.3 DRY SKIN DERMATITIS: ICD-10-CM

## 2021-09-22 RX ORDER — CRISABOROLE 20 MG/G
OINTMENT TOPICAL
Qty: 60 G | Refills: 0 | Status: SHIPPED | OUTPATIENT
Start: 2021-09-22 | End: 2022-04-20

## 2021-09-22 RX ORDER — VALSARTAN 160 MG/1
TABLET ORAL
Qty: 30 TABLET | Refills: 0 | Status: SHIPPED | OUTPATIENT
Start: 2021-09-22 | End: 2021-11-05

## 2021-09-27 DIAGNOSIS — F51.01 PRIMARY INSOMNIA: ICD-10-CM

## 2021-09-28 RX ORDER — ZOLPIDEM TARTRATE 12.5 MG/1
TABLET, FILM COATED, EXTENDED RELEASE ORAL
Qty: 30 TABLET | Refills: 0 | Status: SHIPPED | OUTPATIENT
Start: 2021-09-28 | End: 2021-12-27

## 2021-10-05 ENCOUNTER — TELEPHONE (OUTPATIENT)
Dept: INTERNAL MEDICINE CLINIC | Age: 61
End: 2021-10-05

## 2021-10-05 NOTE — TELEPHONE ENCOUNTER
Placed call to pt to inform him that we do not administer the shingles vaccine here in office. lvm to let pt know that he can get this at his local pharmacy.

## 2021-11-04 DIAGNOSIS — I10 ESSENTIAL HYPERTENSION, BENIGN: ICD-10-CM

## 2021-11-05 RX ORDER — VALSARTAN 160 MG/1
TABLET ORAL
Qty: 30 TABLET | Refills: 0 | Status: SHIPPED | OUTPATIENT
Start: 2021-11-05 | End: 2021-12-23

## 2021-12-22 DIAGNOSIS — I10 ESSENTIAL HYPERTENSION, BENIGN: ICD-10-CM

## 2021-12-22 DIAGNOSIS — E78.00 PURE HYPERCHOLESTEROLEMIA: ICD-10-CM

## 2021-12-22 RX ORDER — HYDROCHLOROTHIAZIDE 12.5 MG/1
TABLET ORAL
Qty: 90 TABLET | Refills: 0 | Status: SHIPPED | OUTPATIENT
Start: 2021-12-22 | End: 2022-02-02 | Stop reason: ALTCHOICE

## 2021-12-22 RX ORDER — ATORVASTATIN CALCIUM 20 MG/1
TABLET, FILM COATED ORAL
Qty: 90 TABLET | Refills: 0 | Status: SHIPPED | OUTPATIENT
Start: 2021-12-22 | End: 2022-03-16

## 2021-12-23 RX ORDER — VALSARTAN 160 MG/1
TABLET ORAL
Qty: 30 TABLET | Refills: 0 | Status: SHIPPED | OUTPATIENT
Start: 2021-12-23 | End: 2022-02-02 | Stop reason: ALTCHOICE

## 2022-02-02 ENCOUNTER — OFFICE VISIT (OUTPATIENT)
Dept: INTERNAL MEDICINE CLINIC | Age: 62
End: 2022-02-02
Payer: COMMERCIAL

## 2022-02-02 VITALS
SYSTOLIC BLOOD PRESSURE: 138 MMHG | BODY MASS INDEX: 32.73 KG/M2 | HEIGHT: 69 IN | HEART RATE: 73 BPM | DIASTOLIC BLOOD PRESSURE: 84 MMHG | OXYGEN SATURATION: 97 % | TEMPERATURE: 98.4 F | RESPIRATION RATE: 18 BRPM | WEIGHT: 221 LBS

## 2022-02-02 DIAGNOSIS — F51.01 PRIMARY INSOMNIA: ICD-10-CM

## 2022-02-02 DIAGNOSIS — E78.2 MIXED HYPERLIPIDEMIA: ICD-10-CM

## 2022-02-02 DIAGNOSIS — I10 ESSENTIAL HYPERTENSION, BENIGN: Primary | ICD-10-CM

## 2022-02-02 DIAGNOSIS — G47.00 INSOMNIA, UNSPECIFIED TYPE: ICD-10-CM

## 2022-02-02 DIAGNOSIS — Z23 ENCOUNTER FOR IMMUNIZATION: ICD-10-CM

## 2022-02-02 PROCEDURE — 99214 OFFICE O/P EST MOD 30 MIN: CPT | Performed by: INTERNAL MEDICINE

## 2022-02-02 RX ORDER — ZOLPIDEM TARTRATE 12.5 MG/1
TABLET, FILM COATED, EXTENDED RELEASE ORAL
Qty: 15 TABLET | Refills: 1 | Status: SHIPPED | OUTPATIENT
Start: 2022-02-02 | End: 2022-04-04

## 2022-02-02 RX ORDER — VALSARTAN AND HYDROCHLOROTHIAZIDE 160; 12.5 MG/1; MG/1
1 TABLET, FILM COATED ORAL DAILY
Qty: 90 TABLET | Refills: 1 | Status: SHIPPED | OUTPATIENT
Start: 2022-02-02 | End: 2022-05-18 | Stop reason: SDUPTHER

## 2022-02-02 RX ORDER — HYDROXYZINE 25 MG/1
25 TABLET, FILM COATED ORAL
Qty: 30 TABLET | Refills: 2 | Status: SHIPPED | OUTPATIENT
Start: 2022-02-02 | End: 2022-02-12

## 2022-02-02 RX ORDER — ZOSTER VACCINE RECOMBINANT, ADJUVANTED 50 MCG/0.5
0.5 KIT INTRAMUSCULAR ONCE
Qty: 0.5 ML | Refills: 1 | Status: SHIPPED | OUTPATIENT
Start: 2022-02-02 | End: 2022-02-02

## 2022-02-02 NOTE — PROGRESS NOTES
HISTORY OF PRESENT ILLNESS  Myrna Graves is a 63 y.o. male here for follow-up. He is not able to take trazodone because he cannot fly with the trazodone according to rules and regulation. Ambien helps him to sleep but not able to give him Ambien every day. Has hypertension, compliant with medication. Denies chest pain palpitation or shortness of breath. Doing well. Need refill on Diovan hydrochlorothiazide. He is obese, trying to lose weight. Has elevated lipids, on statin. No myalgia. Need Shingrix vaccine. Up-to-date with Covid vaccine. HPI      Review of Systems   Constitutional: Negative. HENT: Negative. Eyes: Negative. Respiratory: Negative. Cardiovascular: Negative. Gastrointestinal: Negative. Genitourinary: Negative. Musculoskeletal: Negative. Skin: Negative. Neurological: Negative. Endo/Heme/Allergies: Negative. Psychiatric/Behavioral: The patient has insomnia. Physical Exam  Constitutional:       Appearance: Normal appearance. He is obese. Cardiovascular:      Rate and Rhythm: Normal rate and regular rhythm. Pulses: Normal pulses. Heart sounds: Normal heart sounds. Pulmonary:      Effort: Pulmonary effort is normal.      Breath sounds: Normal breath sounds. Abdominal:      General: Abdomen is flat. Bowel sounds are normal.      Palpations: Abdomen is soft. Musculoskeletal:         General: Normal range of motion. Cervical back: Normal range of motion and neck supple. Skin:     General: Skin is warm. Neurological:      Mental Status: He is alert. Psychiatric:         Mood and Affect: Mood normal.         Behavior: Behavior normal.         Thought Content: Thought content normal.      Comments: Not depressed. Has insomnia. ASSESSMENT and PLAN  Diagnoses and all orders for this visit:    1. Essential hypertension, benign    Stable blood pressure.   Will refill,  -     valsartan-hydroCHLOROthiazide (DIOVAN-HCT) 160-12.5 mg per tablet; Take 1 Tablet by mouth daily. DC valsartan,HCTZ  -     METABOLIC PANEL, COMPREHENSIVE    2. Insomnia, unspecified type    He might have sleep apnea. Will refer,  -     SLEEP MEDICINE REFERRAL    3. Primary insomnia    Not able to take care of trazodone because he fly as a . Will discontinue trazodone. We will try hydroxyzine at night. Will give Ambien but not able to give Ambien on regular basis. -     zolpidem CR (AMBIEN CR) 12.5 mg tablet; 1 tab po every day as needed #15 with 1 refill.  -     hydrOXYzine HCL (ATARAX) 25 mg tablet; Take 1 Tablet by mouth nightly as needed for Anxiety or Sleep for up to 10 days. DC trazodone #30, 3 refill. 4. Mixed hyperlipidemia  On Lipitor. Stable. 5. Encounter for immunization    We will order,-     varicella-zoster recombinant, PF, (Shingrix, PF,) 50 mcg/0.5 mL susr injection; 0.5 mL by IntraMUSCular route once for 1 dose. Discussed expected course/resolution/complications of diagnosis in detail with patient. Medication risks/benefits/costs/interactions/alternatives discussed with patient. Discussed COVID-19 infection precaution with patient. Pt was given an after visit summary which includes diagnoses, current medications & vitals. Pt expressed understanding with the diagnosis and plan.

## 2022-02-02 NOTE — PROGRESS NOTES
Rm    Chief Complaint   Patient presents with    Hypertension    Medication Refill    Obesity    Insomnia        Visit Vitals  /84 (BP 1 Location: Left upper arm, BP Patient Position: Sitting, BP Cuff Size: Large adult)   Pulse 73   Temp 98.4 °F (36.9 °C) (Oral)   Resp 18   Ht 5' 9\" (1.753 m)   Wt 221 lb (100.2 kg)   SpO2 97%   BMI 32.64 kg/m²        1. Have you been to the ER, urgent care clinic since your last visit? Hospitalized since your last visit? No    2. Have you seen or consulted any other health care providers outside of the [address] [place] [address] since your last visit? Include any pap smears or colon screening. No     Health Maintenance Due   Topic Date Due    Shingrix Vaccine Age 49> (1 of 2) Never done    Flu Vaccine (1) 09/01/2021    COVID-19 Vaccine (3 - Booster for Pfizer series) 09/22/2021        3 most recent PHQ Screens 2/2/2022   PHQ Not Done -   Little interest or pleasure in doing things Not at all   Feeling down, depressed, irritable, or hopeless Not at all   Total Score PHQ 2 0        Fall Risk Assessment, last 12 mths 7/13/2020   Able to walk? Yes   Fall in past 12 months?  No       Learning Assessment 6/17/2015   PRIMARY LEARNER Patient   HIGHEST LEVEL OF EDUCATION - PRIMARY LEARNER  > 4 YEARS OF COLLEGE   BARRIERS PRIMARY LEARNER NONE   CO-LEARNER CAREGIVER No   PRIMARY LANGUAGE ENGLISH    NEED No   LEARNER PREFERENCE PRIMARY READING   LEARNING SPECIAL TOPICS no   ANSWERED BY patient   RELATIONSHIP SELF

## 2022-02-03 LAB
ALBUMIN SERPL-MCNC: 4.9 G/DL (ref 3.8–4.9)
ALBUMIN/GLOB SERPL: 2.2 {RATIO} (ref 1.2–2.2)
ALP SERPL-CCNC: 65 IU/L (ref 44–121)
ALT SERPL-CCNC: 53 IU/L (ref 0–44)
AST SERPL-CCNC: 33 IU/L (ref 0–40)
BILIRUB SERPL-MCNC: 0.6 MG/DL (ref 0–1.2)
BUN SERPL-MCNC: 14 MG/DL (ref 6–24)
BUN/CREAT SERPL: 16 (ref 9–20)
CALCIUM SERPL-MCNC: 9.5 MG/DL (ref 8.7–10.2)
CHLORIDE SERPL-SCNC: 101 MMOL/L (ref 96–106)
CO2 SERPL-SCNC: 27 MMOL/L (ref 20–29)
CREAT SERPL-MCNC: 0.87 MG/DL (ref 0.76–1.27)
GLOBULIN SER CALC-MCNC: 2.2 G/DL (ref 1.5–4.5)
GLUCOSE SERPL-MCNC: 105 MG/DL (ref 65–99)
POTASSIUM SERPL-SCNC: 4.2 MMOL/L (ref 3.5–5.2)
PROT SERPL-MCNC: 7.1 G/DL (ref 6–8.5)
SODIUM SERPL-SCNC: 141 MMOL/L (ref 134–144)

## 2022-02-08 DIAGNOSIS — R74.8 ELEVATED LIVER ENZYMES: Primary | ICD-10-CM

## 2022-02-08 NOTE — PROGRESS NOTES
ALT liver enzyme mildly elevated. If applicable, reduce Tylenol and alcohol intake. Repeat CMP in 6 weeks.

## 2022-03-28 DIAGNOSIS — K21.9 GASTROESOPHAGEAL REFLUX DISEASE WITHOUT ESOPHAGITIS: ICD-10-CM

## 2022-03-28 RX ORDER — OMEPRAZOLE 40 MG/1
CAPSULE, DELAYED RELEASE ORAL
Qty: 30 CAPSULE | Refills: 0 | Status: SHIPPED | OUTPATIENT
Start: 2022-03-28 | End: 2022-04-20

## 2022-04-03 DIAGNOSIS — F51.01 PRIMARY INSOMNIA: ICD-10-CM

## 2022-04-04 RX ORDER — ZOLPIDEM TARTRATE 12.5 MG/1
TABLET, FILM COATED, EXTENDED RELEASE ORAL
Qty: 15 TABLET | Refills: 0 | Status: SHIPPED | OUTPATIENT
Start: 2022-04-04 | End: 2022-05-18 | Stop reason: SDUPTHER

## 2022-04-14 ENCOUNTER — DOCUMENTATION ONLY (OUTPATIENT)
Dept: SLEEP MEDICINE | Age: 62
End: 2022-04-14

## 2022-04-14 ENCOUNTER — OFFICE VISIT (OUTPATIENT)
Dept: SLEEP MEDICINE | Age: 62
End: 2022-04-14
Payer: COMMERCIAL

## 2022-04-14 VITALS
TEMPERATURE: 97.1 F | OXYGEN SATURATION: 96 % | HEIGHT: 69 IN | BODY MASS INDEX: 33.1 KG/M2 | RESPIRATION RATE: 20 BRPM | SYSTOLIC BLOOD PRESSURE: 140 MMHG | HEART RATE: 68 BPM | WEIGHT: 223.5 LBS | DIASTOLIC BLOOD PRESSURE: 85 MMHG

## 2022-04-14 DIAGNOSIS — I10 ESSENTIAL HYPERTENSION, BENIGN: ICD-10-CM

## 2022-04-14 DIAGNOSIS — G47.33 OBSTRUCTIVE SLEEP APNEA (ADULT) (PEDIATRIC): Primary | ICD-10-CM

## 2022-04-14 DIAGNOSIS — G47.00 INSOMNIA, UNSPECIFIED TYPE: ICD-10-CM

## 2022-04-14 PROCEDURE — 99204 OFFICE O/P NEW MOD 45 MIN: CPT | Performed by: INTERNAL MEDICINE

## 2022-04-14 NOTE — PROGRESS NOTES
[address] [place]. [place], [address]  Tel.  [phone]  Fax. [address] [facility], [address] [facility]  Tel.  [phone]  Fax. [phone] [address]  Tel.  [phone]  Fax. [phone]         Subjective:      Stiven Jones is an 63 y.o. male referred for evaluation for a sleep disorder. He complains of intermittent difficulty falling and staying associated with work stressors/frequent travel and snoring. He was advised to be evaluated for sleep apnea. .  Symptoms began several years ago, unchanged since that time. He usually can fall asleep in 2 minutes. Family or house members note snoring, periods of not breathing. He denies falling asleep while at work, driving. He sometimes takes Rosalio [place] CR -a half tablet a few times a week as needed with relief of symptoms. He denies adverse effects from the Rosalio [place] CR. He does not feel groggy in the morning  Stiven Gill. does wake up frequently at night. He is bothered by waking up too early and left unable to get back to sleep. He actually sleeps about 6 hours at night and wakes up about 3 times during the night. He does not work shifts:  .   Argentina Ellis indicates he does get too little sleep at night. His bedtime is 2200. He awakens at 0500. He does take naps. He takes 1 naps a week lasting 15 to 30. He has the following observed behaviors: Loud snoring,Pauses in breathing;  (waking with a gasp or snort). Other remarks:  (vivid dreams) he tends to sleep on his side. He has awakened gasping if he is sleeping on his back.   He works in finance  [place] Sleepiness Score: 5        Allergies   Allergen Reactions    Bee Sting [Sting, Bee] Anaphylaxis    Amlodipine Other (comments)     Increased GERD    Demerol [Meperidine] Unknown (comments)     Doubles over in pain    Lisinopril Cough and Other (comments)     Fatigue           Current Outpatient Medications:     zolpidem CR (AMBIEN CR) 12.5 mg tablet, TAKE 1 TABLET BY MOUTH ONCE DAILY AS NEEDED  **APPT REQUIRED FOR FURTHER REFILLS**, Disp: 15 Tablet, Rfl: 0    atorvastatin (LIPITOR) 20 mg tablet, TAKE 1 TABLET BY MOUTH EVERY DAY, Disp: 30 Tablet, Rfl: 5    valsartan-hydroCHLOROthiazide (DIOVAN-HCT) 160-12.5 mg per tablet, Take 1 Tablet by mouth daily. DC valsartan,HCTZ, Disp: 90 Tablet, Rfl: 1    Eucrisa 2 % oint, APPLY  OINTMENT TO AFFECTED AREA TWICE DAILY, Disp: 60 g, Rfl: 0    traZODone (DESYREL) 50 mg tablet, Take 1 Tablet by mouth nightly., Disp: 90 Tablet, Rfl: 1    naproxen (NAPROSYN) 500 mg tablet, Take 1 Tab by mouth daily as needed for Pain., Disp: 30 Tab, Rfl: 0    baclofen (LIORESAL) 10 mg tablet, Take 1 Tab by mouth two (2) times daily as needed for Muscle Spasm(s). , Disp: 30 Tab, Rfl: 0    cholecalciferol, vitamin D3, (VITAMIN D3) 2,000 unit tab, Take  by mouth., Disp: , Rfl:     glucosamine sulfate 500 mg capsule, Take  by mouth three (3) times daily. , Disp: , Rfl:     selenium 200 mcg Tab, Take 400 mcg by mouth daily. , Disp: , Rfl:     ascorbic acid (VITAMIN C) 500 mg tablet, Take  by mouth., Disp: , Rfl:     pyridoxine (VITAMIN B-6) 100 mg tablet, Take 100 mg by mouth every other day., Disp: , Rfl:     omeprazole (PRILOSEC) 40 mg capsule, TAKE 1 CAPSULE BY MOUTH ONCE DAILY AS NEEDED (Patient not taking: Reported on 4/14/2022), Disp: 30 Capsule, Rfl: 0    nystatin (MYCOSTATIN) topical cream, APPLY  CREAM TOPICALLY TWICE DAILY TO THE AFFECTED AREA (Patient not taking: Reported on 7/26/2021), Disp: 15 g, Rfl: 1    Omega-3-DHA-EPA-Fish Oil 1,000 mg (120 mg-180 mg) cap, Take 2 Tabs by mouth two (2) times a day. (Patient not taking: Reported on 7/26/2021), Disp: 1 Cap, Rfl: 0    niacin-inositol (NIACIN FLUSH FREE) 400 mg niacin (500 mg) cap, Take 1 Cap by mouth daily.  (Patient not taking: Reported on 7/26/2021), Disp: , Rfl:      He  has a past medical history of CAD (coronary artery disease) (2016), GERD (gastroesophageal reflux disease), Hypertension, Mixed hyperlipidemia, Prediabetes (09/2016), Rectal polyp (11/10/14), Rotator cuff tear, left, and Sleep disturbance. He has no past medical history of Asthma, Cancer ([facility] Chronic kidney disease, Congestive heart failure ([facility] or Thyroid disease. He  has a past surgical history that includes hx knee arthroscopy (Right, 2006); hx mohs procedure (Right, 2008); hx colonoscopy (11/10/14); hx meniscus repair (Right, 06/21/2016); and hx rotator cuff repair (09/2016). He family history includes Cancer in his father and paternal uncle; Diabetes in his father; Heart Disease in his father. He  reports that he has never smoked. He has never used smokeless tobacco. He reports current alcohol use of about 3.0 - 5.0 standard drinks of alcohol per week. He reports that he does not use drugs. Review of Systems:  Constitutional:  No significant weight loss or weight gain. Eyes:  No blurred vision. CVS:  No significant chest pain  Pulm:  No significant shortness of breath  GI:  No significant nausea or vomiting  :  No significant nocturia  Musculoskeletal:  No significant joint pain at night  Skin:  No significant rashes  Neuro:  No significant dizziness   Psych:  No active mood issues    Sleep Review of Systems: notable for intermittent difficulty falling asleep; intermittent frequent awakenings at night;  regular dreaming noted; no nightmares ; no early morning headaches; no memory problems; no concentration issues  .       Objective:     Visit Vitals  BP (!) 140/85 (BP 1 Location: Right arm, BP Patient Position: Sitting, BP Cuff Size: Large adult)   Pulse 68   Temp 97.1 °F (36.2 °C) (Temporal)   Resp 20   Ht 5' 9\" (1.753 m)   Wt 223 lb 8 oz (101.4 kg)   SpO2 96%   BMI 33.01 kg/m²         General:   Not in acute distress   Eyes:  Anicteric sclerae, no obvious strabismus   Nose:  No obvious nasal septum deviation    Oropharynx:   Class 3 oropharyngeal outlet, thick tongue base,low-lying soft palate,    Tonsils:   tonsils are present and normal   Neck:    ; midline trachea   Chest/Lungs:  Equal lung expansion, clear on auscultation    CVS:  Normal rate, regular rhythm; no JVD   Skin:  Warm to touch; no obvious rashes   Neuro:  No focal deficits ; no obvious tremor    Psych:  Normal affect,  normal countenance;          Assessment:       ICD-10-CM ICD-9-CM    1. Obstructive sleep apnea (adult) (pediatric)  G47.33 327.23 SLEEP STUDY UNATTENDED, 4 CHANNEL   2. Insomnia, unspecified type  G47.00 780.52    3. Essential hypertension, benign  I10 401.1          Plan:     * The patient currently has a Moderate Risk for having sleep apnea. STOP-BANG score 5.  * HSAT was ordered for initial evaluation. Treatment options for sleep apnea were reviewed. He understands sedating medications//alcohol can worsen snoring and apnea. * He was provided information on sleep apnea including coresponding risk factors and the importance of proper treatment. * Counseling was provided regarding proper sleep hygiene and safe driving. I will contact him by phone or Darshana Ham with results. 2. Insomnia - I have advised a regular sleep wake cycle. he  was advised to avoid looking at the clock during the night. Ideally, the clock face should be turned away. he was advised to minimize caffeine use and to avoid caffeine-containing beverages 8 hours prior to bedtime. Naps were discouraged. A regular exercise schedule, at least 3 hours before bedtime, would be beneficial to improving sleep quality. he was advised to avoid evening light and to use sunglasses in the late afternoon. Watching TV, using laptops, tablets and smartphones in the evening was discouraged. he  was advised to keep the bedroom cool and dark. Relaxation strategies and realistic expectations for sleep were reviewed today. I encouraged him to minimize the frequency of taking the ambien CR.  If needed for occasional use only. We discussed relaxation strategies on going to bed to shut his mind down. 3. Hypertension - he continues on his current regimen. I have reviewed the relationship between hypertension as it relates to sleep-disordered breathing. All of his questions were addressed. Thank you for allowing us to participate in your patient's medical care. We'll keep you updated on these investigations.   Electronically signed by    Delmi Mohan MD  Diplomate in Sleep Medicine  [facility]    [phone]

## 2022-04-14 NOTE — PATIENT INSTRUCTIONS
[facility] [place]. [place], [address]  Tel.  [phone]  Fax. [phone]  [place], [address] [facility]  Tel.  [phone]  Fax. [phone] [address]  Tel.  [phone]  Fax. [phone]     Sleep Apnea: After Your Visit  Your Care Instructions  Sleep apnea occurs when you frequently stop breathing for 10 seconds or longer during sleep. It can be mild to severe, based on the number of times per hour that you stop breathing or have slowed breathing. Blocked or narrowed airways in your nose, mouth, or throat can cause sleep apnea. Your airway can become blocked when your throat muscles and tongue relax during sleep. Sleep apnea is common, occurring in 1 out of 20 individuals. Individuals having any of the following characteristics should be evaluated and treated right away due to high risk and detrimental consequences from untreated sleep apnea:  1. Obesity  2. Congestive Heart failure  3. Atrial Fibrillation  4. Uncontrolled Hypertension  5. Type II Diabetes  6. Night-time Arrhythmias  7. Stroke  8. Pulmonary Hypertension  9. High-risk Driving Populations (pilots, truck drivers, etc.)  10. Patients Considering Weight-loss Surgery    How do you know you have sleep apnea? You probably have sleep apnea if you answer 'yes' to 3 or more of the following questions:  S - Have you been told that you Snore? T - Are you often Tired during the day? O - Has anyone Observed you stop breathing while sleeping? P- Do you have (or are being treated for) high blood Pressure? B - Are you obese (Body Mass Index > 35)? A - Is your Age 48years old or older? N - Is your Neck size greater than 16 inches? G - Are you male Gender? A sleep physician can prescribe a breathing device that prevents tissues in the throat from blocking your airway.  Or your doctor may recommend using a dental device (oral breathing device) to help keep your airway open. In some cases, surgery may be needed to remove enlarged tissues in the throat. Follow-up care is a key part of your treatment and safety. Be sure to make and go to all appointments, and call your doctor if you are having problems. It's also a good idea to know your test results and keep a list of the medicines you take. How can you care for yourself at home? · Lose weight, if needed. It may reduce the number of times you stop breathing or have slowed breathing. · Go to bed at the same time every night. · Sleep on your side. It may stop mild apnea. If you tend to roll onto your back, sew a pocket in the back of your pajama top. Put a tennis ball into the pocket, and stitch the pocket shut. This will help keep you from sleeping on your back. · Avoid alcohol and medicines such as sleeping pills and sedatives before bed. · Do not smoke. Smoking can make sleep apnea worse. If you need help quitting, talk to your doctor about stop-smoking programs and medicines. These can increase your chances of quitting for good. · Prop up the head of your bed 4 to 6 inches by putting bricks under the legs of the bed. · Treat breathing problems, such as a stuffy nose, caused by a cold or allergies. · Use a continuous positive airway pressure (CPAP) breathing machine if lifestyle changes do not help your apnea and your doctor recommends it. The machine keeps your airway from closing when you sleep. · If CPAP does not help you, ask your doctor whether you should try other breathing machines. A bilevel positive airway pressure machine has two types of air pressureâone for breathing in and one for breathing out. Another device raises or lowers air pressure as needed while you breathe. · If your nose feels dry or bleeds when using one of these machines, talk with your doctor about increasing moisture in the air. A humidifier may help.   · If your nose is runny or stuffy from using a breathing machine, talk with your doctor about using decongestants or a corticosteroid nasal spray. When should you call for help? Watch closely for changes in your health, and be sure to contact your doctor if:  · You still have sleep apnea even though you have made lifestyle changes. · You are thinking of trying a device such as CPAP. · You are having problems using a CPAP or similar machine. Where can you learn more? Go to 1006.tv. Enter E331 in the search box to learn more about \"Sleep Apnea: After Your Visit. \"   © 1293-0049 Healthwise, Incorporated. Care instructions adapted under license by [facility] (which disclaims liability or warranty for this information). This care instruction is for use with your licensed healthcare professional. If you have questions about a medical condition or this instruction, always ask your healthcare professional. Priscilla Thakkar any warranty or liability for your use of this information. PROPER SLEEP HYGIENE    What to avoid  · Do not have drinks with caffeine, such as coffee or black tea, for 8 hours before bed. · Do not smoke or use other types of tobacco near bedtime. Nicotine is a stimulant and can keep you awake. · Avoid drinking alcohol late in the evening, because it can cause you to wake in the middle of the night. · Do not eat a big meal close to bedtime. If you are hungry, eat a light snack. · Do not drink a lot of water close to bedtime, because the need to urinate may wake you up during the night. · Do not read or watch TV in bed. Use the bed only for sleeping and sexual activity. What to try  · Go to bed at the same time every night, and wake up at the same time every morning. Do not take naps during the day. · Keep your bedroom quiet, dark, and cool. · Get regular exercise, but not within 3 to 4 hours of your bedtime. .  · Sleep on a comfortable pillow and mattress.   · If watching the clock makes you anxious, turn it facing away from you so you cannot see the time. · If you worry when you lie down, start a worry book. Well before bedtime, write down your worries, and then set the book and your concerns aside. · Try meditation or other relaxation techniques before you go to bed. · If you cannot fall asleep, get up and go to another room until you feel sleepy. Do something relaxing. Repeat your bedtime routine before you go to bed again. · Make your house quiet and calm about an hour before bedtime. Turn down the lights, turn off the TV, log off the computer, and turn down the volume on music. This can help you relax after a busy day. Drowsy Driving  The [address] [place] [address] Road Traffic Safety Administration cites drowsiness as a causing factor in more than 092,894 police reported crashes annually, resulting in 76,000 injuries and 1,500 deaths. Other surveys suggest 55% of people polled have driven while drowsy in the past year, 23% had fallen asleep but not crashed, 3% crashed, and 2% had and accident due to drowsy driving. Who is at risk? Young Drivers: One study of drowsy driving accidents states that 55% of the drivers were under 25 years. Of those, 75% were male. Shift Workers and Travelers: People who work overnight or travel across time zones frequently are at higher risk of experiencing Circadian Rhythm Disorders. They are trying to work and function when their body is programed to sleep. Sleep Deprived: Lack of sleep has a serious impact on your ability to pay attention or focus on a task. Consistently getting less than the average of 8 hours your body needs creates partial or cumulative sleep deprivation. Untreated Sleep Disorders: Sleep Apnea, Narcolepsy, R.L.S., and other sleep disorders (untreated) prevent a person from getting enough restful sleep. This leads to excessive daytime sleepiness and increases the risk for drowsy driving accidents by up to 7 times.   Medications / Alcohol: Even over the counter medications can cause drowsiness. Medications that impair a drivers attention should have a warning label. Alcohol naturally makes you sleepy and on its own can cause accidents. Combined with excessive drowsiness its effects are amplified. Signs of Drowsy Driving:   * You don't remember driving the last few miles   * You may drift out of your akbar   * You are unable to focus and your thoughts wander   * You may yawn more often than normal   * You have difficulty keeping your eyes open / nodding off   * Missing traffic signs, speeding, or tailgating  Prevention-   Good sleep hygiene, lifestyle and behavioral choices have the most impact on drowsy driving. There is no substitute for sleep and the average person requires 8 hours nightly. If you find yourself driving drowsy, stop and sleep. Consider the sleep hygiene tips provided during your visit as well. Medication Refill Policy: Refills for all medications require 1 week advance notice. Please have your pharmacy fax a refill request. We are unable to fax, or call in \"controled substance\" medications and you will need to pick these prescriptions up from our office. "[employer] Activation    Thank you for requesting access to "[employer]. Please follow the instructions below to securely access and download your online medical record. "[employer] allows you to send messages to your doctor, view your test results, renew your prescriptions, schedule appointments, and more. How Do I Sign Up? 1. In your internet browser, go to [email or web address]. 2. Click on the First Time User? Click Here link in the Sign In box. You will see the New Member Sign Up page. 3. Enter your "[employer] Access Code exactly as it appears below. You will not need to use this code after youve completed the sign-up process. If you do not sign up before the expiration date, you must request a new code. "[employer] Access Code:  Activation code not generated  Current "[employer] Status: Active (This is the date your Fracture access code will )    4. Enter the last four digits of your Social Security Number (xxxx) and Date of Birth (mm/dd/yyyy) as indicated and click Submit. You will be taken to the next sign-up page. 5. Create a [employer] ID. This will be your Fracture login ID and cannot be changed, so think of one that is secure and easy to remember. 6. Create a Fracture password. You can change your password at any time. 7. Enter your Password Reset Question and Answer. This can be used at a later time if you forget your password. 8. Enter your e-mail address. You will receive e-mail notification when new information is available in [address] 9. Click Sign Up. You can now view and download portions of your medical record. 10. Click the Download Summary menu link to download a portable copy of your medical information. Additional Information    If you have questions, please call [phone]. Remember, Fracture is NOT to be used for urgent needs. For medical emergencies, dial 911.

## 2022-04-14 NOTE — Clinical Note
Thank you for the referral.  I will keep you informed of his progress.   [address],  [place]aura Hubbard

## 2022-04-18 DIAGNOSIS — E78.00 PURE HYPERCHOLESTEROLEMIA: ICD-10-CM

## 2022-04-18 RX ORDER — ATORVASTATIN CALCIUM 20 MG/1
TABLET, FILM COATED ORAL
Qty: 90 TABLET | Refills: 0 | Status: SHIPPED | OUTPATIENT
Start: 2022-04-18 | End: 2022-04-20

## 2022-04-19 NOTE — PROGRESS NOTES
ASSESSMENT/PLAN:  Below is the assessment and plan developed based on review of pertinent history, physical exam, labs, studies, and medications. 1. Knee pain, unspecified chronicity, unspecified laterality  -     XR KNEES BI MIN 4 V; Future  2. Osteoarthritis of right knee, unspecified osteoarthritis type  -     AZ DRAIN/INJECT LARGE JOINT/BURSA  -     REFERRAL TO ORTHOPEDICS  -     AZ DRAIN/INJECT LARGE JOINT/BURSA      Return for Referral to knee replacement specilaist.    In discussion with the patient, we considered the numerus possible diagnoses that could be contributing to their present symptoms. We also deliberated on the extensive management options that must be considered to treat their current condition. We reviewed their accessible prior medical records, diagnostic tests, and current health and employment information. We considered how these symptoms were affecting the patient´s activities of daily living as well as employment and fitness activities. The patient had various questions regarding the possible risks, benefits, complications, morbidity and mortality regarding their diagnosis and treatment options. The patients´ comorbidities were considered, and I advocated that they consider maximizing lifestyle modification through nutrition and exercise to aid in addressing their symptoms. Shared decision making yielded an understanding to move forward with conservation treatment preferences. The patient expressed understanding that if conservative management fails to alleviate the present symptoms they will return to office for re-evaluation and consideration of additional diagnostic tests and potential surgical options.      In the interim, we have recommended ice, elevation, and take prescription anti-inflammatory medications along with a physician directed home exercise program. We discussed the risks and common side effects of anti-inflammatory medications and instructed the patient to discontinue the medication and contact us if they experienced any side effects. The patient was encouraged to discuss the possible side effects with their family physician or pharmacist prior to initiating any new medications. We discussed the fact that many of the recommended treatment options presented are significantly limited by the patient´s social determinants of health. We also reviewed the circumstances surrounding the environment that they live and work which affect a wide range of health risk. We considered the limited access to appropriate educational resources regarding proper nutrition and exercise as well as the economic and social support necessary to maintain health and wellbeing. We discussed the possibility of an aspiration of the knee followed by an injection of a steroid mixed with a local anesthetic to relieve pain and decrease swelling and inflammation of the right knee. The risks of a steroid injection which include but are not limited to cartilage damage, death of nearby bone, joint infection, nerve damage, temporary facial flushing, temporary steroid flare of pain and inflammation in the joint, temporary increase in blood sugar, tendon weakening or rupture, thinning of nearby bone (osteoporosis), thinning of skin and soft tissue around the injection site, and whitening or lightening of the skin around the injection site were reviewed at length. We specifically advised that patients with diabetes talk to their primary care to ensure they are safe for a steroid injection due to the transient increase in blood sugar associated with the injection. We discussed the chance of increased bleeding and bruising if the patient is on blood thinners or certain dietary supplements that have a blood-thinning effect.  We advised patients that have an active infection, history of allergic reactions to steroids or take medications that may prohibit them from receiving a steroid injection to talk to their primary care physician before receiving and injection. We also talked about limiting the number of injections because these potential side effects increase with larger doses and repeated use. After explaining the risks and benefits of the procedure and obtaining verbal informed consent from the patient, the proposed area for injection was confirmed with the patient. After all questions and concerns were addressed, the skin was prepped with alcohol to reduce the chances of infection. The skin was anesthetized with topical ethylene chloride spray and a local anesthetic was injected into the skin around the site of aspiration. After the local anesthetic became affective, the knee fluid was aspirated, and a mixture of two milliliters of Depo-Medrol (40mg/ml), one milliliter of Lidocaine and one milliliter of Marcaine was injected slowly into the intra-articular space of right knee in a sterile fashion without difficulty. The needle was removed and disposed of in a sterile container. The patient tolerated the injection well and a band-aid was placed on the skin. The patient was counseled on protecting the injection area, avoiding water submersion for 2 days, icing for pain relief and looking for signs and symptoms of infection. We requested that the patient contact us if any symptoms persist greater than 48 hours after the injection. After explaining the risks and benefits of the procedure and obtaining verbal informed consent from the patient, the proposed area for injection was confirmed with the patient. After all questions and concerns were addressed, the skin was prepped with alcohol to reduce the chances of infection. The skin was anesthetized with a topical ethylene chloride spray and a mixture of two milliliters of Depo-Medrol (40mg/ml), one milliliter of Lidocaine and one milliliter of Marcaine was injected slowly into the intra-articular space of left knee in a sterile fashion without difficulty. The needle was removed and disposed of in a sterile container. The patient tolerated the injection well and a band-aid was placed on the skin. The patient was counseled on protecting the injection area, avoiding water submersion for 2 days, icing for pain relief and looking for signs and symptoms of infection. We requested that the patient contact us if any symptoms persist greater than 48 hours after the injection. I have encouraged the patient to take an active role in their treatment by pursuing a healthy lifestyle with better nutritional choices and regular low impact exercise. We discussed that weight loss can be beneficial to relieving discomfort in the lower extremities as well as other health benefits. I have asked the patient to seek advice from their primary care physician regarding additional resources available to achieve their weight loss goals. After a long discussion regarding treatment options, we have elected to refer the patient to one of our knee replacement specialists for more comprehensive care of their arthritis. SUBJECTIVE/OBJECTIVE:  Ismael Treviño (: 1960) is a 63 y.o. male, patient,here for evaluation of the Knee Pain (bilateral knee)  . The patient returns today for follow-up of his knees. He reports he is continue to have discomfort in the knees. He reports the right knee bothers him more than the left. He denies any numbness or tingling erythema or warmth. Denies any fevers chills or night sweats. He reports he has had some popping clicking catching. Reports his right knee feels full. He is continue to ice and elevate. Physical Exam    Upon physical examination, the patient is well developed, well nourished, alert, and oriented times three, with normal mood and affect and walks with an antalgic gait. Upon examination of the right knee, the patient is tender to palpation along the medial joint line and has an effusion.  They have crepitus of the patellofemoral joint with range of motion and discomfort with patella grind testing. The patient has discomfort with Linette´s maneuvers, and the knee is stable. They lack full flexion secondary to the effusion but have full extension. They have 5/5 strength and are neurovascularly intact distally. There is no erythema, warmth, or skin lesions present. Upon examination of the left knee, the patient is tender to palpation along the medial joint line and has an effusion. They have crepitus of the patellofemoral joint with range of motion and discomfort with patella grind testing. The patient has discomfort with Linette´s maneuvers, and the knee is stable. They lack full flexion secondary to the effusion but have full extension. They have 5/5 strength and are neurovascularly intact distally. There is no erythema, warmth, or skin lesions present. Imagin views of bilateral knees demonstrate severe medial compartment arthritis right greater than left. Allergies   Allergen Reactions    Bee Sting [Sting, Bee] Anaphylaxis    Amlodipine Other (comments)     Increased GERD    Demerol [Meperidine] Unknown (comments)     Doubles over in pain    Lisinopril Cough and Other (comments)     Fatigue         Current Outpatient Medications   Medication Sig    zolpidem CR (AMBIEN CR) 12.5 mg tablet TAKE 1 TABLET BY MOUTH ONCE DAILY AS NEEDED  **APPT REQUIRED FOR FURTHER REFILLS**    valsartan-hydroCHLOROthiazide (DIOVAN-HCT) 160-12.5 mg per tablet Take 1 Tablet by mouth daily. DC valsartan,HCTZ    traZODone (DESYREL) 50 mg tablet Take 1 Tablet by mouth nightly.  naproxen (NAPROSYN) 500 mg tablet Take 1 Tab by mouth daily as needed for Pain.  cholecalciferol, vitamin D3, (VITAMIN D3) 2,000 unit tab Take  by mouth.  selenium 200 mcg Tab Take 400 mcg by mouth daily.  ascorbic acid (VITAMIN C) 500 mg tablet Take  by mouth. No current facility-administered medications for this visit.        Past Medical History:   Diagnosis Date    CAD (coronary artery disease) 2016    mild; calcium scoring score=46    GERD (gastroesophageal reflux disease)     Hypertension     Mixed hyperlipidemia     LDL goal <70    Prediabetes 09/2016    Rectal polyp 11/10/14    Rotator cuff tear, left     Dr. Trevor Rutherford Sleep disturbance        Past Surgical History:   Procedure Laterality Date    HX COLONOSCOPY  11/10/14    +rectal polyp (hyperplastic), Dr. Vashti Son HX KNEE ARTHROSCOPY Right 2006    HX MENISCUS REPAIR Right 06/21/2016    Dr. Trevor Rutherford HX Charmayne Jefferson Right 2008    HX Karlene Harding  09/2016    Dr. Kary Macario       Family History   Problem Relation Age of Onset    Heart Disease Father         CAD, age 72    Diabetes Father     Cancer Father         lung--smoker    Cancer Paternal Uncle         lung - smoker       Social History     Socioeconomic History    Marital status: SINGLE     Spouse name: single    Number of children: 2    Years of education: Not on file    Highest education level: Not on file   Occupational History    Occupation: [employer]--hourly    Tobacco Use    Smoking status: Never Smoker    Smokeless tobacco: Never Used   Vaping Use    Vaping Use: Never used   Substance and Sexual Activity    Alcohol use: Yes     Alcohol/week: 3.0 - 5.0 standard drinks     Types: 3 - 5 Standard drinks or equivalent per week     Comment: social    Drug use: No    Sexual activity: Yes     Partners: Female     Comment: [facility]   Other Topics Concern    Not on file   Social History Narrative    Not on file     Social Determinants of Health     Financial Resource Strain:     Difficulty of Paying Living Expenses: Not on file   Food Insecurity:     Worried About [address] in the Last Year: Not on file    Jenna of Food in the Last Year: Not on file   Transportation Needs:     Lack of Transportation (Medical):  Not on file    Lack of Transportation (Non-Medical): Not on file   Physical Activity:     Days of Exercise per Week: Not on file    Minutes of Exercise per Session: Not on file   Stress:     Feeling of Stress : Not on file   Social Connections:     Frequency of Communication with Friends and Family: Not on file    Frequency of Social Gatherings with Friends and Family: Not on file    Attends Yazidism Services: Not on file    Active Member of [address] [place] [address] or Organizations: Not on file    Attends Club or Organization Meetings: Not on file    Marital Status: Not on file   Intimate Partner Violence:     Fear of Current or Ex-Partner: Not on file    Emotionally Abused: Not on file    Physically Abused: Not on file    Sexually Abused: Not on file   Housing Stability:     Unable to Pay for Housing in the Last Year: Not on file    Number of [place] in the Last Year: Not on file    Unstable Housing in the Last Year: Not on file       Review of Systems    No flowsheet data found. Vitals:  Ht 5' 10\" (1.778 m)   Wt 218 lb (98.9 kg)   BMI 31.28 kg/m²    Body mass index is 31.28 kg/m². An electronic signature was used to authenticate this note.   -- Ronaldo Gasca MD

## 2022-04-20 ENCOUNTER — OFFICE VISIT (OUTPATIENT)
Dept: ORTHOPEDIC SURGERY | Age: 62
End: 2022-04-20
Payer: COMMERCIAL

## 2022-04-20 VITALS — BODY MASS INDEX: 31.21 KG/M2 | HEIGHT: 70 IN | WEIGHT: 218 LBS

## 2022-04-20 DIAGNOSIS — M25.569 KNEE PAIN, UNSPECIFIED CHRONICITY, UNSPECIFIED LATERALITY: Primary | ICD-10-CM

## 2022-04-20 DIAGNOSIS — M17.11 OSTEOARTHRITIS OF RIGHT KNEE, UNSPECIFIED OSTEOARTHRITIS TYPE: ICD-10-CM

## 2022-04-20 PROCEDURE — 20610 DRAIN/INJ JOINT/BURSA W/O US: CPT | Performed by: ORTHOPAEDIC SURGERY

## 2022-04-20 PROCEDURE — 99214 OFFICE O/P EST MOD 30 MIN: CPT | Performed by: ORTHOPAEDIC SURGERY

## 2022-04-20 RX ORDER — BUPIVACAINE HYDROCHLORIDE 5 MG/ML
2 INJECTION, SOLUTION EPIDURAL; INTRACAUDAL ONCE
Status: COMPLETED | OUTPATIENT
Start: 2022-04-20 | End: 2022-04-20

## 2022-04-20 RX ORDER — TRIAMCINOLONE ACETONIDE 40 MG/ML
40 INJECTION, SUSPENSION INTRA-ARTICULAR; INTRAMUSCULAR ONCE
Status: COMPLETED | OUTPATIENT
Start: 2022-04-20 | End: 2022-04-20

## 2022-04-20 RX ADMIN — BUPIVACAINE HYDROCHLORIDE 10 MG: 5 INJECTION, SOLUTION EPIDURAL; INTRACAUDAL at 16:50

## 2022-04-20 RX ADMIN — TRIAMCINOLONE ACETONIDE 40 MG: 40 INJECTION, SUSPENSION INTRA-ARTICULAR; INTRAMUSCULAR at 16:51

## 2022-04-20 RX ADMIN — TRIAMCINOLONE ACETONIDE 40 MG: 40 INJECTION, SUSPENSION INTRA-ARTICULAR; INTRAMUSCULAR at 16:50

## 2022-05-12 ENCOUNTER — OFFICE VISIT (OUTPATIENT)
Dept: URGENT CARE | Age: 62
End: 2022-05-12
Payer: COMMERCIAL

## 2022-05-12 VITALS
HEART RATE: 65 BPM | SYSTOLIC BLOOD PRESSURE: 171 MMHG | TEMPERATURE: 98.3 F | BODY MASS INDEX: 31.71 KG/M2 | OXYGEN SATURATION: 97 % | DIASTOLIC BLOOD PRESSURE: 88 MMHG | RESPIRATION RATE: 16 BRPM | WEIGHT: 221 LBS

## 2022-05-12 DIAGNOSIS — R09.81 SINUS CONGESTION: ICD-10-CM

## 2022-05-12 DIAGNOSIS — Z20.822 EXPOSURE TO COVID-19 VIRUS: ICD-10-CM

## 2022-05-12 DIAGNOSIS — H61.23 BILATERAL IMPACTED CERUMEN: Primary | ICD-10-CM

## 2022-05-12 LAB — SARS-COV-2 PCR, POC: NEGATIVE

## 2022-05-12 PROCEDURE — 87635 SARS-COV-2 COVID-19 AMP PRB: CPT | Performed by: FAMILY MEDICINE

## 2022-05-12 PROCEDURE — 69209 REMOVE IMPACTED EAR WAX UNI: CPT | Performed by: FAMILY MEDICINE

## 2022-05-12 PROCEDURE — 99213 OFFICE O/P EST LOW 20 MIN: CPT | Performed by: FAMILY MEDICINE

## 2022-05-12 RX ORDER — MINERAL OIL
180 ENEMA (ML) RECTAL DAILY
Qty: 30 TABLET | Refills: 0 | Status: SHIPPED | OUTPATIENT
Start: 2022-05-12 | End: 2022-08-02 | Stop reason: ALTCHOICE

## 2022-05-12 RX ORDER — FLUTICASONE PROPIONATE 50 MCG
2 SPRAY, SUSPENSION (ML) NASAL DAILY
Qty: 1 EACH | Refills: 0 | Status: SHIPPED | OUTPATIENT
Start: 2022-05-12 | End: 2022-05-26

## 2022-05-12 NOTE — PROGRESS NOTES
Garth Callejas. is a 63 y.o. male who presents with sinus congestion x 2-3 weeks; now with ST and slight cough. Was exposed to COVID-19 4 days ago. Denies fever, SOB, N/V/D. The history is provided by the patient. Past Medical History:   Diagnosis Date    CAD (coronary artery disease) 2016    mild; calcium scoring score=46    GERD (gastroesophageal reflux disease)     Hypertension     Mixed hyperlipidemia     LDL goal <70    Prediabetes 09/2016    Rectal polyp 11/10/14    Rotator cuff tear, left     Dr. Wilder Boas Sleep disturbance         Past Surgical History:   Procedure Laterality Date    HX COLONOSCOPY  11/10/14    +rectal polyp (hyperplastic), Dr. Yani Carrero HX KNEE ARTHROSCOPY Right 2006    HX MENISCUS REPAIR Right 06/21/2016    Dr. Wilder Boas HX [place]ophas Starcher Right 2008    HX [place]  09/2016    Dr. Demetria Smith         Family History   Problem Relation Age of Onset    Heart Disease Father         CAD, age 72    Diabetes Father     Cancer Father         lung--smoker    Cancer Paternal Uncle         lung - smoker        Social History     Socioeconomic History    Marital status: SINGLE     Spouse name: single    Number of children: 2    Years of education: Not on file    Highest education level: Not on file   Occupational History    Occupation: [employer]    Tobacco Use    Smoking status: Never Smoker    Smokeless tobacco: Never Used   Vaping Use    Vaping Use: Never used   Substance and Sexual Activity    Alcohol use:  Yes     Alcohol/week: 3.0 - 5.0 standard drinks     Types: 3 - 5 Standard drinks or equivalent per week     Comment: social    Drug use: No    Sexual activity: Yes     Partners: Female     Comment: Garfield Collet in [facility]   Other Topics Concern    Not on file   Social History Narrative    Not on file     Social Determinants of Health     Financial Resource Strain:     Difficulty of Paying Living Expenses: Not on file   Food Insecurity:     Worried About Running Out of Food in the Last Year: Not on file    Jenna of Food in the Last Year: Not on file   Transportation Needs:     Lack of Transportation (Medical): Not on file    Lack of Transportation (Non-Medical): Not on file   Physical Activity:     Days of Exercise per Week: Not on file    Minutes of Exercise per Session: Not on file   Stress:     Feeling of Stress : Not on file   Social Connections:     Frequency of Communication with Friends and Family: Not on file    Frequency of Social Gatherings with Friends and Family: Not on file    Attends Hindu Services: Not on file    Active Member of [address] [place] [address] [employer] or Organizations: Not on file    Attends Club or Organization Meetings: Not on file    Marital Status: Not on file   Intimate Partner Violence:     Fear of Current or Ex-Partner: Not on file    Emotionally Abused: Not on file    Physically Abused: Not on file    Sexually Abused: Not on file   Housing Stability:     Unable to Pay for Housing in the Last Year: Not on file    Number of [place] in the Last Year: Not on file    Unstable Housing in the Last Year: Not on file                ALLERGIES: Bee sting [sting, bee]; Amlodipine; Demerol [meperidine]; and Lisinopril    Review of Systems   Constitutional: Negative for activity change, appetite change and fever. HENT: Positive for congestion and sore throat. Respiratory: Positive for cough. Negative for shortness of breath. Gastrointestinal: Negative for diarrhea, nausea and vomiting. Vitals:    05/12/22 1710   BP: (!) 171/88   Pulse: 65   Resp: 16   Temp: 98.3 °F (36.8 °C)   SpO2: 97%   Weight: 221 lb (100.2 kg)       Physical Exam  Vitals and nursing note reviewed. Constitutional:       General: He is not in acute distress. Appearance: He is well-developed. He is not diaphoretic.    HENT:      Right Ear: Tympanic membrane and ear canal normal. There is impacted cerumen (s/p irrigation). Left Ear: Tympanic membrane and ear canal normal. There is impacted cerumen (s/p irrigation). Nose: Congestion present. Right Sinus: No maxillary sinus tenderness or frontal sinus tenderness. Left Sinus: No maxillary sinus tenderness or frontal sinus tenderness. Mouth/Throat:      Mouth: Mucous membranes are moist.      Pharynx: Oropharynx is clear. No oropharyngeal exudate or posterior oropharyngeal erythema. Cardiovascular:      Rate and Rhythm: Normal rate and regular rhythm. Heart sounds: Normal heart sounds. Pulmonary:      Effort: Pulmonary effort is normal. No respiratory distress. Breath sounds: Normal breath sounds. No stridor. No wheezing, rhonchi or rales. Lymphadenopathy:      Cervical: No cervical adenopathy. Neurological:      Mental Status: He is alert. Psychiatric:         Behavior: Behavior normal.         Thought Content: Thought content normal.         Judgment: Judgment normal.         [facility]    ICD-10-CM ICD-9-CM   1. Bilateral impacted cerumen  H61.23 380.4   2. Sinus congestion  R09.81 478.19   3. Exposure to COVID-19 virus  Z20.822 V01.79       Orders Placed This Encounter    REMOVE IMPACTED EAR WAX    POCT COVID-19, SARS-COV-2, PCR     Order Specific Question:   Is this test for diagnosis or screening? Answer:   Diagnosis of ill patient     Order Specific Question:   Symptomatic for COVID-19 as defined by CDC? Answer:   Yes     Order Specific Question:   Date of Symptom Onset     Answer:   5/12/2022     Order Specific Question:   Hospitalized for COVID-19? Answer:   No     Order Specific Question:   Admitted to ICU for COVID-19? Answer:   No     Order Specific Question:   Employed in healthcare setting? Answer:   Unknown     Order Specific Question:   Resident in a congregate (group) care setting? Answer:   No     Order Specific Question:   Previously tested for COVID-19? Answer:    Yes    fluticasone propionate (FLONASE) 50 mcg/actuation nasal spray     Si Sprays by Both Nostrils route daily for 14 days. Dispense:  1 Each     Refill:  0    fexofenadine (ALLEGRA) 180 mg tablet     Sig: Take 1 Tablet by mouth daily. Dispense:  30 Tablet     Refill:  0      Start Flonase and Allegra daily  The patient is to follow up with PCP [facility]    If signs and symptoms become worse the pt is to go to the ER.      Results for orders placed or performed in visit on 22   POCT COVID-19, SARS-COV-2, PCR   Result Value Ref Range    SARS-COV-2 PCR, POC Negative Negative       Procedures

## 2022-05-18 ENCOUNTER — PATIENT MESSAGE (OUTPATIENT)
Dept: INTERNAL MEDICINE CLINIC | Age: 62
End: 2022-05-18

## 2022-05-18 DIAGNOSIS — K21.9 GASTROESOPHAGEAL REFLUX DISEASE WITHOUT ESOPHAGITIS: Primary | ICD-10-CM

## 2022-05-18 DIAGNOSIS — I10 ESSENTIAL HYPERTENSION, BENIGN: ICD-10-CM

## 2022-05-18 DIAGNOSIS — F51.01 PRIMARY INSOMNIA: ICD-10-CM

## 2022-05-18 RX ORDER — VALSARTAN AND HYDROCHLOROTHIAZIDE 160; 12.5 MG/1; MG/1
1 TABLET, FILM COATED ORAL DAILY
Qty: 90 TABLET | Refills: 1 | Status: SHIPPED | OUTPATIENT
Start: 2022-05-18

## 2022-05-18 RX ORDER — ZOLPIDEM TARTRATE 12.5 MG/1
TABLET, FILM COATED, EXTENDED RELEASE ORAL
Qty: 15 TABLET | Refills: 0 | Status: SHIPPED | OUTPATIENT
Start: 2022-05-18 | End: 2022-07-01 | Stop reason: SDUPTHER

## 2022-05-18 RX ORDER — OMEPRAZOLE 20 MG/1
20 TABLET, DELAYED RELEASE ORAL DAILY
Qty: 90 TABLET | Refills: 1 | Status: SHIPPED | OUTPATIENT
Start: 2022-05-18 | End: 2022-05-23

## 2022-05-23 ENCOUNTER — TELEPHONE (OUTPATIENT)
Dept: INTERNAL MEDICINE CLINIC | Age: 62
End: 2022-05-23

## 2022-05-23 DIAGNOSIS — K21.9 GASTROESOPHAGEAL REFLUX DISEASE WITHOUT ESOPHAGITIS: Primary | ICD-10-CM

## 2022-05-23 RX ORDER — OMEPRAZOLE 20 MG/1
20 CAPSULE, DELAYED RELEASE ORAL DAILY
Qty: 90 CAPSULE | Refills: 0 | Status: SHIPPED | OUTPATIENT
Start: 2022-05-23 | End: 2022-08-02 | Stop reason: SDUPTHER

## 2022-06-16 ENCOUNTER — OFFICE VISIT (OUTPATIENT)
Dept: SLEEP MEDICINE | Age: 62
End: 2022-06-16

## 2022-06-16 ENCOUNTER — HOSPITAL ENCOUNTER (OUTPATIENT)
Dept: SLEEP MEDICINE | Age: 62
Discharge: HOME | End: 2022-06-16
Payer: COMMERCIAL

## 2022-06-16 DIAGNOSIS — G47.33 OSA (OBSTRUCTIVE SLEEP APNEA): Primary | ICD-10-CM

## 2022-06-16 PROCEDURE — 95806 SLEEP STUDY UNATT&RESP EFFT: CPT | Performed by: INTERNAL MEDICINE

## 2022-06-17 ENCOUNTER — PATIENT MESSAGE (OUTPATIENT)
Dept: INTERNAL MEDICINE CLINIC | Age: 62
End: 2022-06-17

## 2022-06-17 DIAGNOSIS — Z00.00 ROUTINE CHECK-UP: Primary | ICD-10-CM

## 2022-06-17 DIAGNOSIS — E78.00 PURE HYPERCHOLESTEROLEMIA: ICD-10-CM

## 2022-06-17 DIAGNOSIS — I10 ESSENTIAL HYPERTENSION, BENIGN: ICD-10-CM

## 2022-06-20 NOTE — PROGRESS NOTES
S>Eddie Carrero. is a 63 y.o. male seen today to receive a home sleep testing unit (HST). · Patient was educated on proper hookup and operation of the HST via detailed instruction sheet (per COVID-19 precautions)  · Belts were fitted and adjusted for the patient during this session. · Instruction forms with after hours contact and documentation were signed. O>    There were no vitals taken for this visit. A>  No diagnosis found. P>  · General information regarding operations and maintenance of the device was provided. · Follow-up appointment was made to return the [facility] 6/17/22. He will be contacted once the results have been reviewed. · He was asked to contact our office for any problems regarding his home sleep test study.

## 2022-06-22 NOTE — TELEPHONE ENCOUNTER
----- Message from Didi Sanchez. sent at 6/22/2022 10:06 AM EDT -----  Regarding: data for medical insurance discount  I do believe I will need a lipid panel screen if there is a place for it on the form.   Gregorio Gallardo

## 2022-06-28 ENCOUNTER — OFFICE VISIT (OUTPATIENT)
Dept: ORTHOPEDIC SURGERY | Age: 62
End: 2022-06-28
Payer: COMMERCIAL

## 2022-06-28 VITALS — BODY MASS INDEX: 31.64 KG/M2 | HEIGHT: 70 IN | WEIGHT: 221 LBS

## 2022-06-28 DIAGNOSIS — M17.11 ARTHRITIS OF KNEE, RIGHT: Primary | ICD-10-CM

## 2022-06-28 PROCEDURE — 99214 OFFICE O/P EST MOD 30 MIN: CPT | Performed by: ORTHOPAEDIC SURGERY

## 2022-06-28 NOTE — PROGRESS NOTES
Catherine Duke (: 1960) is a 63 y.o. male patient, here for evaluation of the following chief complaint(s):  Knee Pain (bilateral knee pain)       ASSESSMENT/PLAN:  Below is the assessment and plan developed based on review of pertinent history, physical exam, labs, studies, and medications. 81-year-old male comes in today for evaluation of bilateral knee pain right worse than left. He has seen 1 my partners for many years. He has had knee scopes as well as injections and physical therapy. He has had 2 years of conservative treatment. All of his pain is medially based. His x-rays reveal isolated medial joint space narrowing with no remaining cartilage. We discussed partial knee replacement as an outpatient. Risks and benefits discussed. He would like to go ahead and move forward with scheduling. Risks and benefits of joint arthroplasty discussed at length including but not limited to bleeding, need for blood transfusion, infection, damage to surrounding structures, intra-operative fracture, blood clots, pulmonary embolism, death. The patient understands the risks of surgery. All questions answered. They elected to move forward. 1. Arthritis of knee, right      Encounter Diagnosis   Name Primary?  Arthritis of knee, right Yes        No follow-ups on file. SUBJECTIVE/OBJECTIVE:  Catherine Duke (: 1960) is a 63 y.o. male who presents today for the following:  Chief Complaint   Patient presents with    Knee Pain     bilateral knee pain       81-year-old male comes in today for evaluation of bilateral knee pain right worse than left. He has seen 1 my partners for many years. He has had knee scopes as well as injections and physical therapy. He has had 2 years of conservative treatment. All of his pain is medially based. He has intermittent swelling. He notices an occasional limp.     IMAGING:  XR Results (most recent):  I independently reviewed his x-rays that were taken by another physician. These reveal significant medial joint space narrowing with subchondral sclerosis present. Patellofemoral and lateral compartments well-preserved. There is medial osteophyte formation present. Results from Appointment encounter on 04/20/22    XR KNEES BI MIN 4 V    Narrative  X-rays were performed in the office today. For detailed interpretation of the x-ray findings please see the patient's office note. Allergies   Allergen Reactions    Bee Sting [Sting, Bee] Anaphylaxis    Amlodipine Other (comments)     Increased GERD    Demerol [Meperidine] Unknown (comments)     Doubles over in pain    Lisinopril Cough and Other (comments)     Fatigue         Current Outpatient Medications   Medication Sig    omeprazole (PRILOSEC) 20 mg capsule Take 1 Capsule by mouth daily.  zolpidem CR (AMBIEN CR) 12.5 mg tablet TAKE 1 TABLET BY MOUTH ONCE DAILY AS NEEDED  **APPT REQUIRED FOR FURTHER REFILLS**    valsartan-hydroCHLOROthiazide (DIOVAN-HCT) 160-12.5 mg per tablet Take 1 Tablet by mouth daily. DC valsartan,HCTZ    fexofenadine (ALLEGRA) 180 mg tablet Take 1 Tablet by mouth daily.  traZODone (DESYREL) 50 mg tablet Take 1 Tablet by mouth nightly.  naproxen (NAPROSYN) 500 mg tablet Take 1 Tab by mouth daily as needed for Pain.  cholecalciferol, vitamin D3, (VITAMIN D3) 2,000 unit tab Take  by mouth.  selenium 200 mcg Tab Take 400 mcg by mouth daily.  ascorbic acid (VITAMIN C) 500 mg tablet Take  by mouth. No current facility-administered medications for this visit.        Past Medical History:   Diagnosis Date    CAD (coronary artery disease) 2016    mild; calcium scoring score=46    GERD (gastroesophageal reflux disease)     Hypertension     Mixed hyperlipidemia     LDL goal <70    Prediabetes 09/2016    Rectal polyp 11/10/14    Rotator cuff tear, left     Dr. Claudia Herbert Sleep disturbance         Past Surgical History:   Procedure Laterality Date  HX COLONOSCOPY  11/10/14    +rectal polyp (hyperplastic), Dr. Kika Lopez HX KNEE ARTHROSCOPY Right 2006    HX MENISCUS REPAIR Right 06/21/2016    Dr. Gurpreet Jeffery HX MOHS PROCEDURES Right 2008    HX ROTATOR CUFF REPAIR  09/2016    Dr. Volodymyr Blanchard       Family History   Problem Relation Age of Onset    Heart Disease Father         CAD, age 72    Diabetes Father     Cancer Father         lung--smoker    Cancer Paternal Uncle         lung - smoker        Social History     Tobacco Use    Smoking status: Never Smoker    Smokeless tobacco: Never Used   Substance Use Topics    Alcohol use: Yes     Alcohol/week: 3.0 - 5.0 standard drinks     Types: 3 - 5 Standard drinks or equivalent per week     Comment: social        All systems reviewed x 12 and were negative with the exception of None      No flowsheet data found. Vitals:  Ht 5' 10\" (1.778 m)   Wt 221 lb (100.2 kg)   BMI 31.71 kg/m²    Body mass index is 31.71 kg/m². Physical Exam    General: NAD, well developed, well nourished, alert and oriented x 3. Cardiac: Extremities well perfused    Respiratory: Nonlabored breathing    LLE: Normal gait and station. Negative stinchfield. No effusion noted. No previous incisions noted. ROM 0-120 degrees. Grossly stable to varus/valgus stress and anterior/posterior drawer tests. Negative Mc[place]rays. Motor grossly intact. RLE: Mild antalgic gait. Mild effusion noted. No previous incisions noted. ROM 0-120 degrees. Grossly stable to varus/valgus stress and anterior/posterior drawer tests. Significant medial joint tenderness. No lateral tenderness. Motor grossly intact. Vascular: Palpable pedal pulses, equal bilaterally. Skin: Warm well perfused, cap refill < 2 sec. An electronic signature was used to authenticate this note.   -- Ashia Estrada MD

## 2022-06-29 ENCOUNTER — TELEPHONE (OUTPATIENT)
Dept: SLEEP MEDICINE | Age: 62
End: 2022-06-29

## 2022-06-29 ENCOUNTER — DOCUMENTATION ONLY (OUTPATIENT)
Dept: SLEEP MEDICINE | Age: 62
End: 2022-06-29

## 2022-06-29 DIAGNOSIS — G47.33 OBSTRUCTIVE SLEEP APNEA (ADULT) (PEDIATRIC): Primary | ICD-10-CM

## 2022-06-29 NOTE — TELEPHONE ENCOUNTER
Results reviewed with patient via [employer]    Order PAP and call patient and let them know which DME company they should be hearing from. Schedule for first adherence visit in 6 weeks.

## 2022-06-30 LAB
CHOLEST SERPL-MCNC: 148 MG/DL (ref 100–199)
HDLC SERPL-MCNC: 41 MG/DL
IMP & REVIEW OF LAB RESULTS: NORMAL
LDLC SERPL CALC-MCNC: 67 MG/DL (ref 0–99)
TRIGL SERPL-MCNC: 244 MG/DL (ref 0–149)
VLDLC SERPL CALC-MCNC: 40 MG/DL (ref 5–40)

## 2022-07-01 ENCOUNTER — DOCUMENTATION ONLY (OUTPATIENT)
Dept: SLEEP MEDICINE | Age: 62
End: 2022-07-01

## 2022-07-01 DIAGNOSIS — M17.11 OSTEOARTHRITIS OF RIGHT KNEE, UNSPECIFIED OSTEOARTHRITIS TYPE: Primary | ICD-10-CM

## 2022-07-01 DIAGNOSIS — F51.01 PRIMARY INSOMNIA: ICD-10-CM

## 2022-07-01 RX ORDER — NAPROXEN 500 MG/1
500 TABLET ORAL
Qty: 30 TABLET | Refills: 0 | Status: SHIPPED | OUTPATIENT
Start: 2022-07-01 | End: 2022-10-21

## 2022-07-01 RX ORDER — ZOLPIDEM TARTRATE 12.5 MG/1
TABLET, FILM COATED, EXTENDED RELEASE ORAL
Qty: 15 TABLET | Refills: 0 | Status: SHIPPED | OUTPATIENT
Start: 2022-07-01 | End: 2022-08-02 | Stop reason: SDUPTHER

## 2022-07-01 NOTE — TELEPHONE ENCOUNTER
Patient phones office asking if we could send in some Naproxen for him. I have discussed this with Dr. Beata Man and he is good with this plan.

## 2022-07-06 ENCOUNTER — PATIENT MESSAGE (OUTPATIENT)
Dept: INTERNAL MEDICINE CLINIC | Age: 62
End: 2022-07-06

## 2022-07-12 DIAGNOSIS — M17.11 ARTHRITIS OF KNEE, RIGHT: Primary | ICD-10-CM

## 2022-07-28 ENCOUNTER — OFFICE VISIT (OUTPATIENT)
Dept: URGENT CARE | Age: 62
End: 2022-07-28
Payer: COMMERCIAL

## 2022-07-28 VITALS
RESPIRATION RATE: 16 BRPM | HEART RATE: 80 BPM | HEIGHT: 70 IN | SYSTOLIC BLOOD PRESSURE: 138 MMHG | DIASTOLIC BLOOD PRESSURE: 83 MMHG | BODY MASS INDEX: 31.64 KG/M2 | TEMPERATURE: 98.3 F | OXYGEN SATURATION: 96 % | WEIGHT: 221 LBS

## 2022-07-28 DIAGNOSIS — Z96.651 STATUS POST RIGHT UNICOMPARTMENTAL KNEE REPLACEMENT: Primary | ICD-10-CM

## 2022-07-28 DIAGNOSIS — B97.89 VIRAL SINUSITIS: ICD-10-CM

## 2022-07-28 DIAGNOSIS — J32.9 VIRAL SINUSITIS: ICD-10-CM

## 2022-07-28 DIAGNOSIS — U07.1 COVID-19: Primary | ICD-10-CM

## 2022-07-28 LAB — SARS-COV-2 PCR, POC: POSITIVE

## 2022-07-28 PROCEDURE — 99213 OFFICE O/P EST LOW 20 MIN: CPT | Performed by: NURSE PRACTITIONER

## 2022-07-28 PROCEDURE — 87635 SARS-COV-2 COVID-19 AMP PRB: CPT | Performed by: NURSE PRACTITIONER

## 2022-07-28 RX ORDER — ATORVASTATIN CALCIUM 20 MG/1
20 TABLET, FILM COATED ORAL DAILY
COMMUNITY
Start: 2022-06-03 | End: 2022-08-30

## 2022-07-28 RX ORDER — NIRMATRELVIR AND RITONAVIR 300-100 MG
3 KIT ORAL EVERY 12 HOURS
Qty: 1 BOX | Refills: 0 | Status: SHIPPED | OUTPATIENT
Start: 2022-07-28 | End: 2022-08-02

## 2022-07-28 RX ORDER — PREDNISONE 20 MG/1
TABLET ORAL
Qty: 10 TABLET | Refills: 0 | Status: SHIPPED | OUTPATIENT
Start: 2022-07-28 | End: 2022-07-28

## 2022-07-28 NOTE — PROGRESS NOTES
Subjective: (As above and below)     The patient/guardian gave verbal consent to treat. Chief Complaint   Patient presents with    Concern For COVID-19 (Coronavirus)     Pt. C/o chest congestion, sinus issues and sore throat starting tues. Denies exposure     Maris Velasco is a 63 y.o. male who presents for evaluation of : nasal congestion, sinus pressure/pain, occasional cough. Symptom onset 2 days ago . Preceding illness: none. No other identified aggravating or alleviating factors. Symptoms are constant and overall not resolved. Promotes no decrease in PO intake of fluids. Denies: severe lethargy, SOB, vomiting/diarrhea, chest pain, chest pain with breathing, severe headache,  fevers . Known Exposure to COVID-19: no      ROS  Review of Systems - negative except as listed above    Reviewed PmHx, RxHx, FmHx, SocHx, AllgHx and updated in chart. Family History   Problem Relation Age of Onset    Heart Disease Father         CAD, age 72    Diabetes Father     Cancer Father         lung--smoker    Cancer Paternal Uncle         lung - smoker       Past Medical History:   Diagnosis Date    CAD (coronary artery disease) 2016    mild; calcium scoring score=46    GERD (gastroesophageal reflux disease)     Hypertension     Mixed hyperlipidemia     LDL goal <70    Prediabetes 09/2016    Rectal polyp 11/10/14    Rotator cuff tear, left     Dr. Delgado Albright    Sleep disturbance       Social History     Socioeconomic History    Marital status: SINGLE     Spouse name: single    Number of children: 2   Occupational History    Occupation: [employer]--hourly    Tobacco Use    Smoking status: Never    Smokeless tobacco: Never   Vaping Use    Vaping Use: Never used   Substance and Sexual Activity    Alcohol use:  Yes     Alcohol/week: 3.0 - 5.0 standard drinks     Types: 3 - 5 Standard drinks or equivalent per week     Comment: social    Drug use: No    Sexual activity: Yes     Partners: Female Comment: single,2 daughters,working in [employer]          Current Outpatient Medications   Medication Sig    nirmatrelvir-ritonavir (Paxlovid, EUA,) 300 mg (150 mg x 2)-100 mg tablet Take 3 Tablets by mouth every twelve (12) hours for 5 days. naproxen (NAPROSYN) 500 mg tablet Take 1 Tablet by mouth daily as needed for Pain.    zolpidem CR (AMBIEN CR) 12.5 mg tablet Take for sleep as needed nightly    omeprazole (PRILOSEC) 20 mg capsule Take 1 Capsule by mouth daily. valsartan-hydroCHLOROthiazide (DIOVAN-HCT) 160-12.5 mg per tablet Take 1 Tablet by mouth daily. DC valsartan,HCTZ    fexofenadine (ALLEGRA) 180 mg tablet Take 1 Tablet by mouth daily. traZODone (DESYREL) 50 mg tablet Take 1 Tablet by mouth nightly. cholecalciferol, vitamin D3, 50 mcg (2,000 unit) tab Take  by mouth. selenium 200 mcg Tab Take 400 mcg by mouth daily. ascorbic acid, vitamin C, (VITAMIN C) 500 mg tablet Take  by mouth. atorvastatin (LIPITOR) 20 mg tablet Take 20 mg by mouth in the morning. No current facility-administered medications for this visit. Objective:     Vitals:    07/28/22 1026   BP: 138/83   Pulse: 80   Resp: 16   Temp: 98.3 °F (36.8 °C)   SpO2: 96%   Weight: 221 lb (100.2 kg)   Height: 5' 10\" (1.778 m)       Physical Exam  General appearance - appears well hydrated and does not appear toxic, no acute distress  Eyes - EOMs intact. Non injected. No scleral icterus   Ears - no external swelling. TMs normal bilat. Nose - nasal congestion. No purulent drainage  Mouth - OP clear without swelling, exudate or lesion. Mucus membranes moist. Uvula midline. Neck/Lymphatics - trachea midline, full AROM, no LAD of neck  Chest - Normal breathing effort no wheeze rales, rhonchi or diminishments bilaterally. Heart - RRR, no murmurs  Skin - no observable rashes or pallor  Neurologic- alert and oriented x 3  Psychiatric- normal mood, behavior and though content. Assessment/ Plan:     1.  Viral upper respiratory tract infection    - POCT COVID-19, SARS-COV-2, PCR    2. Acute sinusitis, recurrence not specified, unspecified location    - predniSONE (DELTASONE) 20 mg tablet; Take 2 tabs by mouth one time daily with food for 5 days. Dispense: 10 Tablet; Refill: 0  - POCT COVID-19, SARS-COV-2, PCR      Covid 19 test result today POSITIVE  Informed decision to start paxlovid   May also try nasal saline sprays and OTC mucinex original formulation  No evidence suggesting complication of illness at this time. Will discharge home with close monitoring and follow up. Supportive home care for mild symptoms advised- maintain adequate fluid intake, over the counter Tylenol (for fever, aches, pains, chills), deep breathing exercises  CDC quarantine guidelines reviewed with patient      Test Results:  Recent Results (from the past 6 hour(s))   POCT COVID-19, SARS-COV-2, PCR    Collection Time: 07/28/22 10:59 AM   Result Value Ref Range    SARS-COV-2 PCR, POC Positive (A) Negative       Follow up: Follow up immediately for any new, worsening or changes or if symptoms are not improving over the next 5-7 days.          Bindu James, [place]

## 2022-08-02 ENCOUNTER — VIRTUAL VISIT (OUTPATIENT)
Dept: INTERNAL MEDICINE CLINIC | Age: 62
End: 2022-08-02

## 2022-08-02 ENCOUNTER — OFFICE VISIT (OUTPATIENT)
Dept: URGENT CARE | Age: 62
End: 2022-08-02
Payer: COMMERCIAL

## 2022-08-02 VITALS — HEART RATE: 77 BPM | RESPIRATION RATE: 16 BRPM | OXYGEN SATURATION: 98 % | TEMPERATURE: 97.9 F

## 2022-08-02 DIAGNOSIS — G47.33 OSA (OBSTRUCTIVE SLEEP APNEA): ICD-10-CM

## 2022-08-02 DIAGNOSIS — G47.00 INSOMNIA, UNSPECIFIED TYPE: ICD-10-CM

## 2022-08-02 DIAGNOSIS — Z20.822 ENCOUNTER FOR LABORATORY TESTING FOR COVID-19 VIRUS: Primary | ICD-10-CM

## 2022-08-02 DIAGNOSIS — I10 PRIMARY HYPERTENSION: ICD-10-CM

## 2022-08-02 DIAGNOSIS — U07.1 COVID-19: Primary | ICD-10-CM

## 2022-08-02 DIAGNOSIS — Z01.818 PRE-OPERATIVE CLEARANCE: ICD-10-CM

## 2022-08-02 DIAGNOSIS — M17.11 PRIMARY OSTEOARTHRITIS OF RIGHT KNEE: ICD-10-CM

## 2022-08-02 DIAGNOSIS — F51.01 PRIMARY INSOMNIA: ICD-10-CM

## 2022-08-02 DIAGNOSIS — K21.9 GASTROESOPHAGEAL REFLUX DISEASE WITHOUT ESOPHAGITIS: ICD-10-CM

## 2022-08-02 LAB — SARS-COV-2 PCR, POC: POSITIVE

## 2022-08-02 PROCEDURE — 99211 OFF/OP EST MAY X REQ PHY/QHP: CPT | Performed by: FAMILY MEDICINE

## 2022-08-02 PROCEDURE — 87635 SARS-COV-2 COVID-19 AMP PRB: CPT | Performed by: FAMILY MEDICINE

## 2022-08-02 PROCEDURE — 99214 OFFICE O/P EST MOD 30 MIN: CPT | Performed by: INTERNAL MEDICINE

## 2022-08-02 RX ORDER — OMEPRAZOLE 20 MG/1
20 CAPSULE, DELAYED RELEASE ORAL DAILY
Qty: 90 CAPSULE | Refills: 0 | Status: SHIPPED | OUTPATIENT
Start: 2022-08-02 | End: 2022-10-21

## 2022-08-02 RX ORDER — ZOLPIDEM TARTRATE 12.5 MG/1
TABLET, FILM COATED, EXTENDED RELEASE ORAL
Qty: 15 TABLET | Refills: 0 | Status: SHIPPED | OUTPATIENT
Start: 2022-08-02 | End: 2022-09-30

## 2022-08-02 NOTE — PROGRESS NOTES
Yamileth Echols. is a 63 y.o. male who was seen by synchronous (real-time) audio-video technology on 8/2/2022 for Hypertension, Cholesterol Problem, Insomnia, GERD, Follow Up Chronic Condition, and Positive For Covid-19        Assessment & Plan:   Diagnoses and all orders for this visit:    1. COVID-19  He is diagnosed with COVID-19 infection on 728. Started on Paxil weight. Not able to take any other medication. Today he just took last dose of Paxil for it. Still having nasal congestion and mild cough. No shortness of breath or wheezing. He has tested again, came back positive. Advised him to rest drink a lot of fluid and take vitamin C D and zinc tablet. He should go back to work after 2 days. 2. Primary hypertension  Elevated blood pressure since patient is off of Diovan hydrochlorothiazide for an past 5 days. Advised to restart back it tomorrow morning. 3. Pre-operative clearance  He is going to have right knee partial replacement done by Dr. Royal Staci pillai. He is very fit and active. Has no family history of coronary artery disease. He has no coronary disease or shortness of breath. He will have partial knee replacement done in couple of weeks. We can fill out his form based on his previous visit. But patient will need lab work and EKG for surgery. 4. Primary osteoarthritis of right knee  Scheduled to have partial knee replacement done by Dr. Royal Staci pillai. 5. DALIA (obstructive sleep apnea)  Seen by Dr. Francisca Morrison. Plan to start on CPAP machine. Did not receive the machine yet. Advised him to call Dr. Gaurav Houston office and get [email or web address] company's name to call and follow-up with them. Taking Ambien as needed. Received prescription for this month. 6. Insomnia, unspecified type  Taking Ambien as needed. Need to start CPAP machine. I spent at least 30 minutes on this visit with this established patient. Subjective:   Candace Pham is here for follow-up.   He is diagnosed with COVID-19 infection 5 days back. He was started on Paxil bid. Able to tolerate medicine well. He is not taking cholesterol medicine blood pressure medicine because of Paxil weight. Noticed to have elevated blood pressure. No headache. Still having mild nasal congestion and mild cough. And he has tested COVID home test which came back again positive. He is not ready to go back to work tomorrow. Feeling tired since COVID infection. He has DJD in the knee. As scheduled to have knee partial knee replacement in 2 weeks. Would like to get preoperative clearance paper done. Has no history of coronary artery disease no shortness of breath or orthopnea. Has no family history of coronary disease. Suffer from insomnia. Has seen sleep specialist, diagnosed with sleep apnea. Waiting for CPAP machine. Taking Ambien as needed. Has elevated lipids, on statin. No myalgia. Labs reviewed. Seems stable. Prior to Admission medications    Medication Sig Start Date End Date Taking? Authorizing Provider   atorvastatin (LIPITOR) 20 mg tablet Take 20 mg by mouth in the morning. 6/3/22  Yes Provider, Historical   nirmatrelvir-ritonavir (Paxlovid, EUA,) 300 mg (150 mg x 2)-100 mg tablet Take 3 Tablets by mouth every twelve (12) hours for 5 days. 7/28/22 8/2/22 Yes Erin Brenner NP   naproxen (NAPROSYN) 500 mg tablet Take 1 Tablet by mouth daily as needed for Pain. 7/1/22  Yes Nolvia Doshi MD   zolpidem CR (AMBIEN CR) 12.5 mg tablet Take for sleep as needed nightly 7/1/22  Yes Shelton Rivera NP   omeprazole (PRILOSEC) 20 mg capsule Take 1 Capsule by mouth daily. 5/23/22  Yes Sridevi Matute MD   valsartan-hydroCHLOROthiazide (DIOVAN-HCT) 160-12.5 mg per tablet Take 1 Tablet by mouth daily. DOMINGUEZ valsartan,HCTZ 5/18/22  Yes Sridevi Matute MD   cholecalciferol, vitamin D3, 50 mcg (2,000 unit) tab Take  by mouth. Yes Provider, Historical   selenium 200 mcg Tab Take 400 mcg by mouth daily.    Yes Provider, Historical   ascorbic acid, vitamin C, (VITAMIN C) 500 mg tablet Take  by mouth. Yes Provider, Historical   fexofenadine (ALLEGRA) 180 mg tablet Take 1 Tablet by mouth daily. 5/12/22 8/2/22  Tammy Block MD   traZODone (DESYREL) 50 mg tablet Take 1 Tablet by mouth nightly. 7/26/21 8/2/22  Brandee Gutierrez MD     Past Medical History:   Diagnosis Date    CAD (coronary artery disease) 2016    mild; calcium scoring score=46    GERD (gastroesophageal reflux disease)     Hypertension     Mixed hyperlipidemia     LDL goal <70    Prediabetes 09/2016    Rectal polyp 11/10/14    Rotator cuff tear, left     Dr. Tatianna Corral    Sleep disturbance        ROS significant for insomnia and nasal congestion and fatigue. Objective:     Patient-Reported Vitals 8/2/2022   Patient-Reported Weight 215   Patient-Reported Pulse 64   Patient-Reported Temperature 98.3   Patient-Reported Systolic  704   Patient-Reported Diastolic 85            Constitutional: [x] Appears well-developed and well-nourished [x] No apparent distress      [] Abnormal -     Mental status: [x] Alert and awake  [x] Oriented to person/place/time [x] Able to follow commands    [] Abnormal -     Eyes:   EOM    [x]  Normal    [] Abnormal -   Sclera  [x]  Normal    [] Abnormal -          Discharge [x]  None visible   [] Abnormal -     HENT: [x] Normocephalic, atraumatic  [] Abnormal -   [x] Mouth/Throat: Mucous membranes are moist    External Ears [x] Normal  [] Abnormal -  Nasal congestion present. Neck: [x] No visualized mass [] Abnormal -     Pulmonary/Chest: [x] Respiratory effort normal   [x] No visualized signs of difficulty breathing or respiratory distress        [] Abnormal -      Musculoskeletal:   [x] Normal gait with no signs of ataxia   Right knee pain and tenderness present. Range of motion mildly restricted.   Neurological:        [x] No Facial Asymmetry (Cranial nerve 7 motor function) (limited exam due to video visit)          [x] No gaze palsy        [] Abnormal -          Skin: [x] No significant exanthematous lesions or discoloration noted on facial skin         [] Abnormal -            Psychiatric:       [x] Normal Affect [] Abnormal -        [x] No Hallucinations    Other pertinent observable physical exam findings:-        We discussed the expected course, resolution and complications of the diagnosis(es) in detail. Medication risks, benefits, costs, interactions, and alternatives were discussed as indicated. I advised him to contact the office if his condition worsens, changes or fails to improve as anticipated. He expressed understanding with the diagnosis(es) and plan. Tita Sierra, was evaluated through a synchronous (real-time) audio-video encounter. The patient (or guardian if applicable) is aware that this is a billable service, which includes applicable co-pays. This Virtual Visit was conducted with patient's (and/or legal guardian's) consent. The visit was conducted pursuant to the emergency declaration under the [address] [place] [address] and the [employer] and [employer] General Act. Patient identification was verified, and a caregiver was present when appropriate.   The patient was located at: Home: [place] [phone]  The provider was located at: Home: [email or web address]        Payal Dickson MD

## 2022-08-05 ENCOUNTER — OFFICE VISIT (OUTPATIENT)
Dept: URGENT CARE | Age: 62
End: 2022-08-05
Payer: COMMERCIAL

## 2022-08-05 VITALS — OXYGEN SATURATION: 97 % | TEMPERATURE: 98.7 F | RESPIRATION RATE: 16 BRPM | HEART RATE: 82 BPM

## 2022-08-05 DIAGNOSIS — Z20.822 ENCOUNTER FOR LABORATORY TESTING FOR COVID-19 VIRUS: Primary | ICD-10-CM

## 2022-08-05 LAB — SARS-COV-2 PCR, POC: NEGATIVE

## 2022-08-05 PROCEDURE — 99211 OFF/OP EST MAY X REQ PHY/QHP: CPT | Performed by: NURSE PRACTITIONER

## 2022-08-05 PROCEDURE — 87635 SARS-COV-2 COVID-19 AMP PRB: CPT | Performed by: NURSE PRACTITIONER

## 2022-08-11 ENCOUNTER — TELEPHONE (OUTPATIENT)
Dept: INTERNAL MEDICINE CLINIC | Age: 62
End: 2022-08-11

## 2022-08-11 ENCOUNTER — DOCUMENTATION ONLY (OUTPATIENT)
Dept: INTERNAL MEDICINE CLINIC | Age: 62
End: 2022-08-11

## 2022-08-11 DIAGNOSIS — Z01.818 PRE-OP EVALUATION: Primary | ICD-10-CM

## 2022-08-15 ENCOUNTER — CLINICAL SUPPORT (OUTPATIENT)
Dept: INTERNAL MEDICINE CLINIC | Age: 62
End: 2022-08-15
Payer: COMMERCIAL

## 2022-08-15 DIAGNOSIS — R35.0 URINE FREQUENCY: Primary | ICD-10-CM

## 2022-08-15 PROCEDURE — 81001 URINALYSIS AUTO W/SCOPE: CPT | Performed by: INTERNAL MEDICINE

## 2022-08-16 ENCOUNTER — HOSPITAL ENCOUNTER (OUTPATIENT)
Dept: NON INVASIVE DIAGNOSTICS | Age: 62
Discharge: HOME | End: 2022-08-16
Payer: COMMERCIAL

## 2022-08-16 ENCOUNTER — OFFICE VISIT (OUTPATIENT)
Dept: INTERNAL MEDICINE CLINIC | Age: 62
End: 2022-08-16

## 2022-08-16 DIAGNOSIS — Z01.818 PRE-OP EVALUATION: ICD-10-CM

## 2022-08-16 LAB
APPEARANCE UR: CLEAR
BACTERIA #/AREA URNS HPF: NORMAL /[HPF]
BASOPHILS # BLD AUTO: 0 X10E3/UL (ref 0–0.2)
BASOPHILS NFR BLD AUTO: 1 %
BILIRUB UR QL STRIP: NEGATIVE
BILIRUB UR QL STRIP: NEGATIVE
BUN SERPL-MCNC: 17 MG/DL (ref 8–27)
BUN/CREAT SERPL: 20 (ref 10–24)
CALCIUM SERPL-MCNC: 8.9 MG/DL (ref 8.6–10.2)
CASTS URNS QL MICRO: NORMAL /LPF
CHLORIDE SERPL-SCNC: 98 MMOL/L (ref 96–106)
CO2 SERPL-SCNC: 23 MMOL/L (ref 20–29)
COLOR UR: YELLOW
CREAT SERPL-MCNC: 0.84 MG/DL (ref 0.76–1.27)
EGFR: 100 ML/MIN/1.73
EOSINOPHIL # BLD AUTO: 0.2 X10E3/UL (ref 0–0.4)
EOSINOPHIL NFR BLD AUTO: 3 %
EPI CELLS #/AREA URNS HPF: NORMAL /HPF (ref 0–10)
ERYTHROCYTE [DISTWIDTH] IN BLOOD BY AUTOMATED COUNT: 13 % (ref 11.6–15.4)
EST. AVERAGE GLUCOSE BLD GHB EST-MCNC: 117 MG/DL
GLUCOSE SERPL-MCNC: 96 MG/DL (ref 65–99)
GLUCOSE UR QL STRIP: NEGATIVE
GLUCOSE UR-MCNC: NEGATIVE MG/DL
HBA1C MFR BLD: 5.7 % (ref 4.8–5.6)
HCT VFR BLD AUTO: 39.8 % (ref 37.5–51)
HGB BLD-MCNC: 14.1 G/DL (ref 13–17.7)
HGB UR QL STRIP: NEGATIVE
IMM GRANULOCYTES # BLD AUTO: 0 X10E3/UL (ref 0–0.1)
IMM GRANULOCYTES NFR BLD AUTO: 0 %
KETONES P FAST UR STRIP-MCNC: NEGATIVE MG/DL
KETONES UR QL STRIP: NEGATIVE
LEUKOCYTE ESTERASE UR QL STRIP: NEGATIVE
LYMPHOCYTES # BLD AUTO: 1.2 X10E3/UL (ref 0.7–3.1)
LYMPHOCYTES NFR BLD AUTO: 20 %
MCH RBC QN AUTO: 32.6 PG (ref 26.6–33)
MCHC RBC AUTO-ENTMCNC: 35.4 G/DL (ref 31.5–35.7)
MCV RBC AUTO: 92 FL (ref 79–97)
MICRO URNS: ABNORMAL
MICRO URNS: ABNORMAL
MONOCYTES # BLD AUTO: 0.7 X10E3/UL (ref 0.1–0.9)
MONOCYTES NFR BLD AUTO: 12 %
NEUTROPHILS # BLD AUTO: 3.7 X10E3/UL (ref 1.4–7)
NEUTROPHILS NFR BLD AUTO: 64 %
NITRITE UR QL STRIP: NEGATIVE
PH UR STRIP: 5 [PH] (ref 4.6–8)
PH UR STRIP: 5.5 [PH] (ref 5–7.5)
PLATELET # BLD AUTO: 163 X10E3/UL (ref 150–450)
POTASSIUM SERPL-SCNC: 3.7 MMOL/L (ref 3.5–5.2)
PROT UR QL STRIP: ABNORMAL
PROT UR QL STRIP: NEGATIVE
RBC # BLD AUTO: 4.32 X10E6/UL (ref 4.14–5.8)
RBC #/AREA URNS HPF: NORMAL /HPF (ref 0–2)
SODIUM SERPL-SCNC: 138 MMOL/L (ref 134–144)
SP GR UR STRIP: 1.03 (ref 1–1.03)
SP GR UR STRIP: >=1.03 (ref 1–1.03)
UA UROBILINOGEN AMB POC: NORMAL (ref 0.2–1)
URINALYSIS CLARITY POC: CLEAR
URINALYSIS COLOR POC: YELLOW
URINALYSIS REFLEX, 377202: ABNORMAL
URINE BLOOD POC: NEGATIVE
URINE LEUKOCYTES POC: NEGATIVE
URINE NITRITES POC: NEGATIVE
UROBILINOGEN UR STRIP-MCNC: 0.2 MG/DL (ref 0.2–1)
WBC # BLD AUTO: 5.8 X10E3/UL (ref 3.4–10.8)
WBC #/AREA URNS HPF: NORMAL /HPF (ref 0–5)

## 2022-08-16 PROCEDURE — 93005 ELECTROCARDIOGRAM TRACING: CPT

## 2022-08-16 NOTE — PROGRESS NOTES
A1c more elevated, 5.7. Watch diet for foods high in sugar. Recommend regular exercise, as tolerated.

## 2022-08-17 DIAGNOSIS — Z98.890 STATUS POST SURGERY: Primary | ICD-10-CM

## 2022-08-17 LAB
ATRIAL RATE: 73 BPM
CALCULATED P AXIS, ECG09: 23 DEGREES
CALCULATED R AXIS, ECG10: 12 DEGREES
CALCULATED T AXIS, ECG11: 23 DEGREES
DIAGNOSIS, 93000: NORMAL
P-R INTERVAL, ECG05: 180 MS
Q-T INTERVAL, ECG07: 398 MS
QRS DURATION, ECG06: 88 MS
QTC CALCULATION (BEZET), ECG08: 438 MS
VENTRICULAR RATE, ECG03: 73 BPM

## 2022-08-17 RX ORDER — TRAMADOL HYDROCHLORIDE 50 MG/1
50 TABLET ORAL
Qty: 42 TABLET | Refills: 0 | Status: SHIPPED | OUTPATIENT
Start: 2022-08-17 | End: 2022-09-01

## 2022-08-17 RX ORDER — NALOXONE HYDROCHLORIDE 4 MG/.1ML
SPRAY NASAL
Qty: 1 EACH | Refills: 0 | Status: SHIPPED | OUTPATIENT
Start: 2022-08-17 | End: 2022-09-16

## 2022-08-17 RX ORDER — OXYCODONE HYDROCHLORIDE 5 MG/1
5 TABLET ORAL
Qty: 42 TABLET | Refills: 0 | Status: SHIPPED | OUTPATIENT
Start: 2022-08-17 | End: 2022-08-27

## 2022-08-17 RX ORDER — GUAIFENESIN 100 MG/5ML
81 LIQUID (ML) ORAL 2 TIMES DAILY
Qty: 60 TABLET | Refills: 0 | Status: SHIPPED | OUTPATIENT
Start: 2022-08-17 | End: 2022-10-21

## 2022-08-17 RX ORDER — FAMOTIDINE 20 MG/1
20 TABLET, FILM COATED ORAL 2 TIMES DAILY
Qty: 60 TABLET | Refills: 0 | Status: SHIPPED | OUTPATIENT
Start: 2022-08-17 | End: 2022-10-21

## 2022-08-17 RX ORDER — MELOXICAM 7.5 MG/1
7.5 TABLET ORAL DAILY
Qty: 30 TABLET | Refills: 1 | Status: SHIPPED | OUTPATIENT
Start: 2022-08-17

## 2022-08-30 RX ORDER — ATORVASTATIN CALCIUM 20 MG/1
TABLET, FILM COATED ORAL
Qty: 90 TABLET | Refills: 0 | Status: SHIPPED | OUTPATIENT
Start: 2022-08-30

## 2022-09-12 ENCOUNTER — DOCUMENTATION ONLY (OUTPATIENT)
Dept: ORTHOPEDIC SURGERY | Age: 62
End: 2022-09-12

## 2022-09-14 ENCOUNTER — TELEPHONE (OUTPATIENT)
Dept: INTERNAL MEDICINE CLINIC | Age: 62
End: 2022-09-14

## 2022-09-14 NOTE — TELEPHONE ENCOUNTER
Per Anselmo Steel MD patient is to watch salt intake and monitor BP over the next week.      Venus Taylor Jr.'s RN team was called, no VW available/phone was busy, MA will call back again

## 2022-09-14 NOTE — TELEPHONE ENCOUNTER
Spoke with patient, no symptoms currently, will monitor salt intake and follow up with any concerns.

## 2022-09-16 ENCOUNTER — OFFICE VISIT (OUTPATIENT)
Dept: ORTHOPEDIC SURGERY | Age: 62
End: 2022-09-16
Payer: COMMERCIAL

## 2022-09-16 VITALS — HEIGHT: 70 IN | BODY MASS INDEX: 31.64 KG/M2 | WEIGHT: 221 LBS

## 2022-09-16 DIAGNOSIS — Z96.651 STATUS POST RIGHT UNICOMPARTMENTAL KNEE REPLACEMENT: Primary | ICD-10-CM

## 2022-09-16 PROCEDURE — 99024 POSTOP FOLLOW-UP VISIT: CPT | Performed by: PHYSICIAN ASSISTANT

## 2022-09-16 RX ORDER — DICLOFENAC SODIUM 75 MG/1
75 TABLET, DELAYED RELEASE ORAL 2 TIMES DAILY WITH MEALS
Qty: 60 TABLET | Refills: 0 | Status: SHIPPED | OUTPATIENT
Start: 2022-09-16

## 2022-09-16 NOTE — LETTER
NOTIFICATION RETURN TO WORK / SCHOOL    9/16/2022 12:31 PM    Mr. Manolo Barrera. [facility] [address] [phone]      To Whom It May Concern:    Manolo Barrera. is currently under the care of Nicko Saez. He will return to work/school on: 10/1/22    If there are questions or concerns please have the patient contact our office.         Sincerely,      Damari Acevedo PA-C

## 2022-09-16 NOTE — LETTER
NOTIFICATION RETURN TO WORK / SCHOOL    9/16/2022 12:20 PM    Mr. Taz Heart. [facility] [address] [phone]      To Whom It May Concern:    Taz Barnett is currently under the care of [facility]. He will return to work/school on: 10/3/22    If there are questions or concerns please have the patient contact our office.         Sincerely,      Sachi Durham PA-C

## 2022-09-16 NOTE — LETTER
NOTIFICATION RETURN TO WORK / SCHOOL    9/16/2022 12:19 PM    Mr. Lesley Kidd. [facility] [address] [phone]      To Whom It May Concern:    Lesley Kidd. is currently under the care of [facility]. He will return to work/school on: 9/26/22    If there are questions or concerns please have the patient contact our office.         Sincerely,      Sneha Reeder PA-C

## 2022-09-16 NOTE — LETTER
NOTIFICATION RETURN TO WORK / SCHOOL    9/16/2022 12:30 PM    Mr. Chioma Cordon. [facility] [address] [phone]      To Whom It May Concern:    Chioma Don is currently under the care of Avelina Bateman. He will return to work/school on: 9/24/22    If there are questions or concerns please have the patient contact our office.         Sincerely,      Nabil Salinas PA-C

## 2022-09-19 NOTE — PROGRESS NOTES
Nicole White (: 1960) is a 63 y.o. male patient, here for evaluation of the following chief complaint(s):  Surgical Follow-up (Right knee follow up/)       ASSESSMENT/PLAN:  Below is the assessment and plan developed based on review of pertinent history, physical exam, labs, studies, and medications. Radiographs reviewed including 3 views of the right knee. Status post partial right knee arthroplasty. No evidence of aseptic loosening. Overall alignment is appropriate. Patella tracking centrally. Assessment and plan: Status post partial right knee arthroplasty 22. The patient is very happy with  progress and is doing well. Still struggles with pain at night. Prescription given of diclofenac to help with discomfort. Prescription also given for outpatient physical therapy. Plans to follow-up in 6 weeks for standard postop check or sooner as needed. 1. Status post right unicompartmental knee replacement  -     XR KNEE RT 3 V; Future  -     REFERRAL TO PHYSICAL THERAPY  -     diclofenac EC (VOLTAREN) 75 mg EC tablet; Take 1 Tablet by mouth two (2) times daily (with meals). , Normal, Disp-60 Tablet, R-0      Encounter Diagnosis   Name Primary? Status post right unicompartmental knee replacement Yes        No follow-ups on file. SUBJECTIVE/OBJECTIVE:  Nicole White (: 1960) is a 63 y.o. male who presents today for the following:  Chief Complaint   Patient presents with    Surgical Follow-up     Right knee follow up         51-year-old male comes in today for follow-up. Status post right unicompartmental knee replacement. Postoperatively doing well. Has good range of motion. Incision healing well. IMAGING:  XR Results (most recent):  Results from Appointment encounter on 22    XR KNEE RT 3 V    Narrative  Radiographs reviewed including 3 views of the right knee. Status post partial right knee arthroplasty. No evidence of aseptic loosening. Overall alignment is appropriate. Patella tracking centrally. Allergies   Allergen Reactions    Bee Sting [Sting, Bee] Anaphylaxis    Amlodipine Other (comments)     Increased GERD    Demerol [Meperidine] Unknown (comments)     Doubles over in pain    Lisinopril Cough and Other (comments)     Fatigue         Current Outpatient Medications   Medication Sig    diclofenac EC (VOLTAREN) 75 mg EC tablet Take 1 Tablet by mouth two (2) times daily (with meals). atorvastatin (LIPITOR) 20 mg tablet Take 1 tablet by mouth once daily    aspirin 81 mg chewable tablet Take 1 Tablet by mouth two (2) times a day. famotidine (PEPCID) 20 mg tablet Take 1 Tablet by mouth two (2) times a day. meloxicam (MOBIC) 7.5 mg tablet Take 1 Tablet by mouth daily. omeprazole (PRILOSEC) 20 mg capsule Take 1 Capsule by mouth in the morning. zolpidem CR (AMBIEN CR) 12.5 mg tablet Take for sleep as needed nightly    naproxen (NAPROSYN) 500 mg tablet Take 1 Tablet by mouth daily as needed for Pain.    valsartan-hydroCHLOROthiazide (DIOVAN-HCT) 160-12.5 mg per tablet Take 1 Tablet by mouth daily. DC valsartan,HCTZ    cholecalciferol, vitamin D3, 50 mcg (2,000 unit) tab Take  by mouth. selenium 200 mcg Tab Take 400 mcg by mouth daily. ascorbic acid, vitamin C, (VITAMIN C) 500 mg tablet Take  by mouth. No current facility-administered medications for this visit.        Past Medical History:   Diagnosis Date    CAD (coronary artery disease) 2016    mild; calcium scoring score=46    GERD (gastroesophageal reflux disease)     Hypertension     Mixed hyperlipidemia     LDL goal <70    Prediabetes 09/2016    Rectal polyp 11/10/14    Rotator cuff tear, left     Dr. Amilcar Sapp    Sleep disturbance         Past Surgical History:   Procedure Laterality Date    HX COLONOSCOPY  11/10/14    +rectal polyp (hyperplastic), Dr. Venita Alves KNEE ARTHROSCOPY Right 2006    HX MENISCUS REPAIR Right 06/21/2016    Dr. Patricio Sandoval PROCEDURES Right 2008    HX ROTATOR CUFF REPAIR  09/2016    Dr. Luda Calderón       Family History   Problem Relation Age of Onset    Heart Disease Father         CAD, age 72    Diabetes Father     Cancer Father         lung--smoker    Cancer Paternal Uncle         lung - smoker        Social History     Tobacco Use    Smoking status: Never    Smokeless tobacco: Never   Substance Use Topics    Alcohol use: Yes     Alcohol/week: 3.0 - 5.0 standard drinks     Types: 3 - 5 Standard drinks or equivalent per week     Comment: social        All systems reviewed x 12 and were negative with the exception of None      No flowsheet data found. Vitals:  Ht 5' 10\" (1.778 m)   Wt 221 lb (100.2 kg)   BMI 31.71 kg/m²    Body mass index is 31.71 kg/m². Physical Exam    Gen: NAD    Resp: Non-labored    RLE: Midline incision is healing well with no evidence of infection. No erythema. Range of motion 0-110°. No extensor lag. Grossly stable in the coronal and sagittal planes. No calf tenderness. No evidence of a DVT. Motor grossly intact with 5 out of 5 strength. Sensation intact to light touch throughout. Palpable pedal pulses. Anurag Simeon M.D. was available for immediate consultation as the supervising physician. An electronic signature was used to authenticate this note.   -- Shanthi Mobley PA-C

## 2022-09-27 DIAGNOSIS — F51.01 PRIMARY INSOMNIA: ICD-10-CM

## 2022-09-30 RX ORDER — ZOLPIDEM TARTRATE 12.5 MG/1
TABLET, FILM COATED, EXTENDED RELEASE ORAL
Qty: 15 TABLET | Refills: 0 | Status: SHIPPED | OUTPATIENT
Start: 2022-09-30 | End: 2022-10-21

## 2022-10-04 ENCOUNTER — OFFICE VISIT (OUTPATIENT)
Dept: ORTHOPEDIC SURGERY | Age: 62
End: 2022-10-04
Payer: COMMERCIAL

## 2022-10-04 DIAGNOSIS — M25.561 POSTOPERATIVE PAIN OF RIGHT KNEE: Primary | ICD-10-CM

## 2022-10-04 DIAGNOSIS — R26.2 DIFFICULTY WALKING: ICD-10-CM

## 2022-10-04 DIAGNOSIS — G89.18 POSTOPERATIVE PAIN OF RIGHT KNEE: Primary | ICD-10-CM

## 2022-10-04 DIAGNOSIS — Z96.651 S/P RIGHT UNICOMPARTMENTAL KNEE REPLACEMENT: ICD-10-CM

## 2022-10-04 PROCEDURE — 97162 PT EVAL MOD COMPLEX 30 MIN: CPT | Performed by: PHYSICAL THERAPIST

## 2022-10-04 PROCEDURE — 97110 THERAPEUTIC EXERCISES: CPT | Performed by: PHYSICAL THERAPIST

## 2022-10-04 NOTE — PROGRESS NOTES
Patient Name: Lisa Woody. Date:10/4/2022  : 1960  [x]  Patient  Verified  Payor: BLUE ADI / Plan: Diamond Roberson [id number] PPO / Product Type: PPO /    Total Treatment Time (min): 40  1:1 Treatment Time ([address] only):    Yenifer Mead PA-C  1. Postoperative pain of right knee  2. Difficulty walking  3. S/P right unicompartmental knee replacement      Subjective:    Patient is a 63 y.o. male referred to physical therapy by Dr. Wing Verduzco. Pt. Is s/p right unicompartmental knee replacement on 22. Pt. Recently discharged from home health physical therapy. Pt. C/o intermittent knee pain and swelling and is anxious to return to premorbid activities such as hiking and biking. Pt. Arrives to clinic ambulating w/slight antalgia. Objective: Incisions: well healed midline knee incision with scabbing at proximal incision. Gait: slight antalgia FWB  Strength: quads/hamstrings/hip flexor:4/5   Left knee ROM:   Right knee ROM: -5-130 degrees   Circumfererence: increased by approximately +4.5 cm as compared to contralateral side at MP, 3\" above MP:+1cm and 3\" below MP:+3cm    Ex: 20 min  Treatment today to include instruction in a home exercise program as well as providing patient with written and visual handouts. PT Exercise Log         EXERCISE 10/4/2022   Seated hs stretch 5x30\"   Standing TKE w/towel 2x10   Step downs 6\" 2x10   Prone hang 5'   SLB on airex 5x20\"                                                             Man: 0 min    NMR: 0 min    All questions were addressed. Assessment:    Patient presents with increased pain and decreased ROM, strength, and mobility consistent with being s/p unicompartmental knee replacement. Long Term Goals. 6 weeks  1. Patient will demonstrate the ability to ambulate on level surfaces, uneven surfaces, stairs with a smooth and nonantalgic gait pattern.   2.  Patient will demonstrate improved AROM of the knee to within 95% or greater as compared to the contralateral side to assist with home/work/community/recreational ADL activity. 3.  Patient will report pain to be consistently less than or equal to 1/10 with all home/work/community/recreational ADL activity. Short Term Goals. 2 visits. Patient will demonstrate independence with HEP. Plan:  Plan of care: Physical therapy consisted of frequency of 2/week for the next 6 weeks. Physical therapy will consist of therapeutic exercise, modalities, patient education, neuromuscular reeducation, manual therapy, therapeutic activity, dry needling, and instruction in home exercise program as appropriate. Eval  Ex: 20 min  Man: 0  NMR: 0    The referring physician has reviewed and approved this evaluation and plan of care as noted by the electronic signature attached to note.     Rosio Jon MSPT, DPT co-treat with Elda Roque DPT, ATC

## 2022-10-07 ENCOUNTER — OFFICE VISIT (OUTPATIENT)
Dept: ORTHOPEDIC SURGERY | Age: 62
End: 2022-10-07
Payer: COMMERCIAL

## 2022-10-07 DIAGNOSIS — G89.18 POSTOPERATIVE PAIN OF RIGHT KNEE: Primary | ICD-10-CM

## 2022-10-07 DIAGNOSIS — R26.2 DIFFICULTY WALKING: ICD-10-CM

## 2022-10-07 DIAGNOSIS — Z96.651 S/P RIGHT UNICOMPARTMENTAL KNEE REPLACEMENT: ICD-10-CM

## 2022-10-07 DIAGNOSIS — M25.561 POSTOPERATIVE PAIN OF RIGHT KNEE: Primary | ICD-10-CM

## 2022-10-07 PROCEDURE — 97112 NEUROMUSCULAR REEDUCATION: CPT

## 2022-10-07 PROCEDURE — 97110 THERAPEUTIC EXERCISES: CPT

## 2022-10-07 PROCEDURE — 97140 MANUAL THERAPY 1/> REGIONS: CPT

## 2022-10-07 NOTE — PROGRESS NOTES
I have reviewed the notes, assessments, and/or procedures performed by Esme Chow PTA, I concur with her/his documentation of Rubia Goddard. Judith De Los Santos

## 2022-10-07 NOTE — PROGRESS NOTES
PT DAILY TREATMENT NOTE    Patient Name: Coretta Rodriguez. Date:10/7/2022  : 1960  [x]  Patient  Verified  Payor: BLUE CROSS / Plan: Diamond Roberson [id number] PPO / Product Type: PPO /    Total Treatment Time (min): 60  Referring Provider: Skip Cueto MD    1. Postoperative pain of right knee  2. Difficulty walking  3. S/P right unicompartmental knee replacement      SUBJECTIVE  Subjective functional status/changes:   [] No changes reported    Patient reports he has not had time to do his HEP as he has been working a lot. He states compression sleeve does seem to help a little with swelling. OBJECTIVE/TREATMENT    Manual Therapy x 20 mins:   PF/TF mobilizations to affected limb to promote improved joint mobility. PROM for knee flexion and extension mobility. STM/MFR to the distal IT band, quadriceps, peripatellar structures and popliteal musculature. Passive quad and hamstring stretching in supine and with leg off table. Neuromuscular Re-education (NMR) x 10 minutes:  [x]  Kinesiotaping applied in a peripatellar \"Y\" pattern to promote lymphatic drainage. []  Neuromuscular reeducation to the VMO with use of [place] electrical stimulation in conjunction with active contraction and exercises. [x]  balance/proprioceptive exercises and activities in clinic as listed below as NMR. Therapeutic Exercise x 30 mins:   Strengthening/Endurance/ADL function/Neuromuscular reeducation activities/exercises supervised and completed as listed below.       EXERCISE X= completed on  10/7/2022   slantboard x   SAQ/LAQ    TKE grn   Balance board *NMR x   Prone hang 3'   TG press Lv 16   SLS on foam *NMR x                                                       Added/Changed Exercises:  []  Advanced to address: [] functional strength/ROM deficits [] balance/proprioceptive tasks  []  Modified: [] per subjective reports [] for patient time constraints [] for clinic time constraints    Modality:  [] E-Stim: type _ x _ min     []att   []unatt   []w/ice   []w/heat  []  Ultrasound: []cont   []pulse    _ W/cm2 x _  min   []1MHz   []3MHz  [x]  Ice pack: post      []  Hot pack: pre    []  Other:     Patient Education: [] Review HEP    [] Progressed/Changed HEP to include:  [] positioning   [] body mechanics   [] transfers   [] heat/ice application      ASSESSMENT    Extension loss with A/PROM. He does have quite a bit of swelling in the knee and edema in the lower leg. Good flexion ROM at this point.      Progress towards goals / Updated goals:    PLAN  [x]  Upgrade activities as tolerated      [x]  Continue plan of care  []  Discharge due to:  []  Other:      Co-treatment by Edel Denise, TYLER 10/7/2022

## 2022-10-12 ENCOUNTER — OFFICE VISIT (OUTPATIENT)
Dept: ORTHOPEDIC SURGERY | Age: 62
End: 2022-10-12
Payer: COMMERCIAL

## 2022-10-12 DIAGNOSIS — Z96.651 S/P RIGHT UNICOMPARTMENTAL KNEE REPLACEMENT: ICD-10-CM

## 2022-10-12 DIAGNOSIS — R26.2 DIFFICULTY WALKING: ICD-10-CM

## 2022-10-12 DIAGNOSIS — M25.561 POSTOPERATIVE PAIN OF RIGHT KNEE: Primary | ICD-10-CM

## 2022-10-12 DIAGNOSIS — G89.18 POSTOPERATIVE PAIN OF RIGHT KNEE: Primary | ICD-10-CM

## 2022-10-12 PROCEDURE — 97112 NEUROMUSCULAR REEDUCATION: CPT

## 2022-10-12 PROCEDURE — 97140 MANUAL THERAPY 1/> REGIONS: CPT

## 2022-10-12 PROCEDURE — 97110 THERAPEUTIC EXERCISES: CPT

## 2022-10-12 NOTE — PROGRESS NOTES
PT DAILY TREATMENT NOTE    Patient Name: Dionne Holland. Date:10/12/2022  : 1960  [x]  Patient  Verified  Payor: BLUE ADI / Plan: Diamond Roberson [id number] PPO / Product Type: PPO /    Total Treatment Time (min): 65+  Referring Provider: Dwane Cranker, MD    1. Postoperative pain of right knee  2. Difficulty walking  3. S/P right unicompartmental knee replacement      SUBJECTIVE  Subjective functional status/changes:   [] No changes reported    Patient reports he felt \"great\" after his last appointment and seen some improvement in swelling. He states he has had itching in the shin/anterior tib area that he feels is associated with some return of nerve sensation. OBJECTIVE/TREATMENT    Manual Therapy x 20 mins:   PF/TF mobilizations to affected limb to promote improved joint mobility. PROM for knee flexion and extension mobility. STM/MFR to the distal IT band, quadriceps, peripatellar structures and popliteal musculature. Passive quad and hamstring stretching in supine and with leg off table. Neuromuscular Re-education (NMR) x 10 minutes:  [x]  Kinesiotaping applied in a peripatellar \"Y\" pattern to promote lymphatic drainage. []  Neuromuscular reeducation to the VMO with use of [place] electrical stimulation in conjunction with active contraction and exercises. [x]  balance/proprioceptive exercises and activities in clinic as listed below as NMR. Therapeutic Exercise x 30 mins:   Strengthening/Endurance/ADL function/Neuromuscular reeducation activities/exercises supervised and completed as listed below.       EXERCISE X= completed on  10/12/2022   slantboard x   SAQ/LAQ /   TKE [facility]   Balance board *NMR x   Extension prop 5#   TG press Lv 16   SLS on foam *NMR x   Lat steps on foam x                                                   Added/Changed Exercises:  [x]  Advanced to address: [x] functional strength/ROM deficits [x] balance/proprioceptive tasks  []  Modified: [] per subjective reports [] for patient time constraints [] for clinic time constraints    Modality:  []  E-Stim: type _ x _ min     []att   []unatt   []w/ice   []w/heat  []  Ultrasound: []cont   []pulse    _ W/cm2 x _  min   []1MHz   []3MHz  [x]  Ice pack: post      []  Hot pack: pre    []  Other:     Patient Education: [] Review HEP    [] Progressed/Changed HEP to include:  [] positioning   [] body mechanics   [] transfers   [] heat/ice application      ASSESSMENT    Still with swelling and effusion in the knee. He does have some redness in the anterior tib/shin associated with areas of itching. Incision is healing well.  He is monitoring these symptoms and has alerted the MD.      Progress towards goals / Updated goals:    PLAN  [x]  Upgrade activities as tolerated      [x]  Continue plan of care  []  Discharge due to:  []  Other:      Co-treatment by Merna Calles, TYLER 10/12/2022

## 2022-10-12 NOTE — PROGRESS NOTES
I have reviewed the notes, assessments, and/or procedures performed by Ame Gudino PTA, I concur with her/his documentation of Katiana Cervantes. Pao Villanueva

## 2022-10-14 ENCOUNTER — OFFICE VISIT (OUTPATIENT)
Dept: ORTHOPEDIC SURGERY | Age: 62
End: 2022-10-14
Payer: COMMERCIAL

## 2022-10-14 DIAGNOSIS — M25.561 POSTOPERATIVE PAIN OF RIGHT KNEE: Primary | ICD-10-CM

## 2022-10-14 DIAGNOSIS — Z96.651 S/P RIGHT UNICOMPARTMENTAL KNEE REPLACEMENT: ICD-10-CM

## 2022-10-14 DIAGNOSIS — R26.2 DIFFICULTY WALKING: ICD-10-CM

## 2022-10-14 DIAGNOSIS — G89.18 POSTOPERATIVE PAIN OF RIGHT KNEE: Primary | ICD-10-CM

## 2022-10-14 PROCEDURE — 97112 NEUROMUSCULAR REEDUCATION: CPT

## 2022-10-14 PROCEDURE — 97140 MANUAL THERAPY 1/> REGIONS: CPT

## 2022-10-14 PROCEDURE — 97110 THERAPEUTIC EXERCISES: CPT

## 2022-10-14 NOTE — PROGRESS NOTES
I have reviewed the notes, assessments, and/or procedures performed by Argensi Arriaza PTA, I concur with her/his documentation of Rhae Lamp. Corina Amin

## 2022-10-14 NOTE — PROGRESS NOTES
PT DAILY TREATMENT NOTE    Patient Name: Lynette Alberts. Date:10/14/2022  : 1960  [x]  Patient  Verified  Payor: BLUE CROSS / Plan: Diamond [employer]erson [id number] PPO / Product Type: PPO /    Total Treatment Time (min): 65+  Referring Provider: Flex Dean MD    1. Postoperative pain of right knee  2. Difficulty walking  3. S/P right unicompartmental knee replacement    SUBJECTIVE  Subjective functional status/changes:   [] No changes reported    Patient reports knee feels less swollen/tight when flexing. He states itching/redness in anterior shin has been a little better with use of Benadryl and hydrocortisone cream. He denies having a fever. OBJECTIVE/TREATMENT    Manual Therapy x 20 mins:   PF/TF mobilizations to affected limb to promote improved joint mobility. PROM for knee flexion and extension mobility. STM/MFR to the distal IT band, quadriceps, peripatellar structures and popliteal musculature. Passive quad and hamstring stretching in supine and with leg off table. Neuromuscular Re-education (NMR) x 10 minutes:  [x]  Kinesiotaping applied in a peripatellar \"Y\" pattern to promote lymphatic drainage. []  Neuromuscular reeducation to the VMO with use of [place] electrical stimulation in conjunction with active contraction and exercises. [x]  balance/proprioceptive exercises and activities in clinic as listed below as NMR. Therapeutic Exercise x 30 mins:   Strengthening/Endurance/ADL function/Neuromuscular reeducation activities/exercises supervised and completed as listed below.       EXERCISE X= completed on  10/14/2022   slantboard x   SAQ/LAQ /   [facility]   Balance board *NMR x   Extension prop 7#   TG press Lv 16   SLS on foam *NMR x   Lat steps on foam x                                                   Added/Changed Exercises:  [x]  Advanced to address: [x] functional strength/ROM deficits [x] balance/proprioceptive tasks  []  Modified: [] per subjective reports [] for patient time constraints [] for clinic time constraints    Modality:  []  E-Stim: type _ x _ min     []att   []unatt   []w/ice   []w/heat  []  Ultrasound: []cont   []pulse    _ W/cm2 x _  min   []1MHz   []3MHz  [x]  Ice pack: post      []  Hot pack: pre    []  Other:     Patient Education: [] Review HEP    [] Progressed/Changed HEP to include:  [] positioning   [] body mechanics   [] transfers   [] heat/ice application      ASSESSMENT    Redness and dermal inflammation do seem to be superficial in nature as he has no other signs of infection. Swelling is decreasing, ROM is improving and he has better tolerance to ADLs and work tasks.       Progress towards goals / Updated goals:    PLAN  [x]  Upgrade activities as tolerated      [x]  Continue plan of care  []  Discharge due to:  []  Other:      Co-treatment by Burnetta Kayser, PTA 10/14/2022

## 2022-10-17 ENCOUNTER — OFFICE VISIT (OUTPATIENT)
Dept: URGENT CARE | Age: 62
End: 2022-10-17
Payer: COMMERCIAL

## 2022-10-17 VITALS
HEART RATE: 86 BPM | TEMPERATURE: 98.2 F | RESPIRATION RATE: 16 BRPM | OXYGEN SATURATION: 97 % | BODY MASS INDEX: 31.71 KG/M2 | SYSTOLIC BLOOD PRESSURE: 177 MMHG | DIASTOLIC BLOOD PRESSURE: 97 MMHG | WEIGHT: 221 LBS

## 2022-10-17 DIAGNOSIS — L03.115 CELLULITIS OF RIGHT LOWER LEG: Primary | ICD-10-CM

## 2022-10-17 DIAGNOSIS — L73.9 FOLLICULITIS: ICD-10-CM

## 2022-10-17 DIAGNOSIS — L29.9 ITCHING: ICD-10-CM

## 2022-10-17 DIAGNOSIS — Z96.651 STATUS POST RIGHT KNEE REPLACEMENT: ICD-10-CM

## 2022-10-17 DIAGNOSIS — I10 ESSENTIAL HYPERTENSION: ICD-10-CM

## 2022-10-17 PROCEDURE — 99213 OFFICE O/P EST LOW 20 MIN: CPT | Performed by: NURSE PRACTITIONER

## 2022-10-17 RX ORDER — PREDNISONE 10 MG/1
10 TABLET ORAL SEE ADMIN INSTRUCTIONS
Qty: 21 TABLET | Refills: 0 | Status: SHIPPED | OUTPATIENT
Start: 2022-10-17 | End: 2022-10-21

## 2022-10-17 RX ORDER — CEPHALEXIN 500 MG/1
500 CAPSULE ORAL 4 TIMES DAILY
Qty: 40 CAPSULE | Refills: 0 | Status: SHIPPED | OUTPATIENT
Start: 2022-10-17 | End: 2022-10-27

## 2022-10-17 NOTE — PROGRESS NOTES
Rash   The history is provided by the Patient. This is a new problem. The current episode started more than 1 week ago. The problem has been gradually worsening. The problem is associated with nothing. There has been no fever. The rash is present on the left ankle, right lower leg, right ankle and right foot. The patient is experiencing no pain. Associated symptoms include itching. He has tried oral antihistamines for the symptoms. The treatment provided mild relief. S/p R TKR 2 months ago, still in PT. Denies knee pain. Denies any fevers, chills, N/V. Past Medical History:   Diagnosis Date    CAD (coronary artery disease) 2016    mild; calcium scoring score=46    GERD (gastroesophageal reflux disease)     Hypertension     Mixed hyperlipidemia     LDL goal <70    Prediabetes 09/2016    Rectal polyp 11/10/14    Rotator cuff tear, left     Dr. Ines Stanton    Sleep disturbance         Past Surgical History:   Procedure Laterality Date    HX COLONOSCOPY  11/10/14    +rectal polyp (hyperplastic), Dr. Urmila Yousif KNEE ARTHROSCOPY Right 2006    HX MENISCUS REPAIR Right 06/21/2016    Dr. Denzel Hutchins MOHS PROCEDURES Right 2008    HX ROTATOR CUFF REPAIR  09/2016    Dr. Ines Stanton         Family History   Problem Relation Age of Onset    Heart Disease Father         CAD, age 72    Diabetes Father     Cancer Father         lung--smoker    Cancer Paternal Uncle         lung - smoker        Social History     Socioeconomic History    Marital status: SINGLE     Spouse name: single    Number of children: 2    Years of education: Not on file    Highest education level: Not on file   Occupational History    Occupation: [employer]--hourly    Tobacco Use    Smoking status: Never    Smokeless tobacco: Never   Vaping Use    Vaping Use: Never used   Substance and Sexual Activity    Alcohol use:  Yes     Alcohol/week: 3.0 - 5.0 standard drinks     Types: 3 - 5 Standard drinks or equivalent per week     Comment: social Drug use: No    Sexual activity: Yes     Partners: Female     Comment: single,2 daughters,working in [employer]   Other Topics Concern    Not on file   Social History Narrative    Not on file     Social Determinants of Health     Financial Resource Strain: Not on file   Food Insecurity: Not on file   Transportation Needs: Not on file   Physical Activity: Not on file   Stress: Not on file   Social Connections: Not on file   Intimate Partner Violence: Not on file   Housing Stability: Not on file                ALLERGIES: Bee sting [sting, bee]; Amlodipine; Demerol [meperidine]; and Lisinopril    Review of Systems   Constitutional:  Negative for chills and fever. Gastrointestinal:  Negative for nausea and vomiting. Skin:  Positive for itching and rash. All other systems reviewed and are negative. Vitals:    10/17/22 1615 10/17/22 1617 10/17/22 1619 10/17/22 1750   BP:   (!) 161/101 (!) 177/97   Pulse:  86     Resp:  16     Temp:  98.2 °F (36.8 °C)     SpO2:  97%     Weight: 221 lb (100.2 kg)          Physical Exam  Constitutional:       General: He is not in acute distress. Appearance: Normal appearance. He is not ill-appearing or toxic-appearing. HENT:      Head: Normocephalic and atraumatic. Skin:            Comments: Several inflamed hair follicles to right lower leg, right ankle, right foot, left ankle, left foot. Single inflamed hair follicle to right upper arm. Anterior right lower leg with erythema, swelling, mild tenderness - approx 10cm x 20cm. See attached images. Neurological:      Mental Status: He is alert. ICD-10-CM ICD-9-CM   1. Cellulitis of right lower leg  L03.115 682.6   2. Folliculitis  S77.2 257.8   3. Itching  L29.9 698.9   4. Status post right knee replacement  Z96.651 V43.65   5. Essential hypertension  I10 401.9   -elevated today. Normally 150's. Continue to monitor.     Orders Placed This Encounter    cephALEXin (KEFLEX) 500 mg capsule     Sig: Take 1 Capsule by mouth four (4) times daily for 10 days. Dispense:  40 Capsule     Refill:  0    predniSONE (STERAPRED DS) 10 mg dose pack     Sig: Take 1 Tablet by mouth See Admin Instructions. See administration instruction per 10mg dose pack     Dispense:  21 Tablet     Refill:  0      He has called orthopedic to inform of this rash. Advised to monitor rash closely. Watch for streaking, fevers/chills, N/V, joint pain/swelling. The patient is to follow up with PCP. If signs and symptoms become worse the pt is to go to the ER.      Xochitl Lloyd NP       MDM    Procedures

## 2022-10-19 ENCOUNTER — HOSPITAL ENCOUNTER (EMERGENCY)
Age: 62
Discharge: HOME | End: 2022-10-19
Attending: EMERGENCY MEDICINE
Payer: COMMERCIAL

## 2022-10-19 VITALS
RESPIRATION RATE: 19 BRPM | SYSTOLIC BLOOD PRESSURE: 166 MMHG | DIASTOLIC BLOOD PRESSURE: 95 MMHG | OXYGEN SATURATION: 95 % | HEIGHT: 70 IN | HEART RATE: 88 BPM | TEMPERATURE: 98.4 F | WEIGHT: 230.16 LBS | BODY MASS INDEX: 32.95 KG/M2

## 2022-10-19 DIAGNOSIS — I10 ESSENTIAL HYPERTENSION: ICD-10-CM

## 2022-10-19 DIAGNOSIS — L28.2 PRURITIC RASH: Primary | ICD-10-CM

## 2022-10-19 LAB
ALBUMIN SERPL-MCNC: 4.2 G/DL (ref 3.5–5)
ALBUMIN/GLOB SERPL: 1.1 {RATIO} (ref 1.1–2.2)
ALP SERPL-CCNC: 70 U/L (ref 45–117)
ALT SERPL-CCNC: 53 U/L (ref 12–78)
ANION GAP SERPL CALC-SCNC: 8 MMOL/L (ref 5–15)
AST SERPL-CCNC: 25 U/L (ref 15–37)
ATRIAL RATE: 98 BPM
BASOPHILS # BLD: 0 K/UL (ref 0–0.1)
BASOPHILS NFR BLD: 0 % (ref 0–1)
BILIRUB SERPL-MCNC: 0.4 MG/DL (ref 0.2–1)
BUN SERPL-MCNC: 19 MG/DL (ref 6–20)
BUN/CREAT SERPL: 18 (ref 12–20)
CALCIUM SERPL-MCNC: 9.7 MG/DL (ref 8.5–10.1)
CALCULATED P AXIS, ECG09: 37 DEGREES
CALCULATED R AXIS, ECG10: 13 DEGREES
CALCULATED T AXIS, ECG11: 56 DEGREES
CHLORIDE SERPL-SCNC: 104 MMOL/L (ref 97–108)
CO2 SERPL-SCNC: 25 MMOL/L (ref 21–32)
CREAT SERPL-MCNC: 1.04 MG/DL (ref 0.7–1.3)
CRP SERPL-MCNC: <0.29 MG/DL (ref 0–0.6)
DIAGNOSIS, 93000: NORMAL
DIFFERENTIAL METHOD BLD: ABNORMAL
EOSINOPHIL # BLD: 0 K/UL (ref 0–0.4)
EOSINOPHIL NFR BLD: 0 % (ref 0–7)
ERYTHROCYTE [DISTWIDTH] IN BLOOD BY AUTOMATED COUNT: 13.2 % (ref 11.5–14.5)
ERYTHROCYTE [SEDIMENTATION RATE] IN BLOOD: 6 MM/HR (ref 0–20)
GLOBULIN SER CALC-MCNC: 3.7 G/DL (ref 2–4)
GLUCOSE SERPL-MCNC: 171 MG/DL (ref 65–100)
HCT VFR BLD AUTO: 41 % (ref 36.6–50.3)
HGB BLD-MCNC: 14.3 G/DL (ref 12.1–17)
IMM GRANULOCYTES # BLD AUTO: 0.1 K/UL (ref 0–0.04)
IMM GRANULOCYTES NFR BLD AUTO: 1 % (ref 0–0.5)
LYMPHOCYTES # BLD: 1.1 K/UL (ref 0.8–3.5)
LYMPHOCYTES NFR BLD: 10 % (ref 12–49)
MCH RBC QN AUTO: 32.3 PG (ref 26–34)
MCHC RBC AUTO-ENTMCNC: 34.9 G/DL (ref 30–36.5)
MCV RBC AUTO: 92.6 FL (ref 80–99)
MONOCYTES # BLD: 0.6 K/UL (ref 0–1)
MONOCYTES NFR BLD: 6 % (ref 5–13)
NEUTS SEG # BLD: 8.8 K/UL (ref 1.8–8)
NEUTS SEG NFR BLD: 83 % (ref 32–75)
NRBC # BLD: 0 K/UL (ref 0–0.01)
NRBC BLD-RTO: 0 PER 100 WBC
P-R INTERVAL, ECG05: 166 MS
PLATELET # BLD AUTO: 194 K/UL (ref 150–400)
PMV BLD AUTO: 9.8 FL (ref 8.9–12.9)
POTASSIUM SERPL-SCNC: 3.7 MMOL/L (ref 3.5–5.1)
PROT SERPL-MCNC: 7.9 G/DL (ref 6.4–8.2)
Q-T INTERVAL, ECG07: 360 MS
QRS DURATION, ECG06: 86 MS
QTC CALCULATION (BEZET), ECG08: 459 MS
RBC # BLD AUTO: 4.43 M/UL (ref 4.1–5.7)
SODIUM SERPL-SCNC: 137 MMOL/L (ref 136–145)
TROPONIN-HIGH SENSITIVITY: 101 NG/L (ref 0–76)
TROPONIN-HIGH SENSITIVITY: 98 NG/L (ref 0–76)
VENTRICULAR RATE, ECG03: 98 BPM
WBC # BLD AUTO: 10.6 K/UL (ref 4.1–11.1)

## 2022-10-19 PROCEDURE — 85025 COMPLETE CBC W/AUTO DIFF WBC: CPT

## 2022-10-19 PROCEDURE — 85652 RBC SED RATE AUTOMATED: CPT

## 2022-10-19 PROCEDURE — 80053 COMPREHEN METABOLIC PANEL: CPT

## 2022-10-19 PROCEDURE — 84484 ASSAY OF TROPONIN QUANT: CPT

## 2022-10-19 PROCEDURE — 74011250636 HC RX REV CODE- 250/636: Performed by: EMERGENCY MEDICINE

## 2022-10-19 PROCEDURE — 74011250637 HC RX REV CODE- 250/637: Performed by: EMERGENCY MEDICINE

## 2022-10-19 PROCEDURE — 96375 TX/PRO/DX INJ NEW DRUG ADDON: CPT

## 2022-10-19 PROCEDURE — 96374 THER/PROPH/DIAG INJ IV PUSH: CPT

## 2022-10-19 PROCEDURE — 36415 COLL VENOUS BLD VENIPUNCTURE: CPT

## 2022-10-19 PROCEDURE — 93005 ELECTROCARDIOGRAM TRACING: CPT

## 2022-10-19 PROCEDURE — 86140 C-REACTIVE PROTEIN: CPT

## 2022-10-19 PROCEDURE — 99284 EMERGENCY DEPT VISIT MOD MDM: CPT

## 2022-10-19 RX ORDER — DEXAMETHASONE SODIUM PHOSPHATE 4 MG/ML
10 INJECTION, SOLUTION INTRA-ARTICULAR; INTRALESIONAL; INTRAMUSCULAR; INTRAVENOUS; SOFT TISSUE
Status: COMPLETED | OUTPATIENT
Start: 2022-10-19 | End: 2022-10-19

## 2022-10-19 RX ORDER — DIPHENHYDRAMINE HYDROCHLORIDE 50 MG/ML
25 INJECTION, SOLUTION INTRAMUSCULAR; INTRAVENOUS
Status: COMPLETED | OUTPATIENT
Start: 2022-10-19 | End: 2022-10-19

## 2022-10-19 RX ORDER — FAMOTIDINE 10 MG/ML
20 INJECTION INTRAVENOUS
Status: COMPLETED | OUTPATIENT
Start: 2022-10-19 | End: 2022-10-19

## 2022-10-19 RX ORDER — HYDROXYZINE 50 MG/1
50 TABLET, FILM COATED ORAL
Qty: 20 TABLET | Refills: 0 | Status: SHIPPED | OUTPATIENT
Start: 2022-10-19 | End: 2022-10-21 | Stop reason: SDUPTHER

## 2022-10-19 RX ORDER — HYDROXYZINE 25 MG/1
50 TABLET, FILM COATED ORAL
Status: COMPLETED | OUTPATIENT
Start: 2022-10-19 | End: 2022-10-19

## 2022-10-19 RX ADMIN — DIPHENHYDRAMINE HYDROCHLORIDE 25 MG: 50 INJECTION, SOLUTION INTRAMUSCULAR; INTRAVENOUS at 04:38

## 2022-10-19 RX ADMIN — FAMOTIDINE 20 MG: 10 INJECTION, SOLUTION INTRAVENOUS at 03:17

## 2022-10-19 RX ADMIN — DEXAMETHASONE SODIUM PHOSPHATE 10 MG: 4 INJECTION, SOLUTION INTRAMUSCULAR; INTRAVENOUS at 03:21

## 2022-10-19 RX ADMIN — HYDROXYZINE HYDROCHLORIDE 50 MG: 25 TABLET, FILM COATED ORAL at 03:18

## 2022-10-19 NOTE — ED NOTES
Pt took prednisose at 0480 [id number] 75 on 10/19/2022. Pt also states he drank [employer] and is wondering if he is allergic to it.

## 2022-10-19 NOTE — DISCHARGE INSTRUCTIONS
It was a pleasure taking care of you in our Emergency Department today. We know that when you come to [facility], you are entrusting us with your health, comfort, and safety. Our physicians and nurses honor that trust, and truly appreciate the opportunity to care for you and your loved ones. We also value your feedback. If you receive a survey about your Emergency Department experience today, please fill it out. We care about our patients' feedback, and we listen to what you have to say. Thank you!       Dr. Milan Zabala MD.

## 2022-10-19 NOTE — Clinical Note
[address]  \[facility]\"" EMERGENCY DEPT  [address]  [place] [address]  [phone]    Work/School Note    Date: 10/19/2022    To Whom It May concern:      Smiley Harry was seen and treated today in the emergency room by the following provider(s):  Attending Provider: Jeanette Lacy MD.      Smiley Harry is excused from work/school on 10/19/22. He is clear to return to work/school on 10/20/22.         Sincerely,          Domingo Martin MD

## 2022-10-19 NOTE — ED PROVIDER NOTES
EMERGENCY DEPARTMENT HISTORY AND PHYSICAL EXAM     ----------------------------------------------------------------------------  Please note that this dictation was completed with [employer], the computer voice recognition software. Quite often unanticipated grammatical, syntax, homophones, and other interpretive errors are inadvertently transcribed by the computer software. Please disregard these errors. Please excuse any errors that have escaped final proofreading  ----------------------------------------------------------------------------      Date: 10/19/2022  Patient Name: Smiley Harry History of Presenting Illness     Chief Complaint   Patient presents with    Rash     Pt arrives ambulatory to triage for rash that began yesterday on right lower leg/foot. Was seen at outpatient facility and given steroids and abx. Pt had knee surgery x2 months ago. Pt states \"itchy spots all over body now\"     Hypertension     Pt states his blood pressure was high yesterday and has not missed any of his meds. Pt HTN in triage. History Provided By:  Patient    HPI: Smiley Harry is a 63 y.o. male, with significant pmhx of HTN, GERD, XOL, CAD, who presents via private vehicle to the ED with c/o elevated blood pressure readings at home as well as a diffuse pruritic rash to bilateral lower extremities and right elbow. Notes that the rash wax and wanes but is extremely itchy and not improved with Benadryl or previously prescribed steroids. Patient reports being compliant with his blood pressure medications but noted to have elevated readings at home prompting concern. Patient also specifically denies any associated fevers, chills, CP, SOB, nausea, vomiting, diarrhea, abd pain, changes in BM, urinary sxs, or headache. Patient reports having been seen at urgent care recently and started on antibiotics and steroids for this rash.     Social Hx: denies tobacco  denies EtOH , denies recreational/Illicit Drugs    There are no other complaints, changes, or physical findings at this time. PCP: Shea Benoit MD    Allergies   Allergen Reactions    Bee Sting [Sting, Bee] Anaphylaxis    Amlodipine Other (comments)     Increased GERD    Demerol [Meperidine] Unknown (comments)     Doubles over in pain    Lisinopril Cough and Other (comments)     Fatigue         Current Outpatient Medications   Medication Sig Dispense Refill    hydrOXYzine HCL (ATARAX) 50 mg tablet Take 1 Tablet by mouth every six (6) hours as needed for Itching for up to 10 days. 20 Tablet 0    cephALEXin (KEFLEX) 500 mg capsule Take 1 Capsule by mouth four (4) times daily for 10 days. 40 Capsule 0    predniSONE (STERAPRED DS) 10 mg dose pack Take 1 Tablet by mouth See Admin Instructions. See administration instruction per 10mg dose pack 21 Tablet 0    zolpidem CR (AMBIEN CR) 12.5 mg tablet TAKE  FOR SLEEP AS NEEDED NIGHTLY (Patient not taking: Reported on 10/17/2022) 15 Tablet 0    diclofenac EC (VOLTAREN) 75 mg EC tablet Take 1 Tablet by mouth two (2) times daily (with meals). 60 Tablet 0    atorvastatin (LIPITOR) 20 mg tablet Take 1 tablet by mouth once daily 90 Tablet 0    aspirin 81 mg chewable tablet Take 1 Tablet by mouth two (2) times a day. (Patient not taking: Reported on 10/17/2022) 60 Tablet 0    famotidine (PEPCID) 20 mg tablet Take 1 Tablet by mouth two (2) times a day. (Patient not taking: Reported on 10/17/2022) 60 Tablet 0    meloxicam (MOBIC) 7.5 mg tablet Take 1 Tablet by mouth daily. 30 Tablet 1    omeprazole (PRILOSEC) 20 mg capsule Take 1 Capsule by mouth in the morning. (Patient not taking: Reported on 10/17/2022) 90 Capsule 0    naproxen (NAPROSYN) 500 mg tablet Take 1 Tablet by mouth daily as needed for Pain. (Patient not taking: Reported on 10/17/2022) 30 Tablet 0    valsartan-hydroCHLOROthiazide (DIOVAN-HCT) 160-12.5 mg per tablet Take 1 Tablet by mouth daily.  DC valsartan,HCTZ 90 Tablet 1    cholecalciferol, vitamin D3, 50 mcg (2,000 unit) tab Take  by mouth. (Patient not taking: Reported on 10/17/2022)      selenium 200 mcg Tab Take 400 mcg by mouth daily. (Patient not taking: Reported on 10/17/2022)      ascorbic acid, vitamin C, (VITAMIN C) 500 mg tablet Take  by mouth. (Patient not taking: Reported on 10/17/2022)         Past History     Past Medical History:  Past Medical History:   Diagnosis Date    CAD (coronary artery disease) 2016    mild; calcium scoring score=46    GERD (gastroesophageal reflux disease)     Hypertension     Mixed hyperlipidemia     LDL goal <70    Prediabetes 09/2016    Rectal polyp 11/10/14    Rotator cuff tear, left     Dr. Ailene Berman    Sleep disturbance        Past Surgical History:  Past Surgical History:   Procedure Laterality Date    HX COLONOSCOPY  11/10/14    +rectal polyp (hyperplastic), Dr. Schmitt Chesid KNEE ARTHROSCOPY Right 2006    HX MENISCUS REPAIR Right 06/21/2016    Dr. Rick Maria MOHS PROCEDURES Right 2008    HX ROTATOR CUFF REPAIR  09/2016    Dr. Aileen Berman       Family History:  Family History   Problem Relation Age of Onset    Heart Disease Father         CAD, age 72    Diabetes Father     Cancer Father         lung--smoker    Cancer Paternal Uncle         lung - smoker       Social History:  Social History     Tobacco Use    Smoking status: Never    Smokeless tobacco: Never   Vaping Use    Vaping Use: Never used   Substance Use Topics    Alcohol use: Yes     Alcohol/week: 3.0 - 5.0 standard drinks     Types: 3 - 5 Standard drinks or equivalent per week     Comment: social    Drug use: No       Allergies: Allergies   Allergen Reactions    Bee Sting [Sting, Bee] Anaphylaxis    Amlodipine Other (comments)     Increased GERD    Demerol [Meperidine] Unknown (comments)     Doubles over in pain    Lisinopril Cough and Other (comments)     Fatigue           Review of Systems   Review of Systems   Constitutional:  Negative for chills and fever. HENT: Negative. Eyes: Negative. Respiratory:  Negative for cough, chest tightness and shortness of breath. Cardiovascular:  Negative for chest pain and leg swelling. Gastrointestinal:  Negative for abdominal pain, diarrhea, nausea and vomiting. Endocrine: Negative. Genitourinary:  Negative for difficulty urinating and dysuria. Musculoskeletal:  Negative for myalgias. Skin:  Positive for rash. Neurological: Negative. Psychiatric/Behavioral: Negative. All other systems reviewed and are negative. Physical Exam   Physical Exam  Vitals and nursing note reviewed. Constitutional:       General: He is not in acute distress. Appearance: He is well-developed. He is not diaphoretic. HENT:      Head: Normocephalic and atraumatic. Nose: Nose normal.      Mouth/Throat:      Pharynx: No oropharyngeal exudate. Eyes:      Conjunctiva/sclera: Conjunctivae normal.      Pupils: Pupils are equal, round, and reactive to light. Neck:      Vascular: No JVD. Cardiovascular:      Rate and Rhythm: Normal rate and regular rhythm. Heart sounds: Normal heart sounds. No murmur heard. No friction rub. Pulmonary:      Effort: Pulmonary effort is normal. No respiratory distress. Breath sounds: Normal breath sounds. No stridor. No wheezing or rales. Abdominal:      General: Bowel sounds are normal. There is no distension. Palpations: Abdomen is soft. Tenderness: There is no abdominal tenderness. There is no rebound. Musculoskeletal:         General: No tenderness. Normal range of motion. Cervical back: Normal range of motion and neck supple. Skin:     General: Skin is warm and dry. Findings: Rash (puritic raised rash to bilateral lower legs) present. Neurological:      Mental Status: He is alert and oriented to person, place, and time. Cranial Nerves: No cranial nerve deficit.    Psychiatric:         Speech: Speech normal.         Behavior: Behavior normal.         Thought Content: Thought content normal.         Judgment: Judgment normal.                   Diagnostic Study Results     Labs -     Recent Results (from the past 12 hour(s))   EKG, 12 LEAD, INITIAL    Collection Time: 10/19/22  2:22 AM   Result Value Ref Range    Ventricular Rate 98 BPM    Atrial Rate 98 BPM    P-R Interval 166 ms    QRS Duration 86 ms    Q-T Interval 360 ms    QTC Calculation (Bezet) 459 ms    Calculated P Axis 37 degrees    Calculated R Axis 13 degrees    Calculated T Axis 56 degrees    Diagnosis       ** Poor data quality, interpretation may be adversely affected  Normal sinus rhythm  Normal ECG  When compared with ECG of 16-AUG-2022 13:42,  No significant change was found     CBC WITH AUTOMATED DIFF    Collection Time: 10/19/22  2:29 AM   Result Value Ref Range    WBC 10.6 4.1 - 11.1 K/uL    RBC 4.43 4.10 - 5.70 M/uL    HGB 14.3 12.1 - 17.0 g/dL    HCT 41.0 36.6 - 50.3 %    MCV 92.6 80.0 - 99.0 FL    MCH 32.3 26.0 - 34.0 PG    MCHC 34.9 30.0 - 36.5 g/dL    RDW 13.2 11.5 - 14.5 %    PLATELET 885 023 - 377 K/uL    MPV 9.8 8.9 - 12.9 FL    NRBC 0.0 0  WBC    ABSOLUTE NRBC 0.00 0.00 - 0.01 K/uL    NEUTROPHILS 83 (H) 32 - 75 %    LYMPHOCYTES 10 (L) 12 - 49 %    MONOCYTES 6 5 - 13 %    EOSINOPHILS 0 0 - 7 %    BASOPHILS 0 0 - 1 %    IMMATURE GRANULOCYTES 1 (H) 0.0 - 0.5 %    ABS. NEUTROPHILS 8.8 (H) 1.8 - 8.0 K/UL    ABS. LYMPHOCYTES 1.1 0.8 - 3.5 K/UL    ABS. MONOCYTES 0.6 0.0 - 1.0 K/UL    ABS. EOSINOPHILS 0.0 0.0 - 0.4 K/UL    ABS. BASOPHILS 0.0 0.0 - 0.1 K/UL    ABS. IMM.  GRANS. 0.1 (H) 0.00 - 0.04 K/UL    DF AUTOMATED     METABOLIC PANEL, COMPREHENSIVE    Collection Time: 10/19/22  2:29 AM   Result Value Ref Range    Sodium 137 136 - 145 mmol/L    Potassium 3.7 3.5 - 5.1 mmol/L    Chloride 104 97 - 108 mmol/L    CO2 25 21 - 32 mmol/L    Anion gap 8 5 - 15 mmol/L    Glucose 171 (H) 65 - 100 mg/dL    BUN 19 6 - 20 MG/DL    Creatinine 1.04 0.70 - 1.30 MG/DL    BUN/Creatinine ratio 18 12 - 20      eGFR >60 >60 ml/min/1.73m2    Calcium 9.7 8.5 - 10.1 MG/DL    Bilirubin, total 0.4 0.2 - 1.0 MG/DL    ALT (SGPT) 53 12 - 78 U/L    AST (SGOT) 25 15 - 37 U/L    Alk. phosphatase 70 45 - 117 U/L    Protein, total 7.9 6.4 - 8.2 g/dL    Albumin 4.2 3.5 - 5.0 g/dL    Globulin 3.7 2.0 - 4.0 g/dL    A-G Ratio 1.1 1.1 - 2.2     TROPONIN-HIGH SENSITIVITY    Collection Time: 10/19/22  2:29 AM   Result Value Ref Range    Troponin-High Sensitivity 101 (H) 0 - 76 ng/L   TROPONIN-HIGH SENSITIVITY    Collection Time: 10/19/22  3:28 AM   Result Value Ref Range    Troponin-High Sensitivity 98 (H) 0 - 76 ng/L   SED RATE (ESR)    Collection Time: 10/19/22  3:28 AM   Result Value Ref Range    Sed rate, automated 6 0 - 20 mm/hr       Radiologic Studies -   No orders to display     CT Results  (Last 48 hours)      None          CXR Results  (Last 48 hours)      None              Medical Decision Making   I am the first provider for this patient. I reviewed the vital signs, available nursing notes, past medical history, past surgical history, family history and social history. Vital Signs-Reviewed the patient's vital signs.   Patient Vitals for the past 12 hrs:   Temp Pulse Resp BP SpO2   10/19/22 0353 -- 88 19 (!) 166/95 95 %   10/19/22 0338 -- 88 17 (!) 170/93 94 %   10/19/22 0323 -- 85 12 (!) 174/95 97 %   10/19/22 0308 -- 91 12 (!) 158/101 97 %   10/19/22 0253 -- 95 19 (!) 153/89 97 %   10/19/22 0248 -- 96 18 (!) 160/83 95 %   10/19/22 0233 -- 95 13 (!) 174/111 96 %   10/19/22 0232 -- -- -- -- 96 %   10/19/22 0222 98.4 °F (36.9 °C) 95 22 (!) 171/111 96 %   10/19/22 0215 97.5 °F (36.4 °C) (!) 103 18 (!) 197/107 95 %       Pulse Oximetry Analysis - 95% on RA, normal  Rate: 95 bpm  Rhythm: Normal sinus      Provider Notes (Medical Decision Making):     DDX:  Contact dermatitis, vasculitis, essential hypertension, kidney dysfunction    Plan:  Labs, EKG, troponin x2, Decadron, hydroxyzine    Impression:  Pruritic rash    ED Course:   Initial assessment performed. The patients presenting problems have been discussed, and they are in agreement with the care plan formulated and outlined with them. I have encouraged them to ask questions as they arise throughout their visit. I reviewed the nursing notes and and vital signs from today's visit, as well as the electronic medical record system for any past medical records that were available that may contribute to the patients current condition, including previous urgent care visits for folliculitis    Nursing notes will be reviewed as they become available in realtime while the pt has been in the ED. Antonio Bell MD  HYPERTENSION COUNSELING:  Patient made aware of their elevated blood pressure and is instructed to follow up this week with their Primary Care or [address] [address] for a recheck (should they be discharged.) Patient is counseled regarding consequences of chronic, uncontrolled hypertension including kidney disease, heart disease, stroke or even death. Patient states their understanding      5:39 AM  Progress note:  Pt noted to be feeling better, ready for discharge. Discussed lab findings with pt, specifically noting improved blood pressure. Pt will follow up with primary care and dermatology as instructed. All questions have been answered, pt voiced understanding and agreement with plan. Specific return precautions provided in addition to instructions for pt to return to the ED immediately should sx worsen at any time. Antonio Bell MD           Critical Care Time:     none      Diagnosis     Clinical Impression:   1. Pruritic rash    2. Essential hypertension        PLAN:  1. Discharge Medication List as of 10/19/2022  5:27 AM        START taking these medications    Details   hydrOXYzine HCL (ATARAX) 50 mg tablet Take 1 Tablet by mouth every six (6) hours as needed for Itching for up to 10 days. , Normal, Disp-20 Tablet, R-0           CONTINUE these medications which have NOT CHANGED    Details   cephALEXin (KEFLEX) 500 mg capsule Take 1 Capsule by mouth four (4) times daily for 10 days. , Normal, Disp-40 Capsule, R-0      predniSONE (STERAPRED DS) 10 mg dose pack Take 1 Tablet by mouth See Admin Instructions. See administration instruction per 10mg dose pack, Normal, Disp-21 Tablet, R-0      zolpidem CR (AMBIEN CR) 12.5 mg tablet TAKE  FOR SLEEP AS NEEDED NIGHTLY, Normal, Disp-15 Tablet, R-0      diclofenac EC (VOLTAREN) 75 mg EC tablet Take 1 Tablet by mouth two (2) times daily (with meals). , Normal, Disp-60 Tablet, R-0      atorvastatin (LIPITOR) 20 mg tablet Take 1 tablet by mouth once daily, Normal, Disp-90 Tablet, R-0      aspirin 81 mg chewable tablet Take 1 Tablet by mouth two (2) times a day., Normal, Disp-60 Tablet, R-0      famotidine (PEPCID) 20 mg tablet Take 1 Tablet by mouth two (2) times a day., Normal, Disp-60 Tablet, R-0      meloxicam (MOBIC) 7.5 mg tablet Take 1 Tablet by mouth daily. , Normal, Disp-30 Tablet, R-1      omeprazole (PRILOSEC) 20 mg capsule Take 1 Capsule by mouth in the morning., Normal, Disp-90 Capsule, R-0      naproxen (NAPROSYN) 500 mg tablet Take 1 Tablet by mouth daily as needed for Pain., Normal, Disp-30 Tablet, R-0      valsartan-hydroCHLOROthiazide (DIOVAN-HCT) 160-12.5 mg per tablet Take 1 Tablet by mouth daily. DC valsartan,HCTZ, Normal, Disp-90 Tablet, R-1      cholecalciferol, vitamin D3, 50 mcg (2,000 unit) tab Take  by mouth., Historical Med      selenium 200 mcg Tab Take 400 mcg by mouth daily. , Historical Med      ascorbic acid, vitamin C, (VITAMIN C) 500 mg tablet Take  by mouth., Historical Med           2.    Follow-up Information       Follow up With Specialties Details Why Contact Info    Dinesh Brown MD Internal Medicine Physician Schedule an appointment as soon as possible for a visit in 2 days  [address]  [address] [facility]  [phone]      [facility] EMERGENCY DEPT Emergency Medicine   [place] [address] [address] [facility] [address]  [address] [facility]  Call today  [address] [facility]  [place] [id number] [phone]          Return to ED if worse     Disposition:  5:40 AM  The patient's results have been reviewed with family and/or caregiver. They verbally convey their understanding and agreement of the patient's signs, symptoms, diagnosis, treatment and prognosis and additionally agree to follow up as recommended in the discharge instructions or to return to the Emergency Room should the patient's condition change prior to their follow-up appointment. The family and/or caregiver verbally agrees with the care-plan and all of their questions have been answered. The discharge instructions have also been provided to the them with educational information regarding the patient's diagnosis as well a list of reasons why the patient would want to return to the ER prior to their follow-up appointment should their condition change.   Sukhwinder Fermin MD

## 2022-10-21 ENCOUNTER — OFFICE VISIT (OUTPATIENT)
Dept: INTERNAL MEDICINE CLINIC | Age: 62
End: 2022-10-21
Payer: COMMERCIAL

## 2022-10-21 VITALS
WEIGHT: 228 LBS | HEIGHT: 70 IN | OXYGEN SATURATION: 98 % | BODY MASS INDEX: 32.64 KG/M2 | SYSTOLIC BLOOD PRESSURE: 138 MMHG | RESPIRATION RATE: 12 BRPM | HEART RATE: 74 BPM | DIASTOLIC BLOOD PRESSURE: 86 MMHG

## 2022-10-21 DIAGNOSIS — I10 ESSENTIAL HYPERTENSION, BENIGN: ICD-10-CM

## 2022-10-21 DIAGNOSIS — L29.9 ITCHING: Primary | ICD-10-CM

## 2022-10-21 PROCEDURE — 99214 OFFICE O/P EST MOD 30 MIN: CPT | Performed by: INTERNAL MEDICINE

## 2022-10-21 RX ORDER — TRIAMCINOLONE ACETONIDE 1 MG/G
OINTMENT TOPICAL 2 TIMES DAILY
Qty: 30 G | Refills: 0 | Status: SHIPPED | OUTPATIENT
Start: 2022-10-21

## 2022-10-21 RX ORDER — PREDNISONE 20 MG/1
20 TABLET ORAL DAILY
Qty: 5 TABLET | Refills: 0 | Status: SHIPPED | OUTPATIENT
Start: 2022-10-21

## 2022-10-21 RX ORDER — HYDROXYZINE 50 MG/1
50 TABLET, FILM COATED ORAL
Qty: 30 TABLET | Refills: 1 | Status: SHIPPED | OUTPATIENT
Start: 2022-10-21 | End: 2022-11-05

## 2022-10-21 NOTE — PROGRESS NOTES
ADVISED PATIENT OF THE FOLLOWING HEALTH MAINTAINCE DUE  Health Maintenance Due   Topic Date Due    Shingrix Vaccine Age 49> (1 of 2) 07/28/2012    COVID-19 Vaccine (4 - Booster for [employer] series) 02/15/2022    Flu Vaccine (1) 08/01/2022      Chief Complaint   Patient presents with    Follow Up Chronic Condition    ED Follow-up     Bp was very elevated. Knee Pain     And swelling. Pt had knee surgery. 1. \"Have you been to the ER, urgent care clinic since your last visit? Hospitalized since your last visit? \"  Yes, 10/19/22    2. \"Have you seen or consulted any other health care providers outside of the [address] [place] [address] since your last visit? \" No     3. For patients aged 39-70: Has the patient had a colonoscopy / FIT/ Cologuard? Yes - Care Gap present. Most recent result on file      If the patient is female:    4. For patients aged 41-77: Has the patient had a mammogram within the past 2 years? NA - based on age or sex      11. For patients aged 21-65: Has the patient had a pap smear?  NA - based on age or sex

## 2022-10-22 NOTE — PROGRESS NOTES
HISTORY OF PRESENT ILLNESS  Penny Beck is a 63 y.o. male. Patient was seen for a for an ED follow up. Reports that he began with a rash over a week ago. It was around his ankles and foot. Denies any known allergies outside of medication. Has been taking antihistamines to help. Was placed on Keflex and Prednisone for cellulitis. A few days later his BP was elevated and he did not feel well. Was taken to the er. BP began to come down. Patient remains with a fine rash to the lower leg and ankle. Is continuing to itch. Has one day left of the taper. No fever. Has not spread anywhere else. The only thing different was being around a tomatoe garden. Prednisone and atarax are helping to an extent. Also used cold compresses.    Visit Vitals  /86 (BP 1 Location: Left upper arm, BP Patient Position: Sitting, BP Cuff Size: Large adult)   Pulse 74   Resp 12   Ht 5' 10\" (1.778 m)   Wt 228 lb (103.4 kg)   SpO2 98%   BMI 32.71 kg/m²     Past Medical History:   Diagnosis Date    CAD (coronary artery disease) 2016    mild; calcium scoring score=46    GERD (gastroesophageal reflux disease)     Hypertension     Mixed hyperlipidemia     LDL goal <70    Prediabetes 09/2016    Rectal polyp 11/10/14    Rotator cuff tear, left     Dr. Chuck Manning    Sleep disturbance      Past Surgical History:   Procedure Laterality Date    HX COLONOSCOPY  11/10/14    +rectal polyp (hyperplastic), Dr. Sarah Bauer KNEE ARTHROSCOPY Right 2006    HX MENISCUS REPAIR Right 06/21/2016    Dr. Rayna Rosen MOHS PROCEDURES Right 2008    HX ROTATOR CUFF REPAIR  09/2016    Dr. Chuck Manning     Family History   Problem Relation Age of Onset    Heart Disease Father         CAD, age 72    Diabetes Father     Cancer Father         lung--smoker    Cancer Paternal Uncle         lung - smoker     Outpatient Encounter Medications as of 10/21/2022   Medication Sig Dispense Refill    triamcinolone acetonide (KENALOG) 0.1 % ointment Apply  to affected area two (2) times a day. use thin layer 30 g 0    predniSONE (DELTASONE) 20 mg tablet Take 1 Tablet by mouth daily. 5 Tablet 0    hydrOXYzine HCL (ATARAX) 50 mg tablet Take 1 Tablet by mouth every six (6) hours as needed for Itching for up to 15 days. 30 Tablet 1    cephALEXin (KEFLEX) 500 mg capsule Take 1 Capsule by mouth four (4) times daily for 10 days. 40 Capsule 0    diclofenac EC (VOLTAREN) 75 mg EC tablet Take 1 Tablet by mouth two (2) times daily (with meals). 60 Tablet 0    atorvastatin (LIPITOR) 20 mg tablet Take 1 tablet by mouth once daily 90 Tablet 0    meloxicam (MOBIC) 7.5 mg tablet Take 1 Tablet by mouth daily. 30 Tablet 1    valsartan-hydroCHLOROthiazide (DIOVAN-HCT) 160-12.5 mg per tablet Take 1 Tablet by mouth daily. DC valsartan,HCTZ 90 Tablet 1    [DISCONTINUED] hydrOXYzine HCL (ATARAX) 50 mg tablet Take 1 Tablet by mouth every six (6) hours as needed for Itching for up to 10 days. 20 Tablet 0    [DISCONTINUED] predniSONE (STERAPRED DS) 10 mg dose pack Take 1 Tablet by mouth See Admin Instructions. See administration instruction per 10mg dose pack 21 Tablet 0    [DISCONTINUED] zolpidem CR (AMBIEN CR) 12.5 mg tablet TAKE  FOR SLEEP AS NEEDED NIGHTLY (Patient not taking: No sig reported) 15 Tablet 0    [DISCONTINUED] aspirin 81 mg chewable tablet Take 1 Tablet by mouth two (2) times a day. (Patient not taking: No sig reported) 60 Tablet 0    [DISCONTINUED] famotidine (PEPCID) 20 mg tablet Take 1 Tablet by mouth two (2) times a day. (Patient not taking: No sig reported) 60 Tablet 0    [DISCONTINUED] omeprazole (PRILOSEC) 20 mg capsule Take 1 Capsule by mouth in the morning. (Patient not taking: No sig reported) 90 Capsule 0    [DISCONTINUED] naproxen (NAPROSYN) 500 mg tablet Take 1 Tablet by mouth daily as needed for Pain. (Patient not taking: No sig reported) 30 Tablet 0    [DISCONTINUED] cholecalciferol, vitamin D3, 50 mcg (2,000 unit) tab Take  by mouth.  (Patient not taking: No sig reported) selenium 200 mcg Tab Take 400 mcg by mouth daily. (Patient not taking: No sig reported)      [DISCONTINUED] ascorbic acid, vitamin C, (VITAMIN C) 500 mg tablet Take  by mouth. (Patient not taking: No sig reported)       No facility-administered encounter medications on file as of 10/21/2022. HPI    Review of Systems   Constitutional:  Negative for fever, malaise/fatigue and weight loss. Respiratory:  Negative for cough. Cardiovascular:  Negative for chest pain and palpitations. Gastrointestinal: Negative. Musculoskeletal: Negative. Skin:  Positive for itching and rash. Neurological: Negative. Psychiatric/Behavioral: Negative. Physical Exam  Vitals and nursing note reviewed. Cardiovascular:      Rate and Rhythm: Normal rate and regular rhythm. Pulmonary:      Effort: Pulmonary effort is normal.      Breath sounds: Normal breath sounds. Abdominal:      Palpations: Abdomen is soft. Musculoskeletal:      Right lower leg: No edema. Left lower leg: No edema. Legs:    Skin:     General: Skin is warm. Neurological:      Mental Status: He is alert and oriented to person, place, and time. Psychiatric:         Behavior: Behavior normal.       ASSESSMENT and PLAN  Diagnoses and all orders for this visit:    1. Itching  -     triamcinolone acetonide (KENALOG) 0.1 % ointment; Apply  to affected area two (2) times a day. use thin layer  -     REFERRAL TO ALLERGY  -     predniSONE (DELTASONE) 20 mg tablet; Take 1 Tablet by mouth daily. -     hydrOXYzine HCL (ATARAX) 50 mg tablet; Take 1 Tablet by mouth every six (6) hours as needed for Itching for up to 15 days. -     Informed patient that for allergy testing PO steroids would have to out of the system to get a better response. 2. Essential hypertension, benign        -    DASH diet. Goal of 140/80. Continue Diovan 160-12.5 mg tablet. Reviewed that steroid use can also increase BP.      Follow-up and Dispositions Return if symptoms worsen or fail to improve.       lab results and schedule of future lab studies reviewed with patient  reviewed diet, exercise and weight control  reviewed medications and side effects in detail

## 2022-10-26 ENCOUNTER — OFFICE VISIT (OUTPATIENT)
Dept: ORTHOPEDIC SURGERY | Age: 62
End: 2022-10-26
Payer: COMMERCIAL

## 2022-10-26 DIAGNOSIS — Z96.651 S/P RIGHT UNICOMPARTMENTAL KNEE REPLACEMENT: ICD-10-CM

## 2022-10-26 DIAGNOSIS — M25.561 POSTOPERATIVE PAIN OF RIGHT KNEE: Primary | ICD-10-CM

## 2022-10-26 DIAGNOSIS — G89.18 POSTOPERATIVE PAIN OF RIGHT KNEE: Primary | ICD-10-CM

## 2022-10-26 DIAGNOSIS — R26.2 DIFFICULTY WALKING: ICD-10-CM

## 2022-10-26 PROCEDURE — 97110 THERAPEUTIC EXERCISES: CPT | Performed by: PHYSICAL THERAPIST

## 2022-10-26 PROCEDURE — 97140 MANUAL THERAPY 1/> REGIONS: CPT | Performed by: PHYSICAL THERAPIST

## 2022-10-26 PROCEDURE — 97112 NEUROMUSCULAR REEDUCATION: CPT | Performed by: PHYSICAL THERAPIST

## 2022-10-26 NOTE — PROGRESS NOTES
PT DAILY TREATMENT NOTE    Patient Name: Jonathan Ruggiero. Date:10/26/2022  : 1960  [x]  Patient  Verified  Payor: BLUE CROSS / Plan: Diamond Roberson [id number] PPO / Product Type: PPO /    Total Treatment Time (min): 65+  Referring Provider: Tere Kent PA-C    1. Postoperative pain of right knee  2. Difficulty walking  3. S/P right unicompartmental knee replacement    SUBJECTIVE  Subjective functional status/changes:   [] No changes reported    Patient reports he missed last week because of his rash on his legs and going to MD appointments. His rash is better and is slowly resolving. He has no complaints regarding his knee. OBJECTIVE/TREATMENT  AROM: ext:-10 degrees  Manual Therapy x 20 mins:   PF/TF mobilizations to affected limb to promote improved joint mobility. PROM for knee flexion and extension mobility. STM/MFR to the distal IT band, quadriceps, peripatellar structures and popliteal musculature. Passive quad and hamstring stretching in supine and with leg off table. Neuromuscular Re-education (NMR) x 10 minutes:  [x]  Kinesiotaping applied in a peripatellar \"Y\" pattern to promote lymphatic drainage. []  Neuromuscular reeducation to the VMO with use of [place] electrical stimulation in conjunction with active contraction and exercises. [x]  balance/proprioceptive exercises and activities in clinic as listed below as NMR. Therapeutic Exercise x 30 mins:   Strengthening/Endurance/ADL function/Neuromuscular reeducation activities/exercises supervised and completed as listed below.       EXERCISE X= completed on  10/26/2022   slantboard x   SAQ/LAQ    TKE [facility]   Balance board *NMR x   Extension prop 7#   TG press Lv 16   SLS on foam *NMR x   Lat steps on foam x   Leg press B 120#, U 60#                                               Added/Changed Exercises:  [x]  Advanced to address: [x] functional strength/ROM deficits [x] balance/proprioceptive tasks  []  Modified: [] per subjective reports [] for patient time constraints [] for clinic time constraints    Modality:  []  E-Stim: type _ x _ min     []att   []unatt   []w/ice   []w/heat  []  Ultrasound: []cont   []pulse    _ W/cm2 x _  min   []1MHz   []3MHz  [x]  Ice pack: post      []  Hot pack: pre    []  Other:     Patient Education: [] Review HEP    [] Progressed/Changed HEP to include:  [] positioning   [] body mechanics   [] transfers   [] heat/ice application      ASSESSMENT  Patient has continued visible dermatitis which is slowly resolving. Patient continues to lack full extension. Patient slowly returning to premorbid activities w/no real c/o pain.    Progress towards goals / Updated goals:    PLAN  [x]  Upgrade activities as tolerated      [x]  Continue plan of care  []  Discharge due to:  []  Other:      Co-treatment by Darryl Martínez, PT 10/26/2022

## 2022-11-02 ENCOUNTER — OFFICE VISIT (OUTPATIENT)
Dept: ORTHOPEDIC SURGERY | Age: 62
End: 2022-11-02
Payer: COMMERCIAL

## 2022-11-02 DIAGNOSIS — Z96.651 S/P RIGHT UNICOMPARTMENTAL KNEE REPLACEMENT: ICD-10-CM

## 2022-11-02 DIAGNOSIS — R26.2 DIFFICULTY WALKING: ICD-10-CM

## 2022-11-02 DIAGNOSIS — G89.18 POSTOPERATIVE PAIN OF RIGHT KNEE: Primary | ICD-10-CM

## 2022-11-02 DIAGNOSIS — M25.561 POSTOPERATIVE PAIN OF RIGHT KNEE: Primary | ICD-10-CM

## 2022-11-02 PROCEDURE — 97110 THERAPEUTIC EXERCISES: CPT

## 2022-11-02 PROCEDURE — 97112 NEUROMUSCULAR REEDUCATION: CPT

## 2022-11-02 PROCEDURE — 97140 MANUAL THERAPY 1/> REGIONS: CPT

## 2022-11-02 NOTE — PROGRESS NOTES
I have reviewed the notes, assessments, and/or procedures performed by [facility] TYLER, I concur with her/his documentation of Dick Palacios

## 2022-11-09 ENCOUNTER — OFFICE VISIT (OUTPATIENT)
Dept: ORTHOPEDIC SURGERY | Age: 62
End: 2022-11-09
Payer: COMMERCIAL

## 2022-11-09 DIAGNOSIS — M25.561 POSTOPERATIVE PAIN OF RIGHT KNEE: Primary | ICD-10-CM

## 2022-11-09 DIAGNOSIS — Z96.651 S/P RIGHT UNICOMPARTMENTAL KNEE REPLACEMENT: ICD-10-CM

## 2022-11-09 DIAGNOSIS — R26.2 DIFFICULTY WALKING: ICD-10-CM

## 2022-11-09 DIAGNOSIS — G89.18 POSTOPERATIVE PAIN OF RIGHT KNEE: Primary | ICD-10-CM

## 2022-11-09 PROCEDURE — 97140 MANUAL THERAPY 1/> REGIONS: CPT

## 2022-11-09 PROCEDURE — 97112 NEUROMUSCULAR REEDUCATION: CPT

## 2022-11-09 PROCEDURE — 97110 THERAPEUTIC EXERCISES: CPT

## 2022-11-09 NOTE — PROGRESS NOTES
I have reviewed the notes, assessments, and/or procedures performed by Arin Chávez PTA, I concur with her/his documentation of Smiley Chung

## 2022-11-09 NOTE — PROGRESS NOTES
PT DAILY TREATMENT NOTE    Patient Name: Hoda Pena. Date:2022  : 1960  [x]  Patient  Verified  Payor: BLUE CROSS / Plan: Diamond Roberson [id number] PPO / Product Type: PPO /    Total Treatment Time (min): 65+  Referring Provider: Tulio Hill MD    1. Postoperative pain of right knee  2. Difficulty walking  3. S/P right unicompartmental knee replacement    SUBJECTIVE  Subjective functional status/changes:   [] No changes reported    Patient reports he feels like he is walking better. OBJECTIVE/TREATMENT    Manual Therapy x 20 mins:   PF/TF mobilizations to affected limb to promote improved joint mobility. PROM for knee flexion and extension mobility. STM/MFR to the distal IT band, quadriceps, peripatellar structures and popliteal musculature. Passive quad and hamstring stretching in supine and with leg off table. Neuromuscular Re-education (NMR) x 10 minutes:  []  Kinesiotaping applied in a peripatellar \"Y\" pattern to promote lymphatic drainage. []  Neuromuscular reeducation to the VMO with use of [place] electrical stimulation in conjunction with active contraction and exercises. [x]  balance/proprioceptive exercises and activities in clinic as listed below as NMR. Therapeutic Exercise x 30 mins:   Strengthening/Endurance/ADL function/Neuromuscular reeducation activities/exercises supervised and completed as listed below.       EXERCISE X= completed on  2022   slantboard x   SAQ/LAQ    TKE grn   Balance board *NMR x   Extension prop 7#   TG press Lv 16   SLS on foam *NMR x   Lat steps on foam x   Leg press B 120#, U 60#   ABCs w/ball *NMR x   4way gait blue   Retro walking on TM 5'                                   Added/Changed Exercises:  [x]  Advanced to address: [x] functional strength/ROM deficits [x] balance/proprioceptive tasks  []  Modified: [] per subjective reports [] for patient time constraints [] for clinic time constraints    Modality:  []  E-Stim: type _ x _ min     []att   []unatt   []w/ice   []w/heat  []  Ultrasound: []cont   []pulse    _ W/cm2 x _  min   []1MHz   []3MHz  [x]  Ice pack: post      []  Hot pack: pre    []  Other:     Patient Education: [] Review HEP    [] Progressed/Changed HEP to include:  [] positioning   [] body mechanics   [] transfers   [] heat/ice application      ASSESSMENT    Demonstrates good active knee extension walking in reverse but has difficulty with carry over to walking forward.      Progress towards goals / Updated goals:    PLAN  [x]  Upgrade activities as tolerated      [x]  Continue plan of care  []  Discharge due to:  []  Other:      Co-treatment by Pawan Peres, PTA 11/9/2022

## 2022-11-15 ENCOUNTER — PATIENT MESSAGE (OUTPATIENT)
Dept: INTERNAL MEDICINE CLINIC | Age: 62
End: 2022-11-15

## 2022-11-15 ENCOUNTER — OFFICE VISIT (OUTPATIENT)
Dept: ORTHOPEDIC SURGERY | Age: 62
End: 2022-11-15
Payer: COMMERCIAL

## 2022-11-15 DIAGNOSIS — G89.18 POSTOPERATIVE PAIN OF RIGHT KNEE: Primary | ICD-10-CM

## 2022-11-15 DIAGNOSIS — Z96.651 S/P RIGHT UNICOMPARTMENTAL KNEE REPLACEMENT: ICD-10-CM

## 2022-11-15 DIAGNOSIS — R26.2 DIFFICULTY WALKING: ICD-10-CM

## 2022-11-15 DIAGNOSIS — M25.561 POSTOPERATIVE PAIN OF RIGHT KNEE: Primary | ICD-10-CM

## 2022-11-15 PROCEDURE — 97112 NEUROMUSCULAR REEDUCATION: CPT

## 2022-11-15 PROCEDURE — 97110 THERAPEUTIC EXERCISES: CPT

## 2022-11-15 PROCEDURE — 97140 MANUAL THERAPY 1/> REGIONS: CPT

## 2022-11-15 NOTE — PROGRESS NOTES
I have reviewed the notes, assessments, and/or procedures performed by Arin Chávez PTA, I concur with her/his documentation of Smiley Harry .  PT DAILY TREATMENT NOTE    Patient Name: Smiley Harry Date:11/15/2022  : 1960  [x]  Patient  Verified  Payor: BLUE CROSS / Plan: Treinta DAVID Okeefes [id number] PPO / Product Type: PPO /    Total Treatment Time (min): 65+  Referring Provider: Nora Reed MD    1. Postoperative pain of right knee  2. Difficulty walking  3. S/P right unicompartmental knee replacement    SUBJECTIVE  Subjective functional status/changes:   [] No changes reported    Patient reports he has been trying to focus on getting his knee straight when he is walking. He states he has noticed a big improvement in mobility and swelling over the past week. OBJECTIVE/TREATMENT    Manual Therapy x 20 mins:   PF/TF mobilizations to affected limb to promote improved joint mobility. PROM for knee flexion and extension mobility. STM/MFR to the distal IT band, quadriceps, peripatellar structures and popliteal musculature. Passive quad and hamstring stretching in supine and with leg off table. Neuromuscular Re-education (NMR) x 10 minutes:  []  Kinesiotaping applied in a peripatellar \"Y\" pattern to promote lymphatic drainage. []  Neuromuscular reeducation to the VMO with use of [place] electrical stimulation in conjunction with active contraction and exercises. [x]  balance/proprioceptive exercises and activities in clinic as listed below as NMR. Therapeutic Exercise x 30 mins:   Strengthening/Endurance/ADL function/Neuromuscular reeducation activities/exercises supervised and completed as listed below.       EXERCISE X= completed on  11/15/2022   slantboard x   SAQ/LAQ    TKE grn   Balance board *NMR x   Extension prop 7#   TG press Lv 16   SLS on foam *NMR x   Lat steps on foam x   Leg press B 120#, U 60#   ABCs w/ball *NMR x   4way gait blue   Retro walking on TM 5' [facility] march x                               Added/Changed Exercises:  [x]  Advanced to address: [x] functional strength/ROM deficits [x] balance/proprioceptive tasks  []  Modified: [] per subjective reports [] for patient time constraints [] for clinic time constraints    Modality:  []  E-Stim: type _ x _ min     []att   []unatt   []w/ice   []w/heat  []  Ultrasound: []cont   []pulse    _ W/cm2 x _  min   []1MHz   []3MHz  [x]  Ice pack: post      []  Hot pack: pre    []  Other:     Patient Education: [] Review HEP    [] Progressed/Changed HEP to include:  [] positioning   [] body mechanics   [] transfers   [] heat/ice application      ASSESSMENT    Better passive extension with mobilizations. Quad strength improving. Joint effusion still present.      Progress towards goals / Updated goals:    PLAN  [x]  Upgrade activities as tolerated      [x]  Continue plan of care  []  Discharge due to:  []  Other:      Co-treatment by Ana María Luevano PTA 11/15/2022

## 2022-11-17 ENCOUNTER — OFFICE VISIT (OUTPATIENT)
Dept: ORTHOPEDIC SURGERY | Age: 62
End: 2022-11-17
Payer: COMMERCIAL

## 2022-11-17 DIAGNOSIS — R26.2 DIFFICULTY WALKING: ICD-10-CM

## 2022-11-17 DIAGNOSIS — Z96.651 S/P RIGHT UNICOMPARTMENTAL KNEE REPLACEMENT: ICD-10-CM

## 2022-11-17 DIAGNOSIS — M25.561 POSTOPERATIVE PAIN OF RIGHT KNEE: Primary | ICD-10-CM

## 2022-11-17 DIAGNOSIS — G89.18 POSTOPERATIVE PAIN OF RIGHT KNEE: Primary | ICD-10-CM

## 2022-11-17 PROCEDURE — 97110 THERAPEUTIC EXERCISES: CPT

## 2022-11-17 PROCEDURE — 97112 NEUROMUSCULAR REEDUCATION: CPT

## 2022-11-17 PROCEDURE — 97140 MANUAL THERAPY 1/> REGIONS: CPT

## 2022-11-17 NOTE — PROGRESS NOTES
I have reviewed the notes, assessments, and/or procedures performed by Oniel Allen PTA, I concur with her/his documentation of Meme Castillo. .  PT DAILY TREATMENT NOTE    Patient Name: Meme Castillo. Date:2022  : 1960  [x]  Patient  Verified  Payor: BLUE CROSS / Plan: Trerossi Okeefes [id number] PPO / Product Type: PPO /    Total Treatment Time (min): 65+  Referring Provider: Syl Hill PA-C    1. Postoperative pain of right knee  2. Difficulty walking  3. S/P right unicompartmental knee replacement    SUBJECTIVE  Subjective functional status/changes:   [] No changes reported    Patient reports he is a little stiff today. OBJECTIVE/TREATMENT    Manual Therapy x 20 mins:   PF/TF mobilizations to affected limb to promote improved joint mobility. PROM for knee flexion and extension mobility. STM/MFR to the distal IT band, quadriceps, peripatellar structures and popliteal musculature. Passive quad and hamstring stretching in supine and with leg off table. Neuromuscular Re-education (NMR) x 10 minutes:  []  Kinesiotaping applied in a peripatellar \"Y\" pattern to promote lymphatic drainage. []  Neuromuscular reeducation to the VMO with use of [place] electrical stimulation in conjunction with active contraction and exercises. [x]  balance/proprioceptive exercises and activities in clinic as listed below as NMR. Therapeutic Exercise x 30 mins:   Strengthening/Endurance/ADL function/Neuromuscular reeducation activities/exercises supervised and completed as listed below.       EXERCISE X= completed on  2022   slantboard x   SAQ/LAQ    TKE [facility]   Balance board *NMR x   Extension prop 7#   TG press Lv 16   SLS on foam *NMR x   Lat steps on foam x   Leg press B 120#, U 60#   ABCs w/ball *NMR x   4way gait purple   Retro walking on TM  x                               Added/Changed Exercises:  [x]  Advanced to address: [x] functional strength/ROM deficits [x] balance/proprioceptive tasks  []  Modified: [] per subjective reports [] for patient time constraints [] for clinic time constraints    Modality:  []  E-Stim: type _ x _ min     []att   []unatt   []w/ice   []w/heat  []  Ultrasound: []cont   []pulse    _ W/cm2 x _  min   []1MHz   []3MHz  [x]  Ice pack: post      []  Hot pack: pre    []  Other:     Patient Education: [] Review HEP    [] Progressed/Changed HEP to include:  [] positioning   [] body mechanics   [] transfers   [] heat/ice application      ASSESSMENT    Extension ROM improving actively. Still with quad weakness.     Progress towards goals / Updated goals:    PLAN  [x]  Upgrade activities as tolerated      [x]  Continue plan of care  []  Discharge due to:  []  Other:      Co-treatment by Pawan Peres, [facility] 11/17/2022

## 2022-11-21 NOTE — PROGRESS NOTES
I have reviewed the notes, assessments, and/or procedures performed by Nancy Roman PTA, I concur with her/his documentation of Luciana Dukes. Anna Aguayo

## 2022-12-01 DIAGNOSIS — F51.01 PRIMARY INSOMNIA: ICD-10-CM

## 2022-12-02 ENCOUNTER — OFFICE VISIT (OUTPATIENT)
Dept: ORTHOPEDIC SURGERY | Age: 62
End: 2022-12-02
Payer: COMMERCIAL

## 2022-12-02 DIAGNOSIS — Z96.651 STATUS POST RIGHT UNICOMPARTMENTAL KNEE REPLACEMENT: Primary | ICD-10-CM

## 2022-12-02 DIAGNOSIS — R26.2 DIFFICULTY WALKING: ICD-10-CM

## 2022-12-02 DIAGNOSIS — M25.561 POSTOPERATIVE PAIN OF RIGHT KNEE: ICD-10-CM

## 2022-12-02 DIAGNOSIS — G89.18 POSTOPERATIVE PAIN OF RIGHT KNEE: ICD-10-CM

## 2022-12-02 DIAGNOSIS — Z96.651 STATUS POST RIGHT UNICOMPARTMENTAL KNEE REPLACEMENT: ICD-10-CM

## 2022-12-02 RX ORDER — ZOLPIDEM TARTRATE 12.5 MG/1
TABLET, FILM COATED, EXTENDED RELEASE ORAL
Qty: 15 TABLET | Refills: 0 | Status: SHIPPED | OUTPATIENT
Start: 2022-12-02

## 2022-12-02 NOTE — PROGRESS NOTES
PT DAILY TREATMENT NOTE    Patient Name: Jonathan Ruggiero. Date:2022  : 1960  [x]  Patient  Verified  Payor: BLUE CROSS / Plan: Diamond Roberson [id number] PPO / Product Type: PPO /    Total Treatment Time (min): 65+  Referring Provider: Tere Kent PA-C    1. Status post right unicompartmental knee replacement  2. Postoperative pain of right knee  3. Difficulty walking    SUBJECTIVE  Subjective functional status/changes:   [] No changes reported    Patient reports his knee feels better each week, but still swells up from time to time. OBJECTIVE/TREATMENT    Manual Therapy x 20 mins:   PF/TF mobilizations to affected limb to promote improved joint mobility. PROM for knee flexion and extension mobility. STM/MFR to the distal IT band, quadriceps, peripatellar structures and popliteal musculature. Passive quad and hamstring stretching in supine and with leg off table. Neuromuscular Re-education (NMR) x 10 minutes:  []  Kinesiotaping applied in a peripatellar \"Y\" pattern to promote lymphatic drainage. []  Neuromuscular reeducation to the VMO with use of [place] electrical stimulation in conjunction with active contraction and exercises. [x]  balance/proprioceptive exercises and activities in clinic as listed below as NMR. Therapeutic Exercise x 30 mins:   Strengthening/Endurance/ADL function/Neuromuscular reeducation activities/exercises supervised and completed as listed below.       EXERCISE X= completed on  2022   slantboard x   SAQ/LAQ    TKE grn   Balance board *NMR x   Extension prop 7#   TG press B/uni Lv    SLS on foam *NMR x   Lat steps on foam x   Leg press B 120#, U 60#   ABCs w/ball *NMR x   4way gait purple   Walking lunges x   [facility] march x                               Added/Changed Exercises:  [x]  Advanced to address: [x] functional strength/ROM deficits [x] balance/proprioceptive tasks  []  Modified: [] per subjective reports [] for patient time constraints [] for clinic time constraints    Modality:  []  E-Stim: type _ x _ min     []att   []unatt   []w/ice   []w/heat  []  Ultrasound: []cont   []pulse    _ W/cm2 x _  min   []1MHz   []3MHz  [x]  Ice pack: post      []  Hot pack: pre    []  Other:     Patient Education: [] Review HEP    [] Progressed/Changed HEP to include:  [] positioning   [] body mechanics   [] transfers   [] heat/ice application      ASSESSMENT    Generally doing well. Strength/ROM improving. Still with swelling/joint effusion. We will follow-up for one more appointment to address any questions/issues that may arise as he increases activity levels. Plan D/C at that time.      Progress towards goals / Updated goals:    PLAN  [x]  Upgrade activities as tolerated      [x]  Continue plan of care  []  Discharge due to:  []  Other:      Co-treatment by Fredy Mckeon, PTA 12/2/2022

## 2022-12-05 NOTE — PROGRESS NOTES
I have reviewed the notes, assessments, and/or procedures performed by Joo Solano PTA, I concur with her/his documentation of Colette Moody. Jordy Andrews

## 2022-12-08 RX ORDER — ATORVASTATIN CALCIUM 20 MG/1
TABLET, FILM COATED ORAL
Qty: 90 TABLET | Refills: 0 | Status: SHIPPED | OUTPATIENT
Start: 2022-12-08

## 2022-12-09 ENCOUNTER — OFFICE VISIT (OUTPATIENT)
Dept: ORTHOPEDIC SURGERY | Age: 62
End: 2022-12-09
Payer: COMMERCIAL

## 2022-12-09 VITALS — BODY MASS INDEX: 32.64 KG/M2 | HEIGHT: 70 IN | WEIGHT: 228 LBS

## 2022-12-09 DIAGNOSIS — Z98.890 STATUS POST SURGERY: Primary | ICD-10-CM

## 2022-12-09 DIAGNOSIS — T84.84XA PAIN DUE TO KNEE JOINT PROSTHESIS, INITIAL ENCOUNTER (HCC): ICD-10-CM

## 2022-12-09 DIAGNOSIS — Z96.659 PAIN DUE TO KNEE JOINT PROSTHESIS, INITIAL ENCOUNTER (HCC): ICD-10-CM

## 2022-12-09 RX ORDER — DICLOFENAC SODIUM 50 MG/1
50 TABLET, DELAYED RELEASE ORAL 2 TIMES DAILY WITH MEALS
Qty: 60 TABLET | Refills: 0 | Status: SHIPPED | OUTPATIENT
Start: 2022-12-09

## 2022-12-09 NOTE — PROGRESS NOTES
Chantel Denton (: 1960) is a 63 y.o. male patient, here for evaluation of the following chief complaint(s):  Surgical Follow-up (Right knee follow up/)       ASSESSMENT/PLAN:  Below is the assessment and plan developed based on review of pertinent history, physical exam, labs, studies, and medications. Radiographs reviewed including 3 views of the right knee. Status post right partial knee arthroplasty. No evidence of aseptic loosening. Overall alignment is appropriate. Patella tracking centrally. Assessment and plan: Status post right knee arthroplasty. The patient is very happy with  progress and is doing well. I would like to see them back in 9 months. He is some mild swelling. I told him I would give him a prescription for diclofenac. Risk benefits discussed    1. Status post surgery      Encounter Diagnosis   Name Primary? Status post surgery Yes        No follow-ups on file. SUBJECTIVE/OBJECTIVE:  Chantel Denton (: 1960) is a 63 y.o. male who presents today for the following:  Chief Complaint   Patient presents with    Surgical Follow-up     Right knee follow up         Status post partial knee replacement. He is walking. Well bruising as above. He has no pain while straightening the knee fully. Has mild pain with twisting his knee. He has mild pain with going up and on stairs. He has no pain standing upright. He has mild pain rising up sitting. He said he used a walker for 1 day. IMAGING:  XR Results (most recent):  Results from Appointment encounter on 22    XR KNEE RT 3 V    Narrative  Radiographs reviewed including 3 views of the right knee. Status post partial right knee arthroplasty. No evidence of aseptic loosening. Overall alignment is appropriate. Patella tracking centrally.        Allergies   Allergen Reactions    Bee Sting [Sting, Bee] Anaphylaxis    Amlodipine Other (comments)     Increased GERD    Demerol [Meperidine] Unknown (comments)     Doubles over in pain    Lisinopril Cough and Other (comments)     Fatigue         Current Outpatient Medications   Medication Sig    diclofenac EC (VOLTAREN) 50 mg EC tablet Take 1 Tablet by mouth two (2) times daily (with meals). atorvastatin (LIPITOR) 20 mg tablet Take 1 tablet by mouth once daily    zolpidem CR (AMBIEN CR) 12.5 mg tablet TAKE FOR SLEEP AS NEEDED NIGHTLY    triamcinolone acetonide (KENALOG) 0.1 % ointment Apply  to affected area two (2) times a day. use thin layer    predniSONE (DELTASONE) 20 mg tablet Take 1 Tablet by mouth daily. valsartan-hydroCHLOROthiazide (DIOVAN-HCT) 160-12.5 mg per tablet Take 1 Tablet by mouth daily. DC valsartan,HCTZ     No current facility-administered medications for this visit. Past Medical History:   Diagnosis Date    CAD (coronary artery disease) 2016    mild; calcium scoring score=46    GERD (gastroesophageal reflux disease)     Hypertension     Mixed hyperlipidemia     LDL goal <70    Prediabetes 09/2016    Rectal polyp 11/10/14    Rotator cuff tear, left     Dr. Melissa Ruby    Sleep disturbance         Past Surgical History:   Procedure Laterality Date    HX COLONOSCOPY  11/10/14    +rectal polyp (hyperplastic), Dr. Dane Amato KNEE ARTHROSCOPY Right 2006    HX MENISCUS REPAIR Right 06/21/2016    Dr. Vickie Chakraborty MOHS PROCEDURES Right 2008    HX ROTATOR CUFF REPAIR  09/2016    Dr. Melissa Ruby       Family History   Problem Relation Age of Onset    Heart Disease Father         CAD, age 72    Diabetes Father     Cancer Father         lung--smoker    Cancer Paternal Uncle         lung - smoker        Social History     Tobacco Use    Smoking status: Never    Smokeless tobacco: Never   Substance Use Topics    Alcohol use:  Yes     Alcohol/week: 3.0 - 5.0 standard drinks     Types: 3 - 5 Standard drinks or equivalent per week     Comment: social        All systems reviewed x 12 and were negative with the exception of None      No flowsheet data found. Vitals:  Ht 5' 10\" (1.778 m)   Wt 228 lb (103.4 kg)   BMI 32.71 kg/m²    Body mass index is 32.71 kg/m². Physical Exam    Gen: NAD    Resp: Non-labored    RLE: Midline incision is healing well with no evidence of infection. No erythema. Range of motion 0-120°. No extensor lag. Grossly stable in the coronal and sagittal planes. No calf tenderness. No evidence of a DVT. Motor grossly intact with 5 out of 5 strength. Sensation intact to light touch throughout. Palpable pedal pulses. An electronic signature was used to authenticate this note.   -- Vicki Bhagat MD

## 2022-12-14 ENCOUNTER — OFFICE VISIT (OUTPATIENT)
Dept: ORTHOPEDIC SURGERY | Age: 62
End: 2022-12-14
Payer: COMMERCIAL

## 2022-12-14 DIAGNOSIS — G89.18 POSTOPERATIVE PAIN OF RIGHT KNEE: ICD-10-CM

## 2022-12-14 DIAGNOSIS — Z96.651 STATUS POST RIGHT UNICOMPARTMENTAL KNEE REPLACEMENT: ICD-10-CM

## 2022-12-14 DIAGNOSIS — M25.561 POSTOPERATIVE PAIN OF RIGHT KNEE: ICD-10-CM

## 2022-12-14 DIAGNOSIS — Z98.890 STATUS POST SURGERY: Primary | ICD-10-CM

## 2022-12-14 NOTE — PROGRESS NOTES
PT DAILY TREATMENT NOTE    Patient Name: Colette Moody. Date:2022  : 1960  [x]  Patient  Verified  Payor: BLUE CROSS / Plan: Diamond Roberson [id number] PPO / Product Type: PPO /    Total Treatment Time (min): 65+  Referring Provider: Noble May PA-C    1. Status post surgery  2. Status post right unicompartmental knee replacement  3. Postoperative pain of right knee    SUBJECTIVE  Subjective functional status/changes:   [] No changes reported    Patient c/o continued stiffness and swelling. States MD pleased w/progress. OBJECTIVE/TREATMENT  AROM knee flexion:125 degrees  Manual Therapy x 20 mins:   PF/TF mobilizations to affected limb to promote improved joint mobility. PROM for knee flexion and extension mobility. STM/MFR to the distal IT band, quadriceps, peripatellar structures and popliteal musculature. Passive quad and hamstring stretching in supine and with leg off table. Neuromuscular Re-education (NMR) x 10 minutes:  []  Kinesiotaping applied in a peripatellar \"Y\" pattern to promote lymphatic drainage. []  Neuromuscular reeducation to the VMO with use of [place] electrical stimulation in conjunction with active contraction and exercises. [x]  balance/proprioceptive exercises and activities in clinic as listed below as NMR. Therapeutic Exercise x 30 mins:   Strengthening/Endurance/ADL function/Neuromuscular reeducation activities/exercises supervised and completed as listed below.       EXERCISE X= completed on  2022   slantboard x   SAQ/LAQ 8   TKE grn   Balance board *NMR x   Extension prop 7#   TG press B/uni Lv /   SLS on foam *NMR x   Lat steps on foam x   Leg press B 120#, U 60#   ABCs w/ball *NMR x   4way gait purple   Walking lunges x   [facility] march x                               Added/Changed Exercises:  [x]  Advanced to address: [x] functional strength/ROM deficits [x] balance/proprioceptive tasks  []  Modified: [] per subjective reports [] for patient time constraints [] for clinic time constraints    Modality:  []  E-Stim: type _ x _ min     []att   []unatt   []w/ice   []w/heat  []  Ultrasound: []cont   []pulse    _ W/cm2 x _  min   []1MHz   []3MHz  [x]  Ice pack: post      []  Hot pack: pre    []  Other:     Patient Education: [x] Review HEP    [] Progressed/Changed HEP to include:  [] positioning   [] body mechanics   [] transfers   [] heat/ice application      ASSESSMENT  Patient has made great progress in PT. Slowly returning to premorbid activities. C/o stiffness and swelling. Has increased overall quad control. D/c to HEP.   Progress towards goals / Updated goals:    PLAN  []  Upgrade activities as tolerated      []  Continue plan of care  [x]  Discharge due to: all goals met  []  Other:      Co-treatment by Lou Carrasco, PT 12/14/2022

## 2023-01-12 DIAGNOSIS — F51.01 PRIMARY INSOMNIA: ICD-10-CM

## 2023-01-12 RX ORDER — ZOLPIDEM TARTRATE 12.5 MG/1
TABLET, FILM COATED, EXTENDED RELEASE ORAL
Qty: 15 TABLET | Refills: 0 | Status: SHIPPED | OUTPATIENT
Start: 2023-01-12

## 2023-01-24 ENCOUNTER — TELEPHONE (OUTPATIENT)
Dept: INTERNAL MEDICINE CLINIC | Age: 63
End: 2023-01-24

## 2023-01-24 NOTE — LETTER
1/24/2023 12:16 PM    Mr. Deedee Michel. ANGELICA Alexis        To Whom This will Concern,         My name is Zenobia Perkins MD, and I am the primary care provider for Deedee Michel. here at [facility]. Kourtney Chiang is under my care and I am informing you that he is NO LONGER taking Trazodone for sleep, this medication is no longer being used to treat sleep hygiene (this was stopped in 2021). Please call my office with any further questions.              Sincerely,            Zenobia Perkins MD

## 2023-01-24 NOTE — TELEPHONE ENCOUNTER
Pt needs a letter stating that the trazodone was prescribed in 2021 for insomnia and that it was discontinued Letter needs to state that patient is no longer taking that medication. Please have this signed and sent to pt my chart when done.  If any issues please contact pt

## 2023-01-26 RX ORDER — DICLOFENAC SODIUM 75 MG/1
75 TABLET, DELAYED RELEASE ORAL 2 TIMES DAILY WITH MEALS
Qty: 60 TABLET | Refills: 0 | OUTPATIENT
Start: 2023-01-26

## 2023-02-24 DIAGNOSIS — I10 ESSENTIAL HYPERTENSION, BENIGN: ICD-10-CM

## 2023-02-24 RX ORDER — VALSARTAN AND HYDROCHLOROTHIAZIDE 160; 12.5 MG/1; MG/1
TABLET, FILM COATED ORAL
Qty: 30 TABLET | Refills: 0 | Status: SHIPPED | OUTPATIENT
Start: 2023-02-24

## 2023-02-27 ENCOUNTER — OFFICE VISIT (OUTPATIENT)
Dept: ORTHOPEDIC SURGERY | Age: 63
End: 2023-02-27
Payer: COMMERCIAL

## 2023-02-27 VITALS — WEIGHT: 228 LBS | HEIGHT: 70 IN | BODY MASS INDEX: 32.64 KG/M2

## 2023-02-27 DIAGNOSIS — Z98.890 STATUS POST SURGERY: Primary | ICD-10-CM

## 2023-02-27 PROCEDURE — 99213 OFFICE O/P EST LOW 20 MIN: CPT | Performed by: ORTHOPAEDIC SURGERY

## 2023-03-04 LAB
APPEARANCE UR: CLEAR
BACTERIA #/AREA URNS HPF: NORMAL /[HPF]
BASOPHILS # BLD AUTO: 0 X10E3/UL (ref 0–0.2)
BASOPHILS NFR BLD AUTO: 1 %
BILIRUB UR QL STRIP: NEGATIVE
BUN SERPL-MCNC: 14 MG/DL (ref 8–27)
BUN/CREAT SERPL: 15 (ref 10–24)
CALCIUM SERPL-MCNC: 9.3 MG/DL (ref 8.6–10.2)
CASTS URNS QL MICRO: NORMAL /LPF
CHLORIDE SERPL-SCNC: 99 MMOL/L (ref 96–106)
CO2 SERPL-SCNC: 25 MMOL/L (ref 20–29)
COLOR UR: YELLOW
CREAT SERPL-MCNC: 0.93 MG/DL (ref 0.76–1.27)
EGFRCR SERPLBLD CKD-EPI 2021: 94 ML/MIN/1.73
EOSINOPHIL # BLD AUTO: 0.2 X10E3/UL (ref 0–0.4)
EOSINOPHIL NFR BLD AUTO: 5 %
EPI CELLS #/AREA URNS HPF: NORMAL /HPF (ref 0–10)
ERYTHROCYTE [DISTWIDTH] IN BLOOD BY AUTOMATED COUNT: 13.6 % (ref 11.6–15.4)
EST. AVERAGE GLUCOSE BLD GHB EST-MCNC: 126 MG/DL
GLUCOSE SERPL-MCNC: 103 MG/DL (ref 70–99)
GLUCOSE UR QL STRIP: NEGATIVE
HBA1C MFR BLD: 6 % (ref 4.8–5.6)
HCT VFR BLD AUTO: 43.3 % (ref 37.5–51)
HGB BLD-MCNC: 14.9 G/DL (ref 13–17.7)
HGB UR QL STRIP: NEGATIVE
IMM GRANULOCYTES # BLD AUTO: 0 X10E3/UL (ref 0–0.1)
IMM GRANULOCYTES NFR BLD AUTO: 0 %
KETONES UR QL STRIP: NEGATIVE
LEUKOCYTE ESTERASE UR QL STRIP: NEGATIVE
LYMPHOCYTES # BLD AUTO: 1.4 X10E3/UL (ref 0.7–3.1)
LYMPHOCYTES NFR BLD AUTO: 35 %
MCH RBC QN AUTO: 31.7 PG (ref 26.6–33)
MCHC RBC AUTO-ENTMCNC: 34.4 G/DL (ref 31.5–35.7)
MCV RBC AUTO: 92 FL (ref 79–97)
MICRO URNS: NORMAL
MICRO URNS: NORMAL
MONOCYTES # BLD AUTO: 0.4 X10E3/UL (ref 0.1–0.9)
MONOCYTES NFR BLD AUTO: 10 %
NEUTROPHILS # BLD AUTO: 2.1 X10E3/UL (ref 1.4–7)
NEUTROPHILS NFR BLD AUTO: 49 %
NITRITE UR QL STRIP: NEGATIVE
PH UR STRIP: 6 [PH] (ref 5–7.5)
PLATELET # BLD AUTO: 180 X10E3/UL (ref 150–450)
POTASSIUM SERPL-SCNC: 4.6 MMOL/L (ref 3.5–5.2)
PROT UR QL STRIP: NORMAL
RBC # BLD AUTO: 4.7 X10E6/UL (ref 4.14–5.8)
RBC #/AREA URNS HPF: NORMAL /HPF (ref 0–2)
SODIUM SERPL-SCNC: 138 MMOL/L (ref 134–144)
SP GR UR STRIP: 1.02 (ref 1–1.03)
URINALYSIS REFLEX, 377202: NORMAL
UROBILINOGEN UR STRIP-MCNC: 0.2 MG/DL (ref 0.2–1)
WBC # BLD AUTO: 4.2 X10E3/UL (ref 3.4–10.8)
WBC #/AREA URNS HPF: NORMAL /HPF (ref 0–5)

## 2023-03-05 NOTE — PROGRESS NOTES
A1c is slightly higher. Still a pre DM.  Work on low carb and less sugar intake   All other labs stable

## 2023-03-06 DIAGNOSIS — K21.9 GASTROESOPHAGEAL REFLUX DISEASE WITHOUT ESOPHAGITIS: ICD-10-CM

## 2023-03-06 DIAGNOSIS — I10 ESSENTIAL HYPERTENSION, BENIGN: Primary | ICD-10-CM

## 2023-03-06 DIAGNOSIS — F51.01 PRIMARY INSOMNIA: ICD-10-CM

## 2023-03-06 RX ORDER — OMEPRAZOLE 20 MG/1
CAPSULE, DELAYED RELEASE ORAL
COMMUNITY
Start: 2023-01-09 | End: 2023-03-06 | Stop reason: SDUPTHER

## 2023-03-06 RX ORDER — OMEPRAZOLE 20 MG/1
CAPSULE, DELAYED RELEASE ORAL
Qty: 90 CAPSULE | Refills: 1 | Status: SHIPPED | OUTPATIENT
Start: 2023-03-06

## 2023-03-06 RX ORDER — ZOLPIDEM TARTRATE 12.5 MG/1
TABLET, FILM COATED, EXTENDED RELEASE ORAL
Qty: 15 TABLET | Refills: 0 | Status: SHIPPED | OUTPATIENT
Start: 2023-03-06

## 2023-03-20 RX ORDER — ATORVASTATIN CALCIUM 20 MG/1
TABLET, FILM COATED ORAL
Qty: 90 TABLET | Refills: 0 | Status: SHIPPED | OUTPATIENT
Start: 2023-03-20

## 2023-03-30 DIAGNOSIS — I10 ESSENTIAL HYPERTENSION, BENIGN: ICD-10-CM

## 2023-03-30 RX ORDER — VALSARTAN AND HYDROCHLOROTHIAZIDE 160; 12.5 MG/1; MG/1
TABLET, FILM COATED ORAL
Qty: 30 TABLET | Refills: 0 | Status: SHIPPED | OUTPATIENT
Start: 2023-03-30

## 2023-04-04 ENCOUNTER — OFFICE VISIT (OUTPATIENT)
Dept: URGENT CARE | Age: 63
End: 2023-04-04

## 2023-04-04 NOTE — PROGRESS NOTES
Nasal Congestion  This is a chronic problem. The current episode started more than 1 week ago (2 months). The problem occurs constantly. The problem has not changed since onset. Pertinent negatives include no headaches and no shortness of breath. Associated symptoms comments: Stuffy nose and c/o PND with throat  clearing frequently   Mild cough following tickles   No sinus pressure    Not able to breath through left nostril   . Nothing aggravates the symptoms. Nothing relieves the symptoms. He has tried nothing for the symptoms. Past Medical History:   Diagnosis Date    CAD (coronary artery disease) 2016    mild; calcium scoring score=46    GERD (gastroesophageal reflux disease)     Hypertension     Mixed hyperlipidemia     LDL goal <70    Prediabetes 09/2016    Rectal polyp 11/10/14    Rotator cuff tear, left     Dr. Kelly Connell    Sleep disturbance         Past Surgical History:   Procedure Laterality Date    HX COLONOSCOPY  11/10/14    +rectal polyp (hyperplastic), Dr. Atul Ponce KNEE ARTHROSCOPY Right 2006    HX MENISCUS REPAIR Right 06/21/2016    Dr. Frannie Ansari MOHS PROCEDURES Right 2008    HX ROTATOR CUFF REPAIR  09/2016    Dr. Kelly Connell         Family History   Problem Relation Age of Onset    Heart Disease Father         CAD, age 72    Diabetes Father     Cancer Father         Lung  cancer from smoking    Cancer Paternal Uncle         lung - smoker        Social History     Socioeconomic History    Marital status: SINGLE     Spouse name: single    Number of children: 2    Years of education: Not on file    Highest education level: Not on file   Occupational History    Occupation: [employer]--hourly    Tobacco Use    Smoking status: Never    Smokeless tobacco: Never   Vaping Use    Vaping Use: Never used   Substance and Sexual Activity    Alcohol use: Yes     Alcohol/week: 3.0 - 5.0 standard drinks     Types: 3 - 5 Standard drinks or equivalent per week     Comment: social    Drug use:  No Sexual activity: Yes     Partners: Female     Comment: single,2 daughters,working in [employer]   Other Topics Concern    Not on file   Social History Narrative    Not on file     Social Determinants of Health     Financial Resource Strain: Low Risk     Difficulty of Paying Living Expenses: Not hard at all   Food Insecurity: No Food Insecurity    Worried About Running Out of Food in the Last Year: Never true    Ran Out of Food in the Last Year: Never true   Transportation Needs: Not on file   Physical Activity: Not on file   Stress: Not on file   Social Connections: Not on file   Intimate Partner Violence: Not on file   Housing Stability: Not on file                ALLERGIES: Bee sting [sting, bee]; Amlodipine; Demerol [meperidine]; and Lisinopril    Review of Systems   HENT:  Positive for congestion, postnasal drip and rhinorrhea. Negative for sinus pressure, sinus pain, sneezing and sore throat. Respiratory:  Negative for chest tightness and shortness of breath. Neurological:  Negative for headaches. All other systems reviewed and are negative. Vitals:    04/04/23 1514   BP: (!) 187/104   Pulse: 73   Resp: 18   Temp: 99.2 °F (37.3 °C)   SpO2: 96%   Weight: 224 lb (101.6 kg)   Height: 5' 10\" (1.778 m)       Physical Exam  Vitals and nursing note reviewed. Constitutional:       General: He is not in acute distress. HENT:      Right Ear: Tympanic membrane and ear canal normal.      Left Ear: Tympanic membrane and ear canal normal.      Nose: Nose normal.      Mouth/Throat:      Pharynx: No oropharyngeal exudate or posterior oropharyngeal erythema. Eyes:      General:         Right eye: No discharge. Left eye: No discharge. Conjunctiva/sclera: Conjunctivae normal.   Pulmonary:      Effort: Pulmonary effort is normal. No respiratory distress. Breath sounds: Normal breath sounds. No wheezing, rhonchi or rales. Musculoskeletal:      Cervical back: Neck supple.    Lymphadenopathy: Cervical: No cervical adenopathy. Skin:     Findings: No rash. MDM    Procedures        ICD-10-CM ICD-9-CM    1. Post-nasal drainage  R09.82 473.9         Start Allegra 180 mg once a day     Continue Sudafed  Use Flonase with saline rinse    If sxs not resolved - resolved partially , try Omeprazole 40 mg once a day     No orders of the defined types were placed in this encounter. No results found for any visits on 04/04/23. The patients condition was discussed with the patient and they understand. The patient is to follow up with primary care doctor. If signs and symptoms become worse the pt is to go to the ER. The patient is to take medications as prescribed.

## 2023-04-04 NOTE — PATIENT INSTRUCTIONS
Start Allegra 180 mg once a day     Continue Sudafed  Use Flonase with saline rinse    If sxs not resolved - resolved partially , try Omeprazole 40 mg once a day

## 2023-04-17 DIAGNOSIS — F51.01 PRIMARY INSOMNIA: ICD-10-CM

## 2023-04-17 RX ORDER — ZOLPIDEM TARTRATE 12.5 MG/1
TABLET, FILM COATED, EXTENDED RELEASE ORAL
Qty: 15 TABLET | Refills: 0 | Status: SHIPPED | OUTPATIENT
Start: 2023-04-17

## 2023-04-17 NOTE — TELEPHONE ENCOUNTER
From: [facility]. To: Office of Reid Waller NP  Sent: 4/17/2023 12:39 PM EDT  Subject: Medication Renewal Request    Refills have been requested for the following medications:     zolpidem CR (AMBIEN CR) 12.5 mg tablet [Jackie Rivera]   Patient Comment: Last prescription refill was 3/6/23. I have two tablets left. Only using them when I work, I have a routine check scheduled for May 25.     Preferred pharmacy: [address]

## 2023-05-24 ENCOUNTER — OFFICE VISIT (OUTPATIENT)
Age: 63
End: 2023-05-24
Payer: COMMERCIAL

## 2023-05-24 VITALS
HEART RATE: 76 BPM | HEIGHT: 70 IN | RESPIRATION RATE: 16 BRPM | BODY MASS INDEX: 32.24 KG/M2 | DIASTOLIC BLOOD PRESSURE: 76 MMHG | SYSTOLIC BLOOD PRESSURE: 146 MMHG | WEIGHT: 225.2 LBS | OXYGEN SATURATION: 97 % | TEMPERATURE: 98.5 F

## 2023-05-24 DIAGNOSIS — E78.2 MIXED HYPERLIPIDEMIA: ICD-10-CM

## 2023-05-24 DIAGNOSIS — F51.01 PRIMARY INSOMNIA: ICD-10-CM

## 2023-05-24 DIAGNOSIS — I10 ESSENTIAL (PRIMARY) HYPERTENSION: Primary | ICD-10-CM

## 2023-05-24 DIAGNOSIS — R73.03 PREDIABETES: ICD-10-CM

## 2023-05-24 DIAGNOSIS — J30.1 SEASONAL ALLERGIC RHINITIS DUE TO POLLEN: ICD-10-CM

## 2023-05-24 PROCEDURE — 3077F SYST BP >= 140 MM HG: CPT | Performed by: INTERNAL MEDICINE

## 2023-05-24 PROCEDURE — 3078F DIAST BP <80 MM HG: CPT | Performed by: INTERNAL MEDICINE

## 2023-05-24 PROCEDURE — 99214 OFFICE O/P EST MOD 30 MIN: CPT | Performed by: INTERNAL MEDICINE

## 2023-05-24 RX ORDER — ZOLPIDEM TARTRATE 12.5 MG/1
12.5 TABLET, FILM COATED, EXTENDED RELEASE ORAL NIGHTLY PRN
Qty: 30 TABLET | Refills: 1 | Status: SHIPPED | OUTPATIENT
Start: 2023-05-24 | End: 2023-06-23

## 2023-05-24 RX ORDER — VALSARTAN AND HYDROCHLOROTHIAZIDE 160; 12.5 MG/1; MG/1
1 TABLET, FILM COATED ORAL DAILY
Qty: 90 TABLET | Refills: 1 | Status: SHIPPED | OUTPATIENT
Start: 2023-05-24

## 2023-05-24 RX ORDER — ATORVASTATIN CALCIUM 20 MG/1
20 TABLET, FILM COATED ORAL DAILY
Qty: 90 TABLET | Refills: 1 | Status: SHIPPED | OUTPATIENT
Start: 2023-05-24

## 2023-05-24 SDOH — ECONOMIC STABILITY: FOOD INSECURITY: WITHIN THE PAST 12 MONTHS, THE FOOD YOU BOUGHT JUST DIDN'T LAST AND YOU DIDN'T HAVE MONEY TO GET MORE.: NEVER TRUE

## 2023-05-24 SDOH — ECONOMIC STABILITY: INCOME INSECURITY: HOW HARD IS IT FOR YOU TO PAY FOR THE VERY BASICS LIKE FOOD, HOUSING, MEDICAL CARE, AND HEATING?: NOT HARD AT ALL

## 2023-05-24 SDOH — ECONOMIC STABILITY: FOOD INSECURITY: WITHIN THE PAST 12 MONTHS, YOU WORRIED THAT YOUR FOOD WOULD RUN OUT BEFORE YOU GOT MONEY TO BUY MORE.: NEVER TRUE

## 2023-05-24 SDOH — ECONOMIC STABILITY: HOUSING INSECURITY
IN THE LAST 12 MONTHS, WAS THERE A TIME WHEN YOU DID NOT HAVE A STEADY PLACE TO SLEEP OR SLEPT IN A SHELTER (INCLUDING NOW)?: NO

## 2023-05-24 ASSESSMENT — PATIENT HEALTH QUESTIONNAIRE - PHQ9
SUM OF ALL RESPONSES TO PHQ QUESTIONS 1-9: 0
SUM OF ALL RESPONSES TO PHQ9 QUESTIONS 1 & 2: 0
1. LITTLE INTEREST OR PLEASURE IN DOING THINGS: 0
2. FEELING DOWN, DEPRESSED OR HOPELESS: 0
SUM OF ALL RESPONSES TO PHQ QUESTIONS 1-9: 0

## 2023-05-24 ASSESSMENT — ENCOUNTER SYMPTOMS
EYES NEGATIVE: 1
RESPIRATORY NEGATIVE: 1
GASTROINTESTINAL NEGATIVE: 1

## 2023-05-24 NOTE — PROGRESS NOTES
Subjective:      Patient ID: Lanre Jean. is a 63 y.o. male here for follow-up. He is doing well, report nasal congestion and postnasal drip whenever he goes to office. No cough or shortness of breath. Has hypertension, compliant medication. Denies chest pain palpitation or shortness of breath. Recent lab work done, CMP normal.  Had elevated lipid, on statin. Need lipid panel. He reports insomnia, has mild sleep apnea, trying to lose weight, not using CPAP machine. Use Ambien sometimes. Would like to get a refill. Had prediabetes, blood sugar running okay. Colonoscopy up-to-date. Hypertension    Gastroesophageal Reflux    Hyperglycemia  Associated symptoms include congestion. Review of Systems   Constitutional: Negative. HENT:  Positive for congestion and postnasal drip. Eyes: Negative. Respiratory: Negative. Cardiovascular: Negative. Gastrointestinal: Negative. Endocrine: Negative. Genitourinary: Negative. Musculoskeletal: Negative. Neurological: Negative. Hematological: Negative. Psychiatric/Behavioral:  Positive for sleep disturbance. Objective:   Physical Exam  Constitutional:       Appearance: Normal appearance. He is obese. HENT:      Right Ear: Tympanic membrane normal.      Left Ear: Tympanic membrane normal.      Nose: Congestion present. Comments: Nasal turbinates:red inflamed,NT    Cobble stoning present. Eyes:      Extraocular Movements: Extraocular movements intact. Conjunctiva/sclera: Conjunctivae normal.      Pupils: Pupils are equal, round, and reactive to light. Cardiovascular:      Rate and Rhythm: Normal rate and regular rhythm. Pulmonary:      Effort: Pulmonary effort is normal.      Breath sounds: Normal breath sounds. Abdominal:      General: Abdomen is flat. Bowel sounds are normal.      Palpations: Abdomen is soft. Musculoskeletal:         General: Normal range of motion.       Cervical back: Normal range

## 2023-05-25 LAB
CHOLEST SERPL-MCNC: 204 MG/DL (ref 100–199)
HDLC SERPL-MCNC: 29 MG/DL
IMP & REVIEW OF LAB RESULTS: NORMAL
LDLC SERPL CALC-MCNC: ABNORMAL MG/DL (ref 0–99)
TRIGL SERPL-MCNC: 1228 MG/DL (ref 0–149)
VLDLC SERPL CALC-MCNC: ABNORMAL MG/DL (ref 5–40)

## 2023-05-26 DIAGNOSIS — E78.2 MIXED HYPERLIPIDEMIA: Primary | ICD-10-CM

## 2023-05-26 DIAGNOSIS — Z79.899 ON STATIN THERAPY: ICD-10-CM

## 2023-05-26 RX ORDER — FENOFIBRATE 145 MG/1
145 TABLET, COATED ORAL DAILY
Qty: 90 TABLET | Refills: 1 | Status: SHIPPED | OUTPATIENT
Start: 2023-05-26

## 2023-05-26 NOTE — TELEPHONE ENCOUNTER
Requested Prescriptions     Pending Prescriptions Disp Refills    fenofibrate (TRICOR) 145 MG tablet 90 tablet 1     Sig: Take 1 tablet by mouth daily         [facility] [address] [facility] [place], [address] [phone]  [facility] [address]  Phone: [phone] Fax: [phone]       Last appt 5/24/2023      Future Appointments   Date Time Provider [facility]   8/28/2023  4:00 PM Holli Ly MD [facility] AMB   11/29/2023 11:30 AM Mathew Dhaliwal MD [facility] BS AMB

## 2023-05-26 NOTE — TELEPHONE ENCOUNTER
----- Message from Wilbur Barrera MD sent at 5/25/2023  6:07 PM EDT -----  Very high triglyceride. Please start him on Tricor 145 mg p.o. daily. Repeat AST ALT in a month. Be on low-carb diet and exercise.

## 2023-07-07 DIAGNOSIS — Z79.899 ON STATIN THERAPY: ICD-10-CM

## 2023-07-07 DIAGNOSIS — E78.2 MIXED HYPERLIPIDEMIA: ICD-10-CM

## 2023-07-13 LAB
ALT SERPL-CCNC: 32 IU/L (ref 0–44)
AST SERPL-CCNC: 29 IU/L (ref 0–40)

## 2023-08-22 ENCOUNTER — TELEMEDICINE (OUTPATIENT)
Age: 63
End: 2023-08-22
Payer: COMMERCIAL

## 2023-08-22 DIAGNOSIS — E78.2 ELEVATED CHOLESTEROL WITH HIGH TRIGLYCERIDES: ICD-10-CM

## 2023-08-22 DIAGNOSIS — N52.1 ERECTILE DISORDER DUE TO MEDICAL CONDITION IN MALE PATIENT: Primary | ICD-10-CM

## 2023-08-22 DIAGNOSIS — J30.1 SEASONAL ALLERGIC RHINITIS DUE TO POLLEN: ICD-10-CM

## 2023-08-22 DIAGNOSIS — I10 ESSENTIAL HYPERTENSION, BENIGN: ICD-10-CM

## 2023-08-22 PROCEDURE — 99214 OFFICE O/P EST MOD 30 MIN: CPT | Performed by: INTERNAL MEDICINE

## 2023-08-22 RX ORDER — FENOFIBRATE 145 MG/1
145 TABLET, COATED ORAL DAILY
Qty: 90 TABLET | Refills: 1 | Status: SHIPPED | OUTPATIENT
Start: 2023-08-22

## 2023-08-22 RX ORDER — ZOLPIDEM TARTRATE 12.5 MG/1
TABLET, FILM COATED, EXTENDED RELEASE ORAL
COMMUNITY
Start: 2023-07-27

## 2023-08-22 RX ORDER — TADALAFIL 10 MG/1
10 TABLET ORAL DAILY PRN
Qty: 30 TABLET | Refills: 1 | Status: CANCELLED | OUTPATIENT
Start: 2023-08-22

## 2023-08-22 RX ORDER — TADALAFIL 5 MG/1
TABLET ORAL
Qty: 20 TABLET | Refills: 2 | Status: SHIPPED | OUTPATIENT
Start: 2023-08-22

## 2023-08-22 ASSESSMENT — PATIENT HEALTH QUESTIONNAIRE - PHQ9
1. LITTLE INTEREST OR PLEASURE IN DOING THINGS: 0
SUM OF ALL RESPONSES TO PHQ QUESTIONS 1-9: 0
SUM OF ALL RESPONSES TO PHQ9 QUESTIONS 1 & 2: 0
2. FEELING DOWN, DEPRESSED OR HOPELESS: 0
SUM OF ALL RESPONSES TO PHQ QUESTIONS 1-9: 0

## 2023-08-22 NOTE — PROGRESS NOTES
hypertension, compliant medication. Denies chest pain palpitation shortness of breath. Report nasal congestion and postnasal drip. Has been taking allergy medication and Flonase, not helping him. He suffers from insomnia, taking Ambien as needed. Labs reviewed with him. HPI  Review of Systems       Objective   Patient-Reported Vitals  No data recorded     Physical Exam      Constitutional: [x] Appears well-developed and well-nourished [x] No apparent distress      [] Abnormal -     Mental status: [x] Alert and awake  [x] Oriented to person/place/time [x] Able to follow commands    [] Abnormal -     Eyes:   EOM    [x]  Normal    [] Abnormal -   Sclera  [x]  Normal    [] Abnormal -          Discharge [x]  None visible   [] Abnormal -     HENT: [x] Normocephalic, atraumatic  [] Abnormal -   [x] Mouth/Throat: Mucous membranes are moist    External Ears [x] Normal  [] Abnormal -    Neck: [x] No visualized mass [] Abnormal -     Pulmonary/Chest: [x] Respiratory effort normal   [x] No visualized signs of difficulty breathing or respiratory distress        [] Abnormal -      Musculoskeletal:   [x] Normal gait with no signs of ataxia         [x] Normal range of motion of neck        [] Abnormal -     Neurological:        [x] No Facial Asymmetry (Cranial nerve 7 motor function) (limited exam due to video visit)          [x] No gaze palsy        [] Abnormal -          Skin:        [x] No significant exanthematous lesions or discoloration noted on facial skin         [] Abnormal -            Psychiatric:       [x] Normal Affect [] Abnormal -        [x] No Hallucinations    Other pertinent observable physical exam findings:-         On this date 8/22/2023 I have spent 31   minutes reviewing previous notes, test results and face to face (virtual) with the patient discussing the diagnosis and importance of compliance with the treatment plan as well as documenting on the day of the visit.     Sincere Siegel, was

## 2023-10-06 DIAGNOSIS — F51.01 PRIMARY INSOMNIA: Primary | ICD-10-CM

## 2023-10-06 RX ORDER — ZOLPIDEM TARTRATE 12.5 MG/1
TABLET, FILM COATED, EXTENDED RELEASE ORAL
Qty: 30 TABLET | Refills: 2 | Status: SHIPPED | OUTPATIENT
Start: 2023-10-06 | End: 2023-11-05

## 2023-10-06 NOTE — TELEPHONE ENCOUNTER
Requested Prescriptions     Pending Prescriptions Disp Refills    zolpidem (AMBIEN CR) 12.5 MG extended release tablet 30 tablet 2     Sig: TAKE 1 TABLET BY MOUTH AT NIGHT AS NEEDED FOR SLEEP FOR UP TO 30 DAYS.  MAX DAILY AMOUNT 12.5MG         [facility] [address], [place] - [address] [place] [address]  Phone: [phone] Fax: [phone]       Last appt 8/22/2023      Future Appointments   Date Time Provider [address] [place] [address]   11/29/2023 11:30 AM Dariusz Dodd MD [facility] AMB

## 2023-10-11 DIAGNOSIS — F51.01 PRIMARY INSOMNIA: ICD-10-CM

## 2023-10-11 DIAGNOSIS — I10 ESSENTIAL (PRIMARY) HYPERTENSION: ICD-10-CM

## 2023-10-11 DIAGNOSIS — E78.2 MIXED HYPERLIPIDEMIA: ICD-10-CM

## 2023-10-11 RX ORDER — ATORVASTATIN CALCIUM 20 MG/1
20 TABLET, FILM COATED ORAL DAILY
Qty: 90 TABLET | Refills: 1 | Status: SHIPPED | OUTPATIENT
Start: 2023-10-11

## 2023-10-11 RX ORDER — ZOLPIDEM TARTRATE 12.5 MG/1
TABLET, FILM COATED, EXTENDED RELEASE ORAL
Qty: 30 TABLET | Refills: 2 | Status: SHIPPED | OUTPATIENT
Start: 2023-10-11 | End: 2023-11-10

## 2023-10-11 RX ORDER — VALSARTAN AND HYDROCHLOROTHIAZIDE 160; 12.5 MG/1; MG/1
1 TABLET, FILM COATED ORAL DAILY
Qty: 90 TABLET | Refills: 1 | Status: SHIPPED | OUTPATIENT
Start: 2023-10-11

## 2023-10-11 NOTE — TELEPHONE ENCOUNTER
Requested Prescriptions     Pending Prescriptions Disp Refills    atorvastatin (LIPITOR) 20 MG tablet 90 tablet 1     Sig: Take 1 tablet by mouth daily    valsartan-hydroCHLOROthiazide (DIOVAN-HCT) 160-12.5 MG per tablet 90 tablet 1     Sig: Take 1 tablet by mouth daily         [address] [place] [phone] Yakelin Sorto [phone]  [facility]  Phone: [phone] Fax: [phone]       Last appt 8/22/2023      Future Appointments   Date Time Provider [address] [place] [address]   11/29/2023 11:30 AM Tricia Zavala MD [facility] AMB

## 2023-10-11 NOTE — TELEPHONE ENCOUNTER
Requested Prescriptions     Pending Prescriptions Disp Refills    zolpidem (AMBIEN CR) 12.5 MG extended release tablet 30 tablet 2     Sig: TAKE 1 TABLET BY MOUTH AT NIGHT AS NEEDED FOR SLEEP FOR UP TO 30 DAYS.  MAX DAILY AMOUNT 12.5MG         [facility] [address], [address] [place] [address] [phone]  [place] [address]  Phone: [phone] Fax: [phone]       Last appt 8/22/2023      Future Appointments   Date Time Provider [address] [place] [address]   11/29/2023 11:30 AM Jayden Carballo MD [facility] AMB

## 2023-11-12 DIAGNOSIS — E78.2 MIXED HYPERLIPIDEMIA: ICD-10-CM

## 2023-11-13 RX ORDER — ATORVASTATIN CALCIUM 20 MG/1
20 TABLET, FILM COATED ORAL DAILY
Qty: 30 TABLET | Refills: 5 | Status: SHIPPED | OUTPATIENT
Start: 2023-11-13 | End: 2023-11-16 | Stop reason: SDUPTHER

## 2023-11-16 DIAGNOSIS — E78.2 MIXED HYPERLIPIDEMIA: ICD-10-CM

## 2023-11-16 DIAGNOSIS — I10 ESSENTIAL (PRIMARY) HYPERTENSION: ICD-10-CM

## 2023-11-16 RX ORDER — VALSARTAN AND HYDROCHLOROTHIAZIDE 160; 12.5 MG/1; MG/1
1 TABLET, FILM COATED ORAL DAILY
Qty: 90 TABLET | Refills: 1 | Status: SHIPPED | OUTPATIENT
Start: 2023-11-16

## 2023-11-16 RX ORDER — ATORVASTATIN CALCIUM 20 MG/1
20 TABLET, FILM COATED ORAL DAILY
Qty: 90 TABLET | Refills: 1 | Status: SHIPPED | OUTPATIENT
Start: 2023-11-16

## 2023-11-16 NOTE — TELEPHONE ENCOUNTER
Medications was sent to [facility]        LOV: 08/22/2023  NOV: 11/29/2023    Medication: atorvastatin (LIPITOR) 20 MG tablet    Quantity: 30    Medication: valsartan-hydroCHLOROthiazide (DIOVAN-HCT) 160-12.5 MG per tablet    Quantity: 90    Pharmacy: [facility] [address] [place] [address] [phone] F [phone]

## 2023-11-16 NOTE — TELEPHONE ENCOUNTER
Last appt 8/22/2023      Future Appointments   Date Time Provider [address]   11/29/2023 11:30 AM Karsten Duenas MD [facility] BS [address] [place], [address] [phone]  [place] [address]  Phone: [phone] Fax: [phone]

## 2023-11-29 ENCOUNTER — OFFICE VISIT (OUTPATIENT)
Age: 63
End: 2023-11-29
Payer: COMMERCIAL

## 2023-11-29 VITALS
SYSTOLIC BLOOD PRESSURE: 144 MMHG | HEART RATE: 61 BPM | RESPIRATION RATE: 16 BRPM | WEIGHT: 221.2 LBS | BODY MASS INDEX: 31.67 KG/M2 | HEIGHT: 70 IN | DIASTOLIC BLOOD PRESSURE: 96 MMHG | TEMPERATURE: 96.9 F | OXYGEN SATURATION: 97 %

## 2023-11-29 DIAGNOSIS — E78.2 ELEVATED CHOLESTEROL WITH HIGH TRIGLYCERIDES: Primary | ICD-10-CM

## 2023-11-29 DIAGNOSIS — I10 ESSENTIAL HYPERTENSION, BENIGN: ICD-10-CM

## 2023-11-29 DIAGNOSIS — N52.1 ERECTILE DISORDER DUE TO MEDICAL CONDITION IN MALE PATIENT: ICD-10-CM

## 2023-11-29 DIAGNOSIS — G47.00 INSOMNIA, UNSPECIFIED TYPE: ICD-10-CM

## 2023-11-29 DIAGNOSIS — R73.03 PREDIABETES: ICD-10-CM

## 2023-11-29 DIAGNOSIS — K21.9 GASTROESOPHAGEAL REFLUX DISEASE WITHOUT ESOPHAGITIS: ICD-10-CM

## 2023-11-29 DIAGNOSIS — Z12.5 SCREENING FOR PROSTATE CANCER: ICD-10-CM

## 2023-11-29 PROCEDURE — 3080F DIAST BP >= 90 MM HG: CPT | Performed by: INTERNAL MEDICINE

## 2023-11-29 PROCEDURE — 3077F SYST BP >= 140 MM HG: CPT | Performed by: INTERNAL MEDICINE

## 2023-11-29 PROCEDURE — 99214 OFFICE O/P EST MOD 30 MIN: CPT | Performed by: INTERNAL MEDICINE

## 2023-11-29 RX ORDER — OMEPRAZOLE 20 MG/1
20 CAPSULE, DELAYED RELEASE ORAL EVERY MORNING
Qty: 90 CAPSULE | Refills: 1 | Status: SHIPPED | OUTPATIENT
Start: 2023-11-29

## 2023-11-29 RX ORDER — FENOFIBRATE 145 MG/1
145 TABLET, COATED ORAL DAILY
Qty: 90 TABLET | Refills: 1 | Status: SHIPPED | OUTPATIENT
Start: 2023-11-29

## 2023-11-29 RX ORDER — TADALAFIL 5 MG/1
TABLET ORAL
Qty: 20 TABLET | Refills: 2 | Status: SHIPPED | OUTPATIENT
Start: 2023-11-29

## 2023-11-29 ASSESSMENT — ENCOUNTER SYMPTOMS
GASTROINTESTINAL NEGATIVE: 1
EYES NEGATIVE: 1
RESPIRATORY NEGATIVE: 1

## 2023-11-29 ASSESSMENT — PATIENT HEALTH QUESTIONNAIRE - PHQ9
1. LITTLE INTEREST OR PLEASURE IN DOING THINGS: 0
SUM OF ALL RESPONSES TO PHQ QUESTIONS 1-9: 0
SUM OF ALL RESPONSES TO PHQ QUESTIONS 1-9: 0
SUM OF ALL RESPONSES TO PHQ9 QUESTIONS 1 & 2: 0
SUM OF ALL RESPONSES TO PHQ QUESTIONS 1-9: 0
2. FEELING DOWN, DEPRESSED OR HOPELESS: 0
SUM OF ALL RESPONSES TO PHQ QUESTIONS 1-9: 0

## 2023-11-29 NOTE — PROGRESS NOTES
Subjective:      Patient ID: Renny Kebede. is a 66 y.o. male here for follow-up. Has hypertension, compliant medication. Denies chest pain palpitation shortness of breath. Has elevated triglyceride, watching diet. Need to repeat lab work. Have erectile dysfunction, Cialis is working great. Would like to get a refill. Has hyperlipidemia, on statin, no myalgia. Colonoscopy is up-to-date. He suffer from insomnia, using Ambien CR, he has more than 15 tablets for now. He is only using his half tablet every 3 to 5 days as needed. No need for any medication right now. Hypertension    Gastroesophageal Reflux    Review of Systems   Constitutional: Negative. HENT: Negative. Eyes: Negative. Respiratory: Negative. Cardiovascular: Negative. Gastrointestinal: Negative. Endocrine: Negative. Genitourinary: Negative. Musculoskeletal: Negative. Skin: Negative. Neurological: Negative. Hematological: Negative. Psychiatric/Behavioral: Negative. Objective:   Physical Exam  Constitutional:       Appearance: Normal appearance. He is obese. Cardiovascular:      Rate and Rhythm: Normal rate and regular rhythm. Pulses: Normal pulses. Heart sounds: Normal heart sounds. Pulmonary:      Effort: Pulmonary effort is normal.      Breath sounds: Normal breath sounds. Abdominal:      General: Abdomen is flat. Bowel sounds are normal.      Palpations: Abdomen is soft. Musculoskeletal:         General: Normal range of motion. Cervical back: Normal range of motion and neck supple. Skin:     General: Skin is warm. Neurological:      General: No focal deficit present. Mental Status: He is alert and oriented to person, place, and time. Mental status is at baseline. Psychiatric:         Mood and Affect: Mood normal.         Behavior: Behavior normal.         Thought Content:  Thought content normal.     Assessment / Plan:      Diagnoses and all orders for this

## 2023-12-15 LAB — HBA1C MFR BLD: 6 % (ref 4.8–5.6)

## 2023-12-19 DIAGNOSIS — R73.9 BLOOD GLUCOSE ELEVATED: ICD-10-CM

## 2024-03-27 ENCOUNTER — OFFICE VISIT (OUTPATIENT)
Age: 64
End: 2024-03-27
Payer: COMMERCIAL

## 2024-03-27 VITALS
OXYGEN SATURATION: 98 % | SYSTOLIC BLOOD PRESSURE: 160 MMHG | BODY MASS INDEX: 31.14 KG/M2 | WEIGHT: 217 LBS | RESPIRATION RATE: 18 BRPM | HEART RATE: 92 BPM | DIASTOLIC BLOOD PRESSURE: 80 MMHG

## 2024-03-27 DIAGNOSIS — F51.01 PRIMARY INSOMNIA: ICD-10-CM

## 2024-03-27 DIAGNOSIS — I10 ESSENTIAL (PRIMARY) HYPERTENSION: ICD-10-CM

## 2024-03-27 DIAGNOSIS — R73.03 PREDIABETES: ICD-10-CM

## 2024-03-27 DIAGNOSIS — R73.03 PREDIABETES: Primary | ICD-10-CM

## 2024-03-27 DIAGNOSIS — Z12.5 PROSTATE CANCER SCREENING: ICD-10-CM

## 2024-03-27 DIAGNOSIS — E78.2 ELEVATED CHOLESTEROL WITH HIGH TRIGLYCERIDES: ICD-10-CM

## 2024-03-27 DIAGNOSIS — E78.2 MIXED HYPERLIPIDEMIA: ICD-10-CM

## 2024-03-27 DIAGNOSIS — Z91.030 BEE STING ALLERGY: ICD-10-CM

## 2024-03-27 DIAGNOSIS — M17.11 PRIMARY OSTEOARTHRITIS OF RIGHT KNEE: ICD-10-CM

## 2024-03-27 PROCEDURE — 99214 OFFICE O/P EST MOD 30 MIN: CPT | Performed by: INTERNAL MEDICINE

## 2024-03-27 PROCEDURE — 3077F SYST BP >= 140 MM HG: CPT | Performed by: INTERNAL MEDICINE

## 2024-03-27 PROCEDURE — 3079F DIAST BP 80-89 MM HG: CPT | Performed by: INTERNAL MEDICINE

## 2024-03-27 RX ORDER — DIAZEPAM 5 MG/1
TABLET ORAL
COMMUNITY
Start: 2023-12-19 | End: 2024-03-27 | Stop reason: ALTCHOICE

## 2024-03-27 RX ORDER — ATORVASTATIN CALCIUM 20 MG/1
20 TABLET, FILM COATED ORAL DAILY
Qty: 90 TABLET | Refills: 1 | Status: SHIPPED | OUTPATIENT
Start: 2024-03-27

## 2024-03-27 RX ORDER — VALSARTAN AND HYDROCHLOROTHIAZIDE 160; 12.5 MG/1; MG/1
1 TABLET, FILM COATED ORAL DAILY
Qty: 90 TABLET | Refills: 1 | Status: SHIPPED | OUTPATIENT
Start: 2024-03-27

## 2024-03-27 RX ORDER — EPINEPHRINE 0.3 MG/.3ML
0.3 INJECTION SUBCUTANEOUS ONCE
Qty: 0.3 ML | Refills: 0 | Status: SHIPPED | OUTPATIENT
Start: 2024-03-27 | End: 2024-03-27

## 2024-03-27 RX ORDER — ZOLPIDEM TARTRATE 12.5 MG/1
TABLET, FILM COATED, EXTENDED RELEASE ORAL
COMMUNITY
Start: 2024-01-12

## 2024-03-27 RX ORDER — AMOXICILLIN AND CLAVULANATE POTASSIUM 875; 125 MG/1; MG/1
1 TABLET, FILM COATED ORAL EVERY 12 HOURS
COMMUNITY
Start: 2024-02-23 | End: 2024-03-27 | Stop reason: ALTCHOICE

## 2024-03-27 ASSESSMENT — ENCOUNTER SYMPTOMS
GASTROINTESTINAL NEGATIVE: 1
EYES NEGATIVE: 1
RESPIRATORY NEGATIVE: 1

## 2024-03-27 ASSESSMENT — PATIENT HEALTH QUESTIONNAIRE - PHQ9
2. FEELING DOWN, DEPRESSED OR HOPELESS: NOT AT ALL
1. LITTLE INTEREST OR PLEASURE IN DOING THINGS: NOT AT ALL
SUM OF ALL RESPONSES TO PHQ QUESTIONS 1-9: 0
SUM OF ALL RESPONSES TO PHQ9 QUESTIONS 1 & 2: 0

## 2024-03-27 NOTE — PROGRESS NOTES
\"Have you been to the ER, urgent care clinic since your last visit?  Hospitalized since your last visit?\"    NO    “Have you seen or consulted any other health care providers outside of [facility] since your last visit?”    YES - When: approximately 02/08/2024 ago.  Where and Why: eye lid surgery.            Click Here for Release of Records Request

## 2024-03-27 NOTE — PROGRESS NOTES
Subjective:      Patient ID: Reji Paul Jr. is a 63 y.o. male is here for follow-up.  He is doing well.  Has hypertension, compliant medication.  Denies chest pain palpitation shortness of breath.  Has elevated lipid, taking statin.  His triglyceride level was very high.  He has changed his lifestyle and eating habits.  He would like to repeat lab work to decide whether he needs an fenofibrate or not.  Currently he is not taking it.  Had history of anaphylactic reaction with bee sting, would like to get refill on EpiPen.  He is obese, watching diet and have lost weight.  Suffer from insomnia, taking Ambien 2-3 times a week.  He just have refilled his last prescription.  Has a right knee arthritis.  Seen by orthopedic.  Has had advanced arthritis.  He is not planning to do any knee replacement.  He is trying to lose weight.  HPI    Review of Systems   Constitutional: Negative.    HENT: Negative.     Eyes: Negative.    Respiratory: Negative.     Cardiovascular: Negative.    Gastrointestinal: Negative.    Endocrine: Negative.    Genitourinary: Negative.    Musculoskeletal: Negative.    Neurological: Negative.    Hematological: Negative.    Psychiatric/Behavioral: Negative.         Objective:   Physical Exam  Constitutional:       Appearance: Normal appearance. He is obese.   Cardiovascular:      Rate and Rhythm: Normal rate and regular rhythm.      Pulses: Normal pulses.      Heart sounds: Normal heart sounds.   Pulmonary:      Effort: Pulmonary effort is normal.      Breath sounds: Normal breath sounds.   Abdominal:      General: Abdomen is flat. Bowel sounds are normal.      Palpations: Abdomen is soft.   Musculoskeletal:         General: Normal range of motion.      Cervical back: Normal range of motion and neck supple.   Skin:     General: Skin is warm.   Neurological:      General: No focal deficit present.      Mental Status: He is alert and oriented to person, place, and time. Mental status is at baseline.

## 2024-03-30 LAB
ALBUMIN SERPL-MCNC: 4.6 G/DL (ref 3.9–4.9)
ALBUMIN/GLOB SERPL: 1.8 {RATIO} (ref 1.2–2.2)
ALP SERPL-CCNC: 63 IU/L (ref 44–121)
ALT SERPL-CCNC: 28 IU/L (ref 0–44)
AST SERPL-CCNC: 24 IU/L (ref 0–40)
BILIRUB SERPL-MCNC: 0.5 MG/DL (ref 0–1.2)
BUN SERPL-MCNC: 18 MG/DL (ref 8–27)
BUN/CREAT SERPL: 20 (ref 10–24)
CALCIUM SERPL-MCNC: 9.7 MG/DL (ref 8.6–10.2)
CHLORIDE SERPL-SCNC: 99 MMOL/L (ref 96–106)
CHOLEST SERPL-MCNC: 156 MG/DL (ref 100–199)
CO2 SERPL-SCNC: 27 MMOL/L (ref 20–29)
CREAT SERPL-MCNC: 0.88 MG/DL (ref 0.76–1.27)
EGFRCR SERPLBLD CKD-EPI 2021: 98 ML/MIN/1.73
GLOBULIN SER CALC-MCNC: 2.5 G/DL (ref 1.5–4.5)
GLUCOSE SERPL-MCNC: 99 MG/DL (ref 70–99)
HBA1C MFR BLD: 5.8 % (ref 4.8–5.6)
HDLC SERPL-MCNC: 42 MG/DL
LDLC SERPL CALC-MCNC: 86 MG/DL (ref 0–99)
POTASSIUM SERPL-SCNC: 4.9 MMOL/L (ref 3.5–5.2)
PROT SERPL-MCNC: 7.1 G/DL (ref 6–8.5)
PSA SERPL-MCNC: 3.2 NG/ML (ref 0–4)
SODIUM SERPL-SCNC: 140 MMOL/L (ref 134–144)
TRIGL SERPL-MCNC: 160 MG/DL (ref 0–149)
VLDLC SERPL CALC-MCNC: 28 MG/DL (ref 5–40)

## 2024-04-01 LAB — IMP & REVIEW OF LAB RESULTS: NORMAL

## 2024-05-13 NOTE — PROGRESS NOTES
OFFICE VISIT 5/13/24  8:40 AM CDT    History of Present Illness    Nithin presents today for follow-up for recent left shoulder fracture and accompanying pain and discomfort.    The patient reports a fall which caused a diagnosed left shoulder fracture. He experiences pain when wearing a sling but reports no pain and movement ability when the sling is removed. It appears the fracture is displaced, which may extend the healing period. He is currently unable to lift anything with the affected arm.    Nithin's blood sugars often run around 250. He is currently using Lantus insulin, NovoLog, and Trulicity (once a week), [facility] He notes that blood sugars below 100 may lead to feelings of shaking. On the day of consultation, he has not eaten any meals.    Nithin reports satisfactory blood pressure control on current medication, denying any experiences of angina or heart-related pain.    Concerns arise from family members about the patient living alone. His daughter and sister aim to arrange [facility] a [facility] once a slot is available.       Review of Systems   Respiratory:  Negative for chest tightness and shortness of breath.    Cardiovascular:  Negative for chest pain.   Endocrine: Negative for polydipsia and polyuria.     Assessment & Plan     Referred the patient to an orthopedic specialist for further evaluation of the displaced shoulder fracture.   Ordered a repeat x-ray of the left shoulder to assess the alignment of the fractured bone.   Advised the patient to avoid lifting anything with the injured shoulder and to wear a sling until the orthopedic consultation.   Educated the patient about the nature of the shoulder fracture, explaining that it is displaced and may require a longer healing period.   Expressed concern about the patient's elevated blood sugar.   Ordered labs to monitor the patient's diabetes control.   Suggested a potential consultation with a diabetes specialist if the  PT DAILY TREATMENT NOTE    Patient Name: Meme Castillo. Date:2022  : 1960  [x]  Patient  Verified  Payor: BLUE CROSS / Plan: Diamond Roberson [id number] PPO / Product Type: PPO /    Total Treatment Time (min): 65+  Referring Provider: Yamsani Dexter MD    1. Postoperative pain of right knee  2. Difficulty walking  3. S/P right unicompartmental knee replacement    SUBJECTIVE  Subjective functional status/changes:   [] No changes reported    Patient reports knee has been feeling better as swelling and dermatitis have both improved. OBJECTIVE/TREATMENT    Manual Therapy x 20 mins:   PF/TF mobilizations to affected limb to promote improved joint mobility. PROM for knee flexion and extension mobility. STM/MFR to the distal IT band, quadriceps, peripatellar structures and popliteal musculature. Passive quad and hamstring stretching in supine and with leg off table. Neuromuscular Re-education (NMR) x 10 minutes:  []  Kinesiotaping applied in a peripatellar \"Y\" pattern to promote lymphatic drainage. []  Neuromuscular reeducation to the VMO with use of [place] electrical stimulation in conjunction with active contraction and exercises. [x]  balance/proprioceptive exercises and activities in clinic as listed below as NMR. Therapeutic Exercise x 30 mins:   Strengthening/Endurance/ADL function/Neuromuscular reeducation activities/exercises supervised and completed as listed below.       EXERCISE X= completed on  2022   slantboard x   SAQ/LAQ    TKE grn   Balance board *NMR x   Extension prop 7#   TG press Lv 16   SLS on foam *NMR x   Lat steps on foam x   Leg press B 120#, U 60#   ABCs w/ball *NMR x                                           Added/Changed Exercises:  [x]  Advanced to address: [x] functional strength/ROM deficits [x] balance/proprioceptive tasks  []  Modified: [] per subjective reports [] for patient time constraints [] for clinic time constraints    Modality:  [] E-Stim: type _ x _ min     []att   []unatt   []w/ice   []w/heat  []  Ultrasound: []cont   []pulse    _ W/cm2 x _  min   []1MHz   []3MHz  [x]  Ice pack: post      []  Hot pack: pre    []  Other:     Patient Education: [] Review HEP    [] Progressed/Changed HEP to include:  [] positioning   [] body mechanics   [] transfers   [] heat/ice application      ASSESSMENT    Still with clear quad weakness contributing to extension lag and difficulty with eccentric control. Joint effusion present.      Progress towards goals / Updated goals:    PLAN  [x]  Upgrade activities as tolerated      [x]  Continue plan of care  []  Discharge due to:  []  Other:      Co-treatment by Ana María Luevano PTA 11/2/2022 patient's blood sugar do not improve.   Discussed the importance of managing high blood sugar with the patient.       1. Closed anterior displaced fracture of sternal end of left clavicle, initial encounter  -     Ambulatory referral/consult to Orthopedics; Future; Expected date: 05/20/2024  -     X-Ray Clavicle Left; Future; Expected date: 05/13/2024    2. Type 2 diabetes mellitus with hyperglycemia, with long-term current use of insulin  -     Hemoglobin A1C; Future; Expected date: 05/13/2024    3. Hypertension associated with diabetes  -     amLODIPine (NORVASC) 10 MG tablet; Take 0.5 tablets (5 mg total) by mouth once daily.  Dispense: 90 tablet; Refill: 3  -     losartan (COZAAR) 100 MG tablet; Take 1 tablet (100 mg total) by mouth once daily.  Dispense: 90 tablet; Refill: 3  -     metoprolol succinate (TOPROL-XL) 25 MG 24 hr tablet; Take 1 tablet (25 mg total) by mouth every evening.  Dispense: 90 tablet; Refill: 3    4. Coronary artery disease of bypass graft of native heart with stable angina pectoris  Overview:  Per patient s/p CABG x4 5/2011    Orders:  -     amLODIPine (NORVASC) 10 MG tablet; Take 0.5 tablets (5 mg total) by mouth once daily.  Dispense: 90 tablet; Refill: 3  -     atorvastatin (LIPITOR) 40 MG tablet; Take 1 tablet (40 mg total) by mouth every evening.  Dispense: 90 tablet; Refill: 3  -     isosorbide mononitrate (IMDUR) 60 MG 24 hr tablet; Take 1 tablet (60 mg total) by mouth once daily.  Dispense: 90 tablet; Refill: 3  -     metoprolol succinate (TOPROL-XL) 25 MG 24 hr tablet; Take 1 tablet (25 mg total) by mouth every evening.  Dispense: 90 tablet; Refill: 3    5. Thoracic aorta atherosclerosis  Overview:  CXR 2///11- TOP NORMAL HEART SIZE WITH MILD TORTUOSITY AND   ATHEROSCLEROTIC CALCIFICATION OF THE THORACIC AORTA.    Orders:  -     atorvastatin (LIPITOR) 40 MG tablet; Take 1 tablet (40 mg total) by mouth every evening.  Dispense: 90 tablet; Refill: 3    6. Hyperlipidemia associated  "with type 2 diabetes mellitus  -     atorvastatin (LIPITOR) 40 MG tablet; Take 1 tablet (40 mg total) by mouth every evening.  Dispense: 90 tablet; Refill: 3    7. Asymptomatic stenosis of both carotid arteries without infarction  -     atorvastatin (LIPITOR) 40 MG tablet; Take 1 tablet (40 mg total) by mouth every evening.  Dispense: 90 tablet; Refill: 3    8. Diabetes mellitus type 2 with peripheral artery disease  -     atorvastatin (LIPITOR) 40 MG tablet; Take 1 tablet (40 mg total) by mouth every evening.  Dispense: 90 tablet; Refill: 3    9. Chronic combined systolic and diastolic congestive heart failure  -     losartan (COZAAR) 100 MG tablet; Take 1 tablet (100 mg total) by mouth once daily.  Dispense: 90 tablet; Refill: 3  -     metoprolol succinate (TOPROL-XL) 25 MG 24 hr tablet; Take 1 tablet (25 mg total) by mouth every evening.  Dispense: 90 tablet; Refill: 3    10. Stage 3a chronic kidney disease  -     losartan (COZAAR) 100 MG tablet; Take 1 tablet (100 mg total) by mouth once daily.  Dispense: 90 tablet; Refill: 3    11. Benign prostatic hyperplasia with weak urinary stream  Overview:  Patient states has urge incontinence    Orders:  -     tamsulosin (FLOMAX) 0.4 mg Cap; Take 1 capsule (0.4 mg total) by mouth once daily.  Dispense: 90 capsule; Refill: 3    No other significant complaints or concerns were reported.    Today's visit involved the intricate management of episodic problem(s) and the ongoing care for the patient's serious or complex condition(s) listed above, reflecting the inherent complexity of providing longitudinal, comprehensive evaluation and management as the central hub for the patient's primary care services.  Vitals:    05/13/24 0746   BP: 124/60   BP Location: Right arm   Patient Position: Sitting   BP Method: Small (Manual)   Pulse: 71   Resp: 19   Temp: 98 °F (36.7 °C)   SpO2: 98%   Weight: 76 kg (167 lb 8.8 oz)   Height: 5' 10" (1.778 m)   Physical Exam  Vitals reviewed. " "  Constitutional:       General: He is not in acute distress.     Appearance: Normal appearance. He is not ill-appearing or diaphoretic.   Cardiovascular:      Rate and Rhythm: Normal rate and regular rhythm.   Pulmonary:      Effort: Pulmonary effort is normal.      Breath sounds: Normal breath sounds.   Musculoskeletal:         General: Deformity (left clavicle) and signs of injury present. No tenderness.      Left lower leg: Left lower leg edema: left clavicle.   Skin:     General: Skin is warm and dry.   Neurological:      Mental Status: He is alert and oriented to person, place, and time. Mental status is at baseline.   Psychiatric:         Mood and Affect: Mood normal.         Behavior: Behavior normal.         Judgment: Judgment normal.       This note was generated with the assistance of ambient listening technology. Verbal consent was obtained by the patient and accompanying visitor(s) for the recording of patient appointment to facilitate this note. I attest to having reviewed and edited the generated note for accuracy, though some syntax or spelling errors may persist. Please contact the author of this note for any clarification.    Documentation entered by me for this encounter may have been done in part using speech-recognition technology. Although I have made an effort to ensure accuracy, "sound like" errors may exist and should be interpreted in context.   "

## 2024-06-06 DIAGNOSIS — F51.01 PRIMARY INSOMNIA: Primary | ICD-10-CM

## 2024-06-07 RX ORDER — ZOLPIDEM TARTRATE 12.5 MG/1
TABLET, FILM COATED, EXTENDED RELEASE ORAL
Qty: 30 TABLET | Refills: 0 | Status: SHIPPED | OUTPATIENT
Start: 2024-06-07 | End: 2024-08-06

## 2024-07-07 DIAGNOSIS — N52.1 ERECTILE DISORDER DUE TO MEDICAL CONDITION IN MALE PATIENT: ICD-10-CM

## 2024-07-08 RX ORDER — TADALAFIL 5 MG/1
TABLET ORAL
Qty: 20 TABLET | Refills: 2 | Status: SHIPPED | OUTPATIENT
Start: 2024-07-08

## 2024-07-24 ENCOUNTER — OFFICE VISIT (OUTPATIENT)
Age: 64
End: 2024-07-24
Payer: COMMERCIAL

## 2024-07-24 VITALS
WEIGHT: 211.8 LBS | HEIGHT: 70 IN | DIASTOLIC BLOOD PRESSURE: 84 MMHG | HEART RATE: 77 BPM | SYSTOLIC BLOOD PRESSURE: 150 MMHG | TEMPERATURE: 98 F | BODY MASS INDEX: 30.32 KG/M2 | OXYGEN SATURATION: 97 % | RESPIRATION RATE: 16 BRPM

## 2024-07-24 DIAGNOSIS — R73.03 PREDIABETES: Primary | ICD-10-CM

## 2024-07-24 DIAGNOSIS — I10 ESSENTIAL (PRIMARY) HYPERTENSION: ICD-10-CM

## 2024-07-24 DIAGNOSIS — E78.2 MIXED HYPERLIPIDEMIA: ICD-10-CM

## 2024-07-24 DIAGNOSIS — F51.01 PRIMARY INSOMNIA: ICD-10-CM

## 2024-07-24 PROCEDURE — 3079F DIAST BP 80-89 MM HG: CPT | Performed by: INTERNAL MEDICINE

## 2024-07-24 PROCEDURE — 99214 OFFICE O/P EST MOD 30 MIN: CPT | Performed by: INTERNAL MEDICINE

## 2024-07-24 PROCEDURE — 3077F SYST BP >= 140 MM HG: CPT | Performed by: INTERNAL MEDICINE

## 2024-07-24 RX ORDER — ATORVASTATIN CALCIUM 20 MG/1
20 TABLET, FILM COATED ORAL DAILY
Qty: 90 TABLET | Refills: 1 | Status: SHIPPED | OUTPATIENT
Start: 2024-07-24

## 2024-07-24 RX ORDER — VALSARTAN AND HYDROCHLOROTHIAZIDE 160; 12.5 MG/1; MG/1
1 TABLET, FILM COATED ORAL DAILY
Qty: 90 TABLET | Refills: 1 | Status: SHIPPED | OUTPATIENT
Start: 2024-07-24

## 2024-07-24 RX ORDER — ZOLPIDEM TARTRATE 12.5 MG/1
12.5 TABLET, FILM COATED, EXTENDED RELEASE ORAL NIGHTLY PRN
Qty: 30 TABLET | Refills: 0 | Status: SHIPPED | OUTPATIENT
Start: 2024-08-01 | End: 2024-09-30

## 2024-07-24 ASSESSMENT — ENCOUNTER SYMPTOMS
GASTROINTESTINAL NEGATIVE: 1
EYES NEGATIVE: 1
RESPIRATORY NEGATIVE: 1

## 2024-07-24 NOTE — PROGRESS NOTES
Chief Complaint   Patient presents with    Cholesterol Problem    Insomnia    Gastroesophageal Reflux    Hypertension           History of Present Illness  The patient presents for evaluation of multiple medical concerns.    The patient reports intermittent pruritus in his ear. He attempted to alleviate the condition by using a device to monitor wax accumulation, however, he discovered a large hair in his ear.    The patient's knee condition ceased causing him discomfort approximately 3 months ago, resulting in a reduction in swelling. He underwent knee surgery 2 years ago.    The patient has experienced a weight loss of 8 to 10 pounds.    The patient took his blood pressure medication today.    Past Medical History:   Diagnosis Date    CAD (coronary artery disease) 2016    mild; calcium scoring score=46    GERD (gastroesophageal reflux disease)     Hypertension     Mixed hyperlipidemia     LDL goal <70    Prediabetes 09/2016    Rectal polyp 11/10/14    Rotator cuff tear, left     Dr. Barroso    Sleep disturbance      Review of Systems   Constitutional: Negative.    HENT: Negative.     Eyes: Negative.    Respiratory: Negative.     Cardiovascular: Negative.    Gastrointestinal: Negative.    Endocrine: Negative.    Genitourinary: Negative.    Musculoskeletal: Negative.    Neurological: Negative.    Hematological: Negative.    Psychiatric/Behavioral:  Positive for sleep disturbance.        Vitals:    07/24/24 1348   BP: (!) 150/84   Pulse: 77   Resp: 16   Temp: 98 °F (36.7 °C)   SpO2: 97%     Physical Exam  Vital Signs  Vitals show a blood pressure of 150/84.          Reji was seen today for cholesterol problem, insomnia, gastroesophageal reflux and hypertension.    Diagnoses and all orders for this visit:    Prediabetes  -     Comprehensive Metabolic Panel  -     Hemoglobin A1C    Essential (primary) hypertension  -     valsartan-hydroCHLOROthiazide (DIOVAN-HCT) 160-12.5 MG per tablet; Take 1 tablet by mouth

## 2024-07-25 LAB
ALBUMIN SERPL-MCNC: 4.6 G/DL (ref 3.9–4.9)
ALP SERPL-CCNC: 62 IU/L (ref 44–121)
ALT SERPL-CCNC: 42 IU/L (ref 0–44)
AST SERPL-CCNC: 32 IU/L (ref 0–40)
BILIRUB SERPL-MCNC: 0.4 MG/DL (ref 0–1.2)
BUN SERPL-MCNC: 20 MG/DL (ref 8–27)
BUN/CREAT SERPL: 23 (ref 10–24)
CALCIUM SERPL-MCNC: 9.5 MG/DL (ref 8.6–10.2)
CHLORIDE SERPL-SCNC: 103 MMOL/L (ref 96–106)
CO2 SERPL-SCNC: 26 MMOL/L (ref 20–29)
CREAT SERPL-MCNC: 0.88 MG/DL (ref 0.76–1.27)
EGFRCR SERPLBLD CKD-EPI 2021: 98 ML/MIN/1.73
GLOBULIN SER CALC-MCNC: 2.2 G/DL (ref 1.5–4.5)
GLUCOSE SERPL-MCNC: 112 MG/DL (ref 70–99)
HBA1C MFR BLD: 5.7 % (ref 4.8–5.6)
POTASSIUM SERPL-SCNC: 4 MMOL/L (ref 3.5–5.2)
PROT SERPL-MCNC: 6.8 G/DL (ref 6–8.5)
SODIUM SERPL-SCNC: 141 MMOL/L (ref 134–144)

## 2024-08-05 DIAGNOSIS — F51.01 PRIMARY INSOMNIA: ICD-10-CM

## 2024-08-06 RX ORDER — ZOLPIDEM TARTRATE 12.5 MG/1
12.5 TABLET, FILM COATED, EXTENDED RELEASE ORAL NIGHTLY PRN
Qty: 30 TABLET | Refills: 0 | OUTPATIENT
Start: 2024-08-06 | End: 2024-10-05

## 2024-10-21 DIAGNOSIS — F51.01 PRIMARY INSOMNIA: ICD-10-CM

## 2024-10-22 RX ORDER — ZOLPIDEM TARTRATE 12.5 MG/1
TABLET, FILM COATED, EXTENDED RELEASE ORAL
Qty: 30 TABLET | Refills: 0 | Status: SHIPPED | OUTPATIENT
Start: 2024-10-22 | End: 2025-01-20

## 2024-10-22 NOTE — TELEPHONE ENCOUNTER
Last appt 7/24/2024      Next Apt:     Future Appointments   Date Time Provider Department Center   1/22/2025  3:00 PM Indiana Carpenter MD [facility] BS ECC DEP         [address] - P [phone] - F [phone] [address] [facility] [place] [address]  Phone: [phone] Fax: [phone]

## 2024-11-30 DIAGNOSIS — K21.9 GASTROESOPHAGEAL REFLUX DISEASE WITHOUT ESOPHAGITIS: ICD-10-CM

## 2024-12-02 NOTE — TELEPHONE ENCOUNTER
Last appt 7/24/2024      Next Apt:     Future Appointments   Date Time Provider Department Center   1/22/2025  3:00 PM Indiana Carpenter MD [facility] BS ECC DEP         [address] - P [phone] - F [phone] [place] [facility] [place] [address]  Phone: [phone] Fax: [phone]

## 2024-12-27 DIAGNOSIS — F51.01 PRIMARY INSOMNIA: ICD-10-CM

## 2024-12-27 DIAGNOSIS — N52.1 ERECTILE DISORDER DUE TO MEDICAL CONDITION IN MALE PATIENT: ICD-10-CM

## 2024-12-27 RX ORDER — ZOLPIDEM TARTRATE 12.5 MG/1
12.5 TABLET, FILM COATED, EXTENDED RELEASE ORAL NIGHTLY PRN
Qty: 30 TABLET | Refills: 0 | Status: SHIPPED | OUTPATIENT
Start: 2024-12-27 | End: 2025-03-27

## 2024-12-27 RX ORDER — TADALAFIL 5 MG/1
TABLET ORAL
Qty: 20 TABLET | Refills: 2 | Status: SHIPPED | OUTPATIENT
Start: 2024-12-27

## 2024-12-27 NOTE — TELEPHONE ENCOUNTER
Last appt 7/24/2024      Next Apt:     Future Appointments   Date Time Provider Department Center   1/22/2025  3:00 PM Indiana Carpenter MD [facility] BS ECC DEP         [address] - P [phone] - F [phone] [place] [facility] [place] [address]  Phone: [phone] Fax: [phone]

## 2025-02-04 ENCOUNTER — OFFICE VISIT (OUTPATIENT)
Age: 65
End: 2025-02-04
Payer: COMMERCIAL

## 2025-02-04 VITALS
HEIGHT: 70 IN | WEIGHT: 214 LBS | BODY MASS INDEX: 30.64 KG/M2 | DIASTOLIC BLOOD PRESSURE: 80 MMHG | TEMPERATURE: 97.8 F | HEART RATE: 82 BPM | SYSTOLIC BLOOD PRESSURE: 138 MMHG | OXYGEN SATURATION: 98 % | RESPIRATION RATE: 16 BRPM

## 2025-02-04 DIAGNOSIS — E78.2 MIXED HYPERLIPIDEMIA: ICD-10-CM

## 2025-02-04 DIAGNOSIS — F33.8 SEASONAL AFFECTIVE DISORDER (HCC): ICD-10-CM

## 2025-02-04 DIAGNOSIS — R73.03 PREDIABETES: ICD-10-CM

## 2025-02-04 DIAGNOSIS — I10 ESSENTIAL (PRIMARY) HYPERTENSION: Primary | ICD-10-CM

## 2025-02-04 DIAGNOSIS — F51.01 PRIMARY INSOMNIA: ICD-10-CM

## 2025-02-04 PROCEDURE — 3075F SYST BP GE 130 - 139MM HG: CPT | Performed by: INTERNAL MEDICINE

## 2025-02-04 PROCEDURE — 3079F DIAST BP 80-89 MM HG: CPT | Performed by: INTERNAL MEDICINE

## 2025-02-04 PROCEDURE — 99214 OFFICE O/P EST MOD 30 MIN: CPT | Performed by: INTERNAL MEDICINE

## 2025-02-04 RX ORDER — ZOLPIDEM TARTRATE 12.5 MG/1
12.5 TABLET, FILM COATED, EXTENDED RELEASE ORAL NIGHTLY PRN
Qty: 30 TABLET | Refills: 0 | Status: SHIPPED | OUTPATIENT
Start: 2025-02-04 | End: 2025-05-05

## 2025-02-04 RX ORDER — HYDROXYZINE HYDROCHLORIDE 25 MG/1
25 TABLET, FILM COATED ORAL NIGHTLY PRN
Qty: 30 TABLET | Refills: 3 | Status: SHIPPED | OUTPATIENT
Start: 2025-02-04 | End: 2025-05-05

## 2025-02-04 RX ORDER — VALSARTAN AND HYDROCHLOROTHIAZIDE 160; 12.5 MG/1; MG/1
1 TABLET, FILM COATED ORAL DAILY
Qty: 90 TABLET | Refills: 1 | Status: SHIPPED | OUTPATIENT
Start: 2025-02-04

## 2025-02-04 RX ORDER — ATORVASTATIN CALCIUM 20 MG/1
20 TABLET, FILM COATED ORAL DAILY
Qty: 90 TABLET | Refills: 1 | Status: SHIPPED | OUTPATIENT
Start: 2025-02-04

## 2025-02-04 SDOH — ECONOMIC STABILITY: FOOD INSECURITY: WITHIN THE PAST 12 MONTHS, THE FOOD YOU BOUGHT JUST DIDN'T LAST AND YOU DIDN'T HAVE MONEY TO GET MORE.: NEVER TRUE

## 2025-02-04 SDOH — ECONOMIC STABILITY: FOOD INSECURITY: WITHIN THE PAST 12 MONTHS, YOU WORRIED THAT YOUR FOOD WOULD RUN OUT BEFORE YOU GOT MONEY TO BUY MORE.: NEVER TRUE

## 2025-02-04 ASSESSMENT — PATIENT HEALTH QUESTIONNAIRE - PHQ9
1. LITTLE INTEREST OR PLEASURE IN DOING THINGS: NOT AT ALL
2. FEELING DOWN, DEPRESSED OR HOPELESS: NOT AT ALL
SUM OF ALL RESPONSES TO PHQ QUESTIONS 1-9: 0
SUM OF ALL RESPONSES TO PHQ9 QUESTIONS 1 & 2: 0
SUM OF ALL RESPONSES TO PHQ QUESTIONS 1-9: 0

## 2025-02-04 ASSESSMENT — ENCOUNTER SYMPTOMS
GASTROINTESTINAL NEGATIVE: 1
RESPIRATORY NEGATIVE: 1
EYES NEGATIVE: 1

## 2025-02-04 NOTE — PROGRESS NOTES
Chief Complaint   Patient presents with    Hypertension    Cholesterol Problem    Gastroesophageal Reflux    Impaired fasting glucose    Insomnia     \"Have you been to the ER, urgent care clinic since your last visit?  Hospitalized since your last visit?\"    NO    “Have you seen or consulted any other health care providers outside our system since your last visit?”    NO      “Have you had a colorectal cancer screening such as a colonoscopy/FIT/Cologuard?    NO    Date of last Colonoscopy: 10/19/2021  No cologuard on file  No FIT/FOBT on file   No flexible sigmoidoscopy on file

## 2025-02-05 LAB
ALBUMIN SERPL-MCNC: 4.5 G/DL (ref 3.9–4.9)
ALP SERPL-CCNC: 66 IU/L (ref 44–121)
ALT SERPL-CCNC: 42 IU/L (ref 0–44)
AST SERPL-CCNC: 31 IU/L (ref 0–40)
BILIRUB SERPL-MCNC: 0.6 MG/DL (ref 0–1.2)
BUN SERPL-MCNC: 17 MG/DL (ref 8–27)
BUN/CREAT SERPL: 17 (ref 10–24)
CALCIUM SERPL-MCNC: 9.4 MG/DL (ref 8.6–10.2)
CHLORIDE SERPL-SCNC: 101 MMOL/L (ref 96–106)
CO2 SERPL-SCNC: 25 MMOL/L (ref 20–29)
CREAT SERPL-MCNC: 0.98 MG/DL (ref 0.76–1.27)
EGFRCR SERPLBLD CKD-EPI 2021: 87 ML/MIN/1.73
GLOBULIN SER CALC-MCNC: 2.4 G/DL (ref 1.5–4.5)
GLUCOSE SERPL-MCNC: 158 MG/DL (ref 70–99)
HBA1C MFR BLD: 5.9 % (ref 4.8–5.6)
POTASSIUM SERPL-SCNC: 4.5 MMOL/L (ref 3.5–5.2)
PROT SERPL-MCNC: 6.9 G/DL (ref 6–8.5)
SODIUM SERPL-SCNC: 141 MMOL/L (ref 134–144)

## 2025-02-10 DIAGNOSIS — K21.9 GASTROESOPHAGEAL REFLUX DISEASE WITHOUT ESOPHAGITIS: ICD-10-CM

## 2025-02-10 NOTE — TELEPHONE ENCOUNTER
Last appt 2/4/2025      Next Apt:     Future Appointments   Date Time Provider Department Center   5/5/2025 10:30 AM Indiana Carpenter MD [facility] BS ECC [address] - P [phone] - F [phone] [address] [facility] [place] [address]  Phone: [phone] Fax: [phone]

## 2025-05-05 ENCOUNTER — OFFICE VISIT (OUTPATIENT)
Age: 65
End: 2025-05-05
Payer: COMMERCIAL

## 2025-05-05 VITALS
OXYGEN SATURATION: 98 % | SYSTOLIC BLOOD PRESSURE: 160 MMHG | DIASTOLIC BLOOD PRESSURE: 80 MMHG | HEART RATE: 90 BPM | HEIGHT: 70 IN | TEMPERATURE: 97.7 F | BODY MASS INDEX: 30.64 KG/M2 | WEIGHT: 214 LBS | RESPIRATION RATE: 16 BRPM

## 2025-05-05 DIAGNOSIS — K21.9 GASTROESOPHAGEAL REFLUX DISEASE WITHOUT ESOPHAGITIS: ICD-10-CM

## 2025-05-05 DIAGNOSIS — F51.01 PRIMARY INSOMNIA: ICD-10-CM

## 2025-05-05 DIAGNOSIS — R73.03 PREDIABETES: ICD-10-CM

## 2025-05-05 DIAGNOSIS — I10 ESSENTIAL HYPERTENSION, BENIGN: Primary | ICD-10-CM

## 2025-05-05 DIAGNOSIS — Z29.89 NEED FOR MALARIA PROPHYLAXIS: ICD-10-CM

## 2025-05-05 DIAGNOSIS — E78.2 MIXED HYPERLIPIDEMIA: ICD-10-CM

## 2025-05-05 PROCEDURE — 99214 OFFICE O/P EST MOD 30 MIN: CPT | Performed by: INTERNAL MEDICINE

## 2025-05-05 PROCEDURE — 3077F SYST BP >= 140 MM HG: CPT | Performed by: INTERNAL MEDICINE

## 2025-05-05 PROCEDURE — 3079F DIAST BP 80-89 MM HG: CPT | Performed by: INTERNAL MEDICINE

## 2025-05-05 RX ORDER — ZOLPIDEM TARTRATE 12.5 MG/1
12.5 TABLET, FILM COATED, EXTENDED RELEASE ORAL NIGHTLY PRN
Qty: 30 TABLET | Refills: 0 | Status: SHIPPED | OUTPATIENT
Start: 2025-05-08 | End: 2025-08-06

## 2025-05-05 RX ORDER — ATORVASTATIN CALCIUM 20 MG/1
20 TABLET, FILM COATED ORAL DAILY
Qty: 90 TABLET | Refills: 1 | Status: SHIPPED | OUTPATIENT
Start: 2025-05-05

## 2025-05-05 RX ORDER — VALSARTAN AND HYDROCHLOROTHIAZIDE 160; 25 MG/1; MG/1
1 TABLET ORAL DAILY
Qty: 90 TABLET | Refills: 1 | Status: SHIPPED | OUTPATIENT
Start: 2025-05-05

## 2025-05-05 RX ORDER — ATOVAQUONE AND PROGUANIL HYDROCHLORIDE 250; 100 MG/1; MG/1
1 TABLET, FILM COATED ORAL DAILY
Qty: 30 TABLET | Refills: 0 | Status: SHIPPED | OUTPATIENT
Start: 2025-05-05

## 2025-05-05 RX ORDER — LATANOPROST 50 UG/ML
SOLUTION/ DROPS OPHTHALMIC
COMMUNITY
Start: 2025-04-11

## 2025-05-05 ASSESSMENT — ENCOUNTER SYMPTOMS
GASTROINTESTINAL NEGATIVE: 1
RESPIRATORY NEGATIVE: 1
EYES NEGATIVE: 1

## 2025-05-05 NOTE — PROGRESS NOTES
Chief Complaint   Patient presents with    Hypertension    Gastroesophageal Reflux    Cholesterol Problem    Follow-up Chronic Condition    Insomnia           History of Present Illness  The patient presents for  hypertension,  malaria prophylaxis, and medication management.      Hypertension  - Monitors blood pressure at home, typically reads around 142/80  - Currently on valsartan/hydrochlorothiazide 160/12.5 mg    Blood pressure is elevated today.  He is compliant with medication.    Malaria prophylaxis  - Planning a trip to [facility] on Saturday  - Seeking malaria prophylaxis    Medication management  - Requesting refills of atorvastatin prescription  - Occasionally takes Ambien for sleep, has 4 tablets remaining  - Recently refilled omeprazole prescription  - Uses naproxen as needed    Supplemental information: None    Past Medical History:   Diagnosis Date    CAD (coronary artery disease) 2016    mild; calcium scoring score=46    GERD (gastroesophageal reflux disease)     Hypertension     Mixed hyperlipidemia     LDL goal <70    Prediabetes 09/2016    Rectal polyp 11/10/14    Rotator cuff tear, left     Dr. Barroso    Sleep disturbance      Review of Systems   Constitutional: Negative.    HENT: Negative.     Eyes: Negative.    Respiratory: Negative.     Cardiovascular: Negative.    Gastrointestinal: Negative.    Endocrine: Negative.    Genitourinary: Negative.    Musculoskeletal: Negative.    Skin: Negative.    Neurological: Negative.    Hematological: Negative.    Psychiatric/Behavioral:  Positive for sleep disturbance.        Vitals:    05/05/25 1030   BP: (!) 160/80   Pulse: 90   Resp: 16   Temp: 97.7 °F (36.5 °C)   SpO2: 98%     Physical Exam    Physical Exam  Vitals and nursing note reviewed.   Constitutional:       General: She is not in acute distress.     Appearance: Normal appearance.well-developed,not diaphoretic.   HENT:       Neck: Supple, no JVP or carotid bruit. No cervical adenopathy.

## 2025-05-05 NOTE — PROGRESS NOTES
Chief Complaint   Patient presents with    Hypertension    Gastroesophageal Reflux    Cholesterol Problem    Follow-up Chronic Condition    Insomnia     Have you been to the ER, urgent care clinic since your last visit?  Hospitalized since your last visit?   NO    Have you seen or consulted any other health care providers outside our system since your last visit?   NO      “Have you had a colorectal cancer screening such as a colonoscopy/FIT/Cologuard?    NO    Date of last Colonoscopy: 10/19/2021  No cologuard on file  No FIT/FOBT on file   No flexible sigmoidoscopy on file

## 2025-05-18 DIAGNOSIS — Z91.030 BEE STING ALLERGY: ICD-10-CM

## 2025-05-19 DIAGNOSIS — N52.1 ERECTILE DISORDER DUE TO MEDICAL CONDITION IN MALE PATIENT: ICD-10-CM

## 2025-05-19 RX ORDER — EPINEPHRINE 0.3 MG/.3ML
0.3 INJECTION SUBCUTANEOUS ONCE
Qty: 0.3 ML | Refills: 0 | Status: SHIPPED | OUTPATIENT
Start: 2025-05-19 | End: 2025-05-19

## 2025-05-19 RX ORDER — TADALAFIL 5 MG/1
TABLET ORAL
Qty: 20 TABLET | Refills: 2 | Status: SHIPPED | OUTPATIENT
Start: 2025-05-19

## 2025-05-28 DIAGNOSIS — E78.2 MIXED HYPERLIPIDEMIA: ICD-10-CM

## 2025-06-07 LAB
CHOLEST SERPL-MCNC: 187 MG/DL (ref 100–199)
HDLC SERPL-MCNC: 42 MG/DL
IMP & REVIEW OF LAB RESULTS: NORMAL
LDLC SERPL CALC-MCNC: 95 MG/DL (ref 0–99)
TRIGL SERPL-MCNC: 298 MG/DL (ref 0–149)
VLDLC SERPL CALC-MCNC: 50 MG/DL (ref 5–40)

## 2025-06-09 ENCOUNTER — RESULTS FOLLOW-UP (OUTPATIENT)
Age: 65
End: 2025-06-09

## 2025-06-09 DIAGNOSIS — E78.2 MIXED HYPERLIPIDEMIA: Primary | ICD-10-CM

## 2025-06-09 RX ORDER — FENOFIBRATE 48 MG/1
48 TABLET, FILM COATED ORAL DAILY
Qty: 90 TABLET | Refills: 1 | Status: SHIPPED | OUTPATIENT
Start: 2025-06-09

## 2025-06-09 NOTE — TELEPHONE ENCOUNTER
Last appt 5/5/2025      Next Apt:     Future Appointments   Date Time Provider Department Center   6/16/2025 10:40 AM Johana Berger APRN - CNP [facility] ECC DEP   7/9/2025 10:15 AM Indiana Carpenter MD [facility] DEP         [address] - P [phone] - F [phone]  [address] [facility] [place] [address]  Phone: [phone] Fax: [phone]

## 2025-06-12 DIAGNOSIS — I10 ESSENTIAL HYPERTENSION, BENIGN: Primary | ICD-10-CM

## 2025-06-12 RX ORDER — VALSARTAN AND HYDROCHLOROTHIAZIDE 160; 12.5 MG/1; MG/1
1 TABLET, FILM COATED ORAL DAILY
Qty: 90 TABLET | Refills: 1 | Status: SHIPPED | OUTPATIENT
Start: 2025-06-12

## 2025-06-12 NOTE — TELEPHONE ENCOUNTER
Indiana Carpenter MD to Me      6/12/25  8:43 AM  Pls call in old dosevalsartan-hctz 160/12.5 mg a day    Last appt 5/5/2025      Next Apt:     Future Appointments   Date Time Provider Department Center   7/9/2025 10:15 AM Indiana Carpenter MD [address] [facility] - P [phone] - F [phone]  [address] [facility] [place] [address]  Phone: [phone] Fax: [phone]

## 2025-07-09 ENCOUNTER — OFFICE VISIT (OUTPATIENT)
Age: 65
End: 2025-07-09
Payer: COMMERCIAL

## 2025-07-09 VITALS
RESPIRATION RATE: 16 BRPM | BODY MASS INDEX: 31.07 KG/M2 | DIASTOLIC BLOOD PRESSURE: 82 MMHG | OXYGEN SATURATION: 98 % | HEART RATE: 88 BPM | TEMPERATURE: 98 F | WEIGHT: 217 LBS | HEIGHT: 70 IN | SYSTOLIC BLOOD PRESSURE: 130 MMHG

## 2025-07-09 DIAGNOSIS — F51.01 PRIMARY INSOMNIA: ICD-10-CM

## 2025-07-09 DIAGNOSIS — Z12.5 SCREENING FOR PROSTATE CANCER: ICD-10-CM

## 2025-07-09 DIAGNOSIS — I10 ESSENTIAL HYPERTENSION, BENIGN: ICD-10-CM

## 2025-07-09 DIAGNOSIS — E78.2 MIXED HYPERLIPIDEMIA: ICD-10-CM

## 2025-07-09 DIAGNOSIS — N52.1 ERECTILE DISORDER DUE TO MEDICAL CONDITION IN MALE PATIENT: ICD-10-CM

## 2025-07-09 DIAGNOSIS — K21.9 GASTROESOPHAGEAL REFLUX DISEASE WITHOUT ESOPHAGITIS: ICD-10-CM

## 2025-07-09 DIAGNOSIS — Z00.00 ROUTINE GENERAL MEDICAL EXAMINATION AT A HEALTH CARE FACILITY: Primary | ICD-10-CM

## 2025-07-09 PROCEDURE — 3075F SYST BP GE 130 - 139MM HG: CPT | Performed by: INTERNAL MEDICINE

## 2025-07-09 PROCEDURE — 3079F DIAST BP 80-89 MM HG: CPT | Performed by: INTERNAL MEDICINE

## 2025-07-09 PROCEDURE — 99396 PREV VISIT EST AGE 40-64: CPT | Performed by: INTERNAL MEDICINE

## 2025-07-09 RX ORDER — ATORVASTATIN CALCIUM 20 MG/1
20 TABLET, FILM COATED ORAL DAILY
Qty: 90 TABLET | Refills: 1 | Status: SHIPPED | OUTPATIENT
Start: 2025-07-09

## 2025-07-09 RX ORDER — OMEPRAZOLE 20 MG/1
20 CAPSULE, DELAYED RELEASE ORAL DAILY
Qty: 90 CAPSULE | Refills: 0 | Status: SHIPPED | OUTPATIENT
Start: 2025-07-09

## 2025-07-09 RX ORDER — FENOFIBRATE 48 MG/1
48 TABLET, FILM COATED ORAL DAILY
Qty: 90 TABLET | Refills: 1 | Status: SHIPPED | OUTPATIENT
Start: 2025-07-09

## 2025-07-09 RX ORDER — TADALAFIL 5 MG/1
TABLET ORAL
Qty: 20 TABLET | Refills: 2 | Status: SHIPPED | OUTPATIENT
Start: 2025-07-09

## 2025-07-09 RX ORDER — ZOLPIDEM TARTRATE 12.5 MG/1
12.5 TABLET, FILM COATED, EXTENDED RELEASE ORAL NIGHTLY PRN
Qty: 30 TABLET | Refills: 0 | Status: SHIPPED | OUTPATIENT
Start: 2025-07-09 | End: 2025-10-07

## 2025-07-09 RX ORDER — VALSARTAN AND HYDROCHLOROTHIAZIDE 160; 12.5 MG/1; MG/1
1 TABLET, FILM COATED ORAL DAILY
Qty: 90 TABLET | Refills: 1 | Status: SHIPPED | OUTPATIENT
Start: 2025-07-09

## 2025-07-09 ASSESSMENT — ENCOUNTER SYMPTOMS
EYES NEGATIVE: 1
RESPIRATORY NEGATIVE: 1
GASTROINTESTINAL NEGATIVE: 1

## 2025-07-09 ASSESSMENT — PATIENT HEALTH QUESTIONNAIRE - PHQ9
SUM OF ALL RESPONSES TO PHQ QUESTIONS 1-9: 0
5. POOR APPETITE OR OVEREATING: NOT AT ALL
10. IF YOU CHECKED OFF ANY PROBLEMS, HOW DIFFICULT HAVE THESE PROBLEMS MADE IT FOR YOU TO DO YOUR WORK, TAKE CARE OF THINGS AT HOME, OR GET ALONG WITH OTHER PEOPLE: NOT DIFFICULT AT ALL
9. THOUGHTS THAT YOU WOULD BE BETTER OFF DEAD, OR OF HURTING YOURSELF: NOT AT ALL
7. TROUBLE CONCENTRATING ON THINGS, SUCH AS READING THE NEWSPAPER OR WATCHING TELEVISION: NOT AT ALL
1. LITTLE INTEREST OR PLEASURE IN DOING THINGS: NOT AT ALL
2. FEELING DOWN, DEPRESSED OR HOPELESS: NOT AT ALL
SUM OF ALL RESPONSES TO PHQ QUESTIONS 1-9: 0
3. TROUBLE FALLING OR STAYING ASLEEP: NOT AT ALL
SUM OF ALL RESPONSES TO PHQ QUESTIONS 1-9: 0
8. MOVING OR SPEAKING SO SLOWLY THAT OTHER PEOPLE COULD HAVE NOTICED. OR THE OPPOSITE, BEING SO FIGETY OR RESTLESS THAT YOU HAVE BEEN MOVING AROUND A LOT MORE THAN USUAL: NOT AT ALL
6. FEELING BAD ABOUT YOURSELF - OR THAT YOU ARE A FAILURE OR HAVE LET YOURSELF OR YOUR FAMILY DOWN: NOT AT ALL
SUM OF ALL RESPONSES TO PHQ QUESTIONS 1-9: 0
4. FEELING TIRED OR HAVING LITTLE ENERGY: NOT AT ALL

## 2025-07-09 NOTE — PROGRESS NOTES
Chief Complaint   Patient presents with    Annual Exam    Hypertension    Gastroesophageal Reflux    Insomnia    Cholesterol Problem    Prediabetes           History of Present Illness  The patient presents for a physical exam.     He reports overall good health, with the exception of a slight weight gain, which he attributes to his consumption of beer. He plans to reduce his intake. He is currently on one medication for blood pressure management and has discontinued the use of Malarone. He occasionally uses a sleep aid, which he finds beneficial. He experiences stress due to his work, which he does not find enjoyable.     He has been diagnosed with sleep apnea but does not use a CPAP machine as it was ineffective for him. He has been advised to lose weight. He is up to date with his colonoscopy screenings, with the most recent one conducted 7 years ago. He is on atorvastatin and fenofibrate 48 mg for cholesterol management. He is requesting refills for Cialis and omeprazole, which he takes as needed.  He has insomnia, taking Ambien sometimes.  He believes once he retired his stress level would be less and he would not take much Ambien.  Needed refill.  SOCIAL HISTORY  - Admits to drinking a couple of beers    Past Medical History:   Diagnosis Date    CAD (coronary artery disease) 2016    mild; calcium scoring score=46    GERD (gastroesophageal reflux disease)     Hypertension     Mixed hyperlipidemia     LDL goal <70    Prediabetes 09/2016    Rectal polyp 11/10/14    Rotator cuff tear, left     Dr. Barroso    Sleep disturbance      Review of Systems   Constitutional: Negative.    HENT: Negative.     Eyes: Negative.    Respiratory: Negative.     Cardiovascular: Negative.    Gastrointestinal: Negative.    Endocrine: Negative.    Genitourinary: Negative.    Musculoskeletal: Negative.    Skin: Negative.    Neurological: Negative.    Hematological: Negative.    Psychiatric/Behavioral: Negative.         Vitals:

## 2025-07-09 NOTE — PROGRESS NOTES
Chief Complaint   Patient presents with    Annual Exam    Hypertension    Gastroesophageal Reflux    Insomnia    Cholesterol Problem    Prediabetes     Have you been to the ER, urgent care clinic since your last visit?  Hospitalized since your last visit?   NO    Have you seen or consulted any other health care providers outside our system since your last visit?   NO    “Have you had a colorectal cancer screening such as a colonoscopy/FIT/Cologuard?    NO    Date of last Colonoscopy: 10/19/2021  No cologuard on file  No FIT/FOBT on file   No flexible sigmoidoscopy on file